# Patient Record
Sex: MALE | Race: OTHER | HISPANIC OR LATINO | Employment: OTHER | URBAN - METROPOLITAN AREA
[De-identification: names, ages, dates, MRNs, and addresses within clinical notes are randomized per-mention and may not be internally consistent; named-entity substitution may affect disease eponyms.]

---

## 2017-11-13 ENCOUNTER — LAB (OUTPATIENT)
Dept: LAB | Facility: HOSPITAL | Age: 67
End: 2017-11-13
Attending: OTOLARYNGOLOGY
Payer: COMMERCIAL

## 2017-11-13 ENCOUNTER — TRANSCRIBE ORDERS (OUTPATIENT)
Dept: LAB | Facility: HOSPITAL | Age: 67
End: 2017-11-13

## 2017-11-13 DIAGNOSIS — J32.9 CHRONIC SINUSITIS, UNSPECIFIED LOCATION: Primary | ICD-10-CM

## 2017-11-13 DIAGNOSIS — J32.9 CHRONIC SINUSITIS, UNSPECIFIED LOCATION: ICD-10-CM

## 2017-11-13 LAB
ANION GAP SERPL CALCULATED.3IONS-SCNC: 8 MMOL/L (ref 4–13)
ATRIAL RATE: 81 BPM
BASOPHILS # BLD AUTO: 0.05 THOUSANDS/ΜL (ref 0–0.1)
BASOPHILS NFR BLD AUTO: 1 % (ref 0–1)
BUN SERPL-MCNC: 14 MG/DL (ref 5–25)
CALCIUM SERPL-MCNC: 8.4 MG/DL (ref 8.3–10.1)
CHLORIDE SERPL-SCNC: 108 MMOL/L (ref 100–108)
CO2 SERPL-SCNC: 28 MMOL/L (ref 21–32)
CREAT SERPL-MCNC: 0.96 MG/DL (ref 0.6–1.3)
EOSINOPHIL # BLD AUTO: 0.29 THOUSAND/ΜL (ref 0–0.61)
EOSINOPHIL NFR BLD AUTO: 4 % (ref 0–6)
ERYTHROCYTE [DISTWIDTH] IN BLOOD BY AUTOMATED COUNT: 15.5 % (ref 11.6–15.1)
GFR SERPL CREATININE-BSD FRML MDRD: 80 ML/MIN/1.73SQ M
GLUCOSE P FAST SERPL-MCNC: 93 MG/DL (ref 65–99)
HCT VFR BLD AUTO: 44 % (ref 36.5–49.3)
HGB BLD-MCNC: 14.4 G/DL (ref 12–17)
LYMPHOCYTES # BLD AUTO: 2.02 THOUSANDS/ΜL (ref 0.6–4.47)
LYMPHOCYTES NFR BLD AUTO: 26 % (ref 14–44)
MCH RBC QN AUTO: 29.2 PG (ref 26.8–34.3)
MCHC RBC AUTO-ENTMCNC: 32.7 G/DL (ref 31.4–37.4)
MCV RBC AUTO: 89 FL (ref 82–98)
MONOCYTES # BLD AUTO: 0.97 THOUSAND/ΜL (ref 0.17–1.22)
MONOCYTES NFR BLD AUTO: 13 % (ref 4–12)
NEUTROPHILS # BLD AUTO: 4.4 THOUSANDS/ΜL (ref 1.85–7.62)
NEUTS SEG NFR BLD AUTO: 56 % (ref 43–75)
NRBC BLD AUTO-RTO: 0 /100 WBCS
P AXIS: 54 DEGREES
PLATELET # BLD AUTO: 181 THOUSANDS/UL (ref 149–390)
PMV BLD AUTO: 12.2 FL (ref 8.9–12.7)
POTASSIUM SERPL-SCNC: 4 MMOL/L (ref 3.5–5.3)
PR INTERVAL: 152 MS
QRS AXIS: -39 DEGREES
QRSD INTERVAL: 132 MS
QT INTERVAL: 440 MS
QTC INTERVAL: 511 MS
RBC # BLD AUTO: 4.93 MILLION/UL (ref 3.88–5.62)
SODIUM SERPL-SCNC: 144 MMOL/L (ref 136–145)
T WAVE AXIS: 26 DEGREES
VENTRICULAR RATE: 81 BPM
WBC # BLD AUTO: 7.78 THOUSAND/UL (ref 4.31–10.16)

## 2017-11-13 PROCEDURE — 36415 COLL VENOUS BLD VENIPUNCTURE: CPT

## 2017-11-13 PROCEDURE — 85025 COMPLETE CBC W/AUTO DIFF WBC: CPT

## 2017-11-13 PROCEDURE — 80048 BASIC METABOLIC PNL TOTAL CA: CPT

## 2017-11-13 PROCEDURE — 93005 ELECTROCARDIOGRAM TRACING: CPT

## 2017-11-28 RX ORDER — AMLODIPINE BESYLATE 5 MG/1
5 TABLET ORAL DAILY
COMMUNITY
End: 2020-09-18 | Stop reason: SDUPTHER

## 2017-11-28 RX ORDER — PRAVASTATIN SODIUM 10 MG
10 TABLET ORAL DAILY
COMMUNITY
End: 2020-09-18 | Stop reason: SDUPTHER

## 2017-11-28 RX ORDER — ALBUTEROL SULFATE 90 UG/1
2 AEROSOL, METERED RESPIRATORY (INHALATION) EVERY 6 HOURS PRN
COMMUNITY
End: 2021-01-06 | Stop reason: SDUPTHER

## 2017-11-28 RX ORDER — METRONIDAZOLE 10 MG/G
GEL TOPICAL DAILY
Status: ON HOLD | COMMUNITY
End: 2017-12-11

## 2017-11-28 RX ORDER — LISINOPRIL 40 MG/1
40 TABLET ORAL DAILY
COMMUNITY
End: 2020-11-18 | Stop reason: SDUPTHER

## 2017-12-06 NOTE — PRE-PROCEDURE INSTRUCTIONS
Pre-Surgery Instructions:   Medication Instructions    albuterol (PROVENTIL HFA,VENTOLIN HFA) 90 mcg/act inhaler Instructed patient per Anesthesia Guidelines   amLODIPine (NORVASC) 5 mg tablet Instructed patient per Anesthesia Guidelines   Cetirizine HCl 10 MG CAPS Instructed patient per Anesthesia Guidelines   fluticasone-salmeterol (ADVAIR) 250-50 mcg/dose inhaler Instructed patient per Anesthesia Guidelines   lisinopril (ZESTRIL) 40 mg tablet Instructed patient per Anesthesia Guidelines   metroNIDAZOLE (METROGEL) 1 % gel Instructed patient per Anesthesia Guidelines   pravastatin (PRAVACHOL) 10 mg tablet Instructed patient per Anesthesia Guidelines        PRE OP INSTRUCTIONS REVIEWED WITH [facility] HOSPITAL INSTRUCTIONS REVIEWED AND SHE VERBALIZES UNDERSTANDING OF INSTRUCTIONS GIVEN

## 2017-12-11 ENCOUNTER — ANESTHESIA (OUTPATIENT)
Dept: PERIOP | Facility: HOSPITAL | Age: 67
End: 2017-12-11
Payer: COMMERCIAL

## 2017-12-11 ENCOUNTER — ANESTHESIA EVENT (OUTPATIENT)
Dept: PERIOP | Facility: HOSPITAL | Age: 67
End: 2017-12-11
Payer: COMMERCIAL

## 2017-12-11 ENCOUNTER — HOSPITAL ENCOUNTER (OUTPATIENT)
Facility: HOSPITAL | Age: 67
Setting detail: OUTPATIENT SURGERY
Discharge: HOME | End: 2017-12-11
Attending: OTOLARYNGOLOGY | Admitting: OTOLARYNGOLOGY
Payer: COMMERCIAL

## 2017-12-11 VITALS
TEMPERATURE: 100.1 F | HEART RATE: 86 BPM | SYSTOLIC BLOOD PRESSURE: 139 MMHG | BODY MASS INDEX: 30.16 KG/M2 | RESPIRATION RATE: 18 BRPM | OXYGEN SATURATION: 95 % | DIASTOLIC BLOOD PRESSURE: 78 MMHG | HEIGHT: 65 IN | WEIGHT: 181 LBS

## 2017-12-11 DIAGNOSIS — L71.1 RHINOPHYMA: ICD-10-CM

## 2017-12-11 DIAGNOSIS — J32.9 CHRONIC SINUSITIS: ICD-10-CM

## 2017-12-11 PROCEDURE — 87205 SMEAR GRAM STAIN: CPT | Performed by: OTOLARYNGOLOGY

## 2017-12-11 PROCEDURE — 88312 SPECIAL STAINS GROUP 1: CPT | Performed by: OTOLARYNGOLOGY

## 2017-12-11 PROCEDURE — 87147 CULTURE TYPE IMMUNOLOGIC: CPT | Performed by: OTOLARYNGOLOGY

## 2017-12-11 PROCEDURE — 88311 DECALCIFY TISSUE: CPT | Performed by: OTOLARYNGOLOGY

## 2017-12-11 PROCEDURE — 88305 TISSUE EXAM BY PATHOLOGIST: CPT | Performed by: OTOLARYNGOLOGY

## 2017-12-11 PROCEDURE — C2625 STENT, NON-COR, TEM W/DEL SY: HCPCS | Performed by: OTOLARYNGOLOGY

## 2017-12-11 PROCEDURE — 87075 CULTR BACTERIA EXCEPT BLOOD: CPT | Performed by: OTOLARYNGOLOGY

## 2017-12-11 PROCEDURE — 87070 CULTURE OTHR SPECIMN AEROBIC: CPT | Performed by: OTOLARYNGOLOGY

## 2017-12-11 PROCEDURE — 87176 TISSUE HOMOGENIZATION CULTR: CPT | Performed by: OTOLARYNGOLOGY

## 2017-12-11 PROCEDURE — 88304 TISSUE EXAM BY PATHOLOGIST: CPT | Performed by: OTOLARYNGOLOGY

## 2017-12-11 DEVICE — PROPEL SINUS IMPLANT
Type: IMPLANTABLE DEVICE | Site: NOSE | Status: FUNCTIONAL
Brand: PROPEL

## 2017-12-11 RX ORDER — ONDANSETRON 2 MG/ML
4 INJECTION INTRAMUSCULAR; INTRAVENOUS ONCE AS NEEDED
Status: DISCONTINUED | OUTPATIENT
Start: 2017-12-11 | End: 2017-12-11 | Stop reason: HOSPADM

## 2017-12-11 RX ORDER — LIDOCAINE HYDROCHLORIDE 10 MG/ML
INJECTION, SOLUTION INFILTRATION; PERINEURAL AS NEEDED
Status: DISCONTINUED | OUTPATIENT
Start: 2017-12-11 | End: 2017-12-11 | Stop reason: SURG

## 2017-12-11 RX ORDER — FENTANYL CITRATE/PF 50 MCG/ML
25 SYRINGE (ML) INJECTION
Status: DISCONTINUED | OUTPATIENT
Start: 2017-12-11 | End: 2017-12-11 | Stop reason: HOSPADM

## 2017-12-11 RX ORDER — GINSENG 100 MG
CAPSULE ORAL AS NEEDED
Status: DISCONTINUED | OUTPATIENT
Start: 2017-12-11 | End: 2017-12-11 | Stop reason: HOSPADM

## 2017-12-11 RX ORDER — SODIUM CHLORIDE/ALOE VERA
GEL (GRAM) NASAL AS NEEDED
Status: DISCONTINUED | OUTPATIENT
Start: 2017-12-11 | End: 2017-12-11 | Stop reason: HOSPADM

## 2017-12-11 RX ORDER — METOPROLOL TARTRATE 5 MG/5ML
INJECTION INTRAVENOUS AS NEEDED
Status: DISCONTINUED | OUTPATIENT
Start: 2017-12-11 | End: 2017-12-11 | Stop reason: SURG

## 2017-12-11 RX ORDER — GLYCOPYRROLATE 0.2 MG/ML
INJECTION INTRAMUSCULAR; INTRAVENOUS AS NEEDED
Status: DISCONTINUED | OUTPATIENT
Start: 2017-12-11 | End: 2017-12-11 | Stop reason: SURG

## 2017-12-11 RX ORDER — SODIUM CHLORIDE, SODIUM LACTATE, POTASSIUM CHLORIDE, CALCIUM CHLORIDE 600; 310; 30; 20 MG/100ML; MG/100ML; MG/100ML; MG/100ML
125 INJECTION, SOLUTION INTRAVENOUS CONTINUOUS
Status: DISCONTINUED | OUTPATIENT
Start: 2017-12-11 | End: 2017-12-11 | Stop reason: HOSPADM

## 2017-12-11 RX ORDER — FENTANYL CITRATE 50 UG/ML
INJECTION, SOLUTION INTRAMUSCULAR; INTRAVENOUS AS NEEDED
Status: DISCONTINUED | OUTPATIENT
Start: 2017-12-11 | End: 2017-12-11 | Stop reason: SURG

## 2017-12-11 RX ORDER — EPHEDRINE SULFATE 50 MG/ML
INJECTION, SOLUTION INTRAVENOUS AS NEEDED
Status: DISCONTINUED | OUTPATIENT
Start: 2017-12-11 | End: 2017-12-11 | Stop reason: SURG

## 2017-12-11 RX ORDER — LIDOCAINE HYDROCHLORIDE AND EPINEPHRINE 10; 10 MG/ML; UG/ML
INJECTION, SOLUTION INFILTRATION; PERINEURAL AS NEEDED
Status: DISCONTINUED | OUTPATIENT
Start: 2017-12-11 | End: 2017-12-11 | Stop reason: HOSPADM

## 2017-12-11 RX ORDER — CEPHALEXIN 500 MG/1
500 CAPSULE ORAL 4 TIMES DAILY
Qty: 40 CAPSULE | Refills: 0 | Status: SHIPPED | OUTPATIENT
Start: 2017-12-11 | End: 2017-12-21

## 2017-12-11 RX ORDER — ONDANSETRON 2 MG/ML
INJECTION INTRAMUSCULAR; INTRAVENOUS AS NEEDED
Status: DISCONTINUED | OUTPATIENT
Start: 2017-12-11 | End: 2017-12-11 | Stop reason: SURG

## 2017-12-11 RX ORDER — SODIUM CHLORIDE 9 MG/ML
75 INJECTION, SOLUTION INTRAVENOUS CONTINUOUS
Status: DISCONTINUED | OUTPATIENT
Start: 2017-12-11 | End: 2017-12-11 | Stop reason: HOSPADM

## 2017-12-11 RX ORDER — OXYCODONE HYDROCHLORIDE AND ACETAMINOPHEN 5; 325 MG/1; MG/1
1 TABLET ORAL EVERY 4 HOURS PRN
Qty: 28 TABLET | Refills: 0 | Status: SHIPPED | OUTPATIENT
Start: 2017-12-11 | End: 2017-12-21

## 2017-12-11 RX ORDER — EPINEPHRINE 1 MG/ML
INJECTION, SOLUTION, CONCENTRATE INTRAVENOUS AS NEEDED
Status: DISCONTINUED | OUTPATIENT
Start: 2017-12-11 | End: 2017-12-11 | Stop reason: HOSPADM

## 2017-12-11 RX ORDER — ONDANSETRON 2 MG/ML
4 INJECTION INTRAMUSCULAR; INTRAVENOUS EVERY 6 HOURS PRN
Status: CANCELLED | OUTPATIENT
Start: 2017-12-11

## 2017-12-11 RX ORDER — SODIUM CHLORIDE 9 MG/ML
INJECTION, SOLUTION INTRAVENOUS CONTINUOUS PRN
Status: DISCONTINUED | OUTPATIENT
Start: 2017-12-11 | End: 2017-12-11 | Stop reason: SURG

## 2017-12-11 RX ORDER — ROCURONIUM BROMIDE 10 MG/ML
INJECTION, SOLUTION INTRAVENOUS AS NEEDED
Status: DISCONTINUED | OUTPATIENT
Start: 2017-12-11 | End: 2017-12-11 | Stop reason: SURG

## 2017-12-11 RX ORDER — HYDRALAZINE HYDROCHLORIDE 20 MG/ML
INJECTION INTRAMUSCULAR; INTRAVENOUS AS NEEDED
Status: DISCONTINUED | OUTPATIENT
Start: 2017-12-11 | End: 2017-12-11 | Stop reason: SURG

## 2017-12-11 RX ORDER — PROPOFOL 10 MG/ML
INJECTION, EMULSION INTRAVENOUS AS NEEDED
Status: DISCONTINUED | OUTPATIENT
Start: 2017-12-11 | End: 2017-12-11 | Stop reason: SURG

## 2017-12-11 RX ORDER — MIDAZOLAM HYDROCHLORIDE 1 MG/ML
INJECTION INTRAMUSCULAR; INTRAVENOUS AS NEEDED
Status: DISCONTINUED | OUTPATIENT
Start: 2017-12-11 | End: 2017-12-11 | Stop reason: SURG

## 2017-12-11 RX ORDER — ESMOLOL HYDROCHLORIDE 10 MG/ML
INJECTION INTRAVENOUS AS NEEDED
Status: DISCONTINUED | OUTPATIENT
Start: 2017-12-11 | End: 2017-12-11 | Stop reason: SURG

## 2017-12-11 RX ORDER — HYDROCODONE BITARTRATE AND ACETAMINOPHEN 5; 325 MG/1; MG/1
2 TABLET ORAL EVERY 6 HOURS PRN
Status: DISCONTINUED | OUTPATIENT
Start: 2017-12-11 | End: 2017-12-11 | Stop reason: HOSPADM

## 2017-12-11 RX ORDER — OXYMETAZOLINE HYDROCHLORIDE 0.05 G/100ML
SPRAY NASAL AS NEEDED
Status: DISCONTINUED | OUTPATIENT
Start: 2017-12-11 | End: 2017-12-11 | Stop reason: HOSPADM

## 2017-12-11 RX ADMIN — FENTANYL CITRATE 50 MCG: 50 INJECTION, SOLUTION INTRAMUSCULAR; INTRAVENOUS at 15:09

## 2017-12-11 RX ADMIN — FENTANYL CITRATE 50 MCG: 50 INJECTION, SOLUTION INTRAMUSCULAR; INTRAVENOUS at 18:00

## 2017-12-11 RX ADMIN — SODIUM CHLORIDE: 0.9 INJECTION, SOLUTION INTRAVENOUS at 16:00

## 2017-12-11 RX ADMIN — CEFAZOLIN SODIUM 2000 MG: 2 SOLUTION INTRAVENOUS at 15:15

## 2017-12-11 RX ADMIN — SODIUM CHLORIDE, SODIUM LACTATE, POTASSIUM CHLORIDE, AND CALCIUM CHLORIDE 125 ML/HR: .6; .31; .03; .02 INJECTION, SOLUTION INTRAVENOUS at 12:12

## 2017-12-11 RX ADMIN — MIDAZOLAM HYDROCHLORIDE 2 MG: 1 INJECTION, SOLUTION INTRAMUSCULAR; INTRAVENOUS at 15:03

## 2017-12-11 RX ADMIN — SODIUM CHLORIDE: 0.9 INJECTION, SOLUTION INTRAVENOUS at 18:13

## 2017-12-11 RX ADMIN — NEOSTIGMINE METHYLSULFATE 2 MG: 1 INJECTION, SOLUTION INTRAMUSCULAR; INTRAVENOUS; SUBCUTANEOUS at 17:36

## 2017-12-11 RX ADMIN — PROPOFOL 150 MG: 10 INJECTION, EMULSION INTRAVENOUS at 15:15

## 2017-12-11 RX ADMIN — EPHEDRINE SULFATE 10 MG: 50 INJECTION, SOLUTION INTRAMUSCULAR; INTRAVENOUS; SUBCUTANEOUS at 17:06

## 2017-12-11 RX ADMIN — FENTANYL CITRATE 50 MCG: 50 INJECTION, SOLUTION INTRAMUSCULAR; INTRAVENOUS at 15:17

## 2017-12-11 RX ADMIN — LIDOCAINE HYDROCHLORIDE 40 MG: 10 INJECTION, SOLUTION INFILTRATION; PERINEURAL at 15:15

## 2017-12-11 RX ADMIN — ROCURONIUM BROMIDE 50 MG: 10 INJECTION INTRAVENOUS at 15:16

## 2017-12-11 RX ADMIN — METOPROLOL TARTRATE 2 MG: 1 INJECTION, SOLUTION INTRAVENOUS at 17:52

## 2017-12-11 RX ADMIN — SODIUM CHLORIDE, SODIUM LACTATE, POTASSIUM CHLORIDE, AND CALCIUM CHLORIDE: .6; .31; .03; .02 INJECTION, SOLUTION INTRAVENOUS at 15:10

## 2017-12-11 RX ADMIN — ESMOLOL HYDROCHLORIDE 40 MG: 100 INJECTION, SOLUTION INTRAVENOUS at 17:42

## 2017-12-11 RX ADMIN — HYDRALAZINE HYDROCHLORIDE 5 MG: 20 INJECTION INTRAMUSCULAR; INTRAVENOUS at 17:57

## 2017-12-11 RX ADMIN — METOPROLOL TARTRATE 2 MG: 1 INJECTION, SOLUTION INTRAVENOUS at 18:10

## 2017-12-11 RX ADMIN — ONDANSETRON 4 MG: 2 INJECTION INTRAMUSCULAR; INTRAVENOUS at 17:26

## 2017-12-11 RX ADMIN — GLYCOPYRROLATE 0.4 MG: 0.2 INJECTION, SOLUTION INTRAMUSCULAR; INTRAVENOUS at 17:36

## 2017-12-11 RX ADMIN — DEXAMETHASONE SODIUM PHOSPHATE 10 MG: 10 INJECTION INTRAMUSCULAR; INTRAVENOUS at 17:26

## 2017-12-11 NOTE — ANESTHESIA PREPROCEDURE EVALUATION
Review of Systems/Medical History  Patient summary reviewed  Chart reviewed      Cardiovascular  Negative cardio ROS Hyperlipidemia, Hypertension controlled,    Pulmonary  Negative pulmonary ROS Asthma: well controlled/ stable , ,        GI/Hepatic  Negative GI/hepatic ROS          Negative  ROS        Endo/Other  Negative endo/other ROS      GYN  Negative gynecology ROS          Hematology  Negative hematology ROS      Musculoskeletal  Negative musculoskeletal ROS        Neurology  Negative neurology ROS      Psychology   Negative psychology ROS            Physical Exam    Airway    Mallampati score: III  TM Distance: >3 FB  Neck ROM: full     Dental   No notable dental hx     Cardiovascular  Comment: Negative ROS, Rhythm: regular, Rate: normal, Cardiovascular exam normal    Pulmonary  Pulmonary exam normal Breath sounds clear to auscultation,     Other Findings        Anesthesia Plan  ASA Score- 2       Anesthesia Type- general with ASA Monitors  Additional Monitors:   Airway Plan: ETT  Induction- intravenous  Informed Consent- Anesthetic plan and risks discussed with patient

## 2017-12-11 NOTE — ANESTHESIA POSTPROCEDURE EVALUATION
Post-Op Assessment Note      CV Status:  Stable    Mental Status:  Alert and awake    Hydration Status:  Euvolemic and stable    PONV Controlled:  None    Airway Patency:  Patent    Post Op Vitals Reviewed: Yes          Staff: CRNA           /97 (12/11/17 1813)    Temp 97 8 °F (36 6 °C) (12/11/17 1813)    Pulse 86 (12/11/17 1813)   Resp 19 (12/11/17 1813)    SpO2 95 % (12/11/17 1813)

## 2017-12-12 NOTE — DISCHARGE INSTRUCTIONS
After Septoplasty   AMBULATORY CARE:   Seek care immediately if:   · You have trouble breathing  · Clear fluid comes out of your nose when you bend your head forward  · Your heart is beating fast or has an irregular rhythm  · Your nose or the roof of your mouth is pale or starting to turn black  · You have severe pain  · You have red streaks on the skin around your nose  Contact your healthcare provider if:   · Your symptoms do not improve within 1 week  · Your nose bleeds more than you were told to expect  · You have a fever  · Your nose is red, swollen, and draining pus  · Your upper teeth, gum, or nose is numb  · Your sense of smell or taste is different than before surgery  · You have a change in your vision  · You have questions or concerns about your condition or care  Medicines:   · Medicines  may be given to help decrease pain and prevent infection  You may also need nose sprays to keep your nose moist and decrease swelling and congestion  · Take your medicine as directed  Contact your healthcare provider if you think your medicine is not helping or if you have side effects  Tell him or her if you are allergic to any medicine  Keep a list of the medicines, vitamins, and herbs you take  Include the amounts, and when and why you take them  Bring the list or the pill bottles to follow-up visits  Carry your medicine list with you in case of an emergency  Do not blow your nose: The increase in pressure can cause bruising, swelling, and bleeding  Try not to sneeze  If you have to sneeze, keep your mouth open to decrease pressure in your nose  Apply ice:  Apply ice on your nose for 15 to 20 minutes every hour for the first day  Use an ice pack, or put crushed ice in a plastic bag  Cover it with a towel  Ice helps prevent tissue damage and decreases swelling and pain     Elevate your head and upper back:  Keep your head and upper back elevated when you rest, such as in a recliner  Place extra pillows under your head and neck when you sleep in bed  Elevation will help decrease swelling  Self-care:   · Use a cool mist humidifier  A cool mist humidifier will increase air moisture in your home  This will help keep your nose and throat moist and prevent irritation  · Limit activity for 3 days or as directed  Do not lift objects over 20 pounds  Ask when you can return to your usual daily activities  · Care for your wound as directed  You may be able to use saltwater or hydrogen peroxide to remove crusts  If you have a splint, do not get it wet or try to remove it  · Do not smoke for at least 2 days after your surgery  Smoke can irritate your nose and delay healing  If you smoke, it is never too late to quit  Ask your healthcare provider for information if you need help quitting  Follow up with your healthcare provider as directed: You may need to return to have your gauze or splint removed  Write down your questions so you remember to ask them during your visits  © 2017 [facility] Information is for End User's use only and may not be sold, redistributed or otherwise used for commercial purposes  All illustrations and images included in CareNotes® are the copyrighted property of A D A M , Inc  or [employer]  The above information is an  only  It is not intended as medical advice for individual conditions or treatments  Talk to your doctor, nurse or pharmacist before following any medical regimen to see if it is safe and effective for you  Functional Endoscopic Sinus Surgery for Rhinosinusitis   WHAT YOU NEED TO KNOW:   Functional endoscopic sinus surgery (FESS) removes tissue that blocks your sinus openings  DISCHARGE INSTRUCTIONS:   Medicines:   · Medicines  can help decrease pain or prevent bacterial infections  Ask your healthcare provider how to take prescription pain medicine safely      · Take your medicine as directed  Contact your healthcare provider if you think your medicine is not helping or if you have side effects  Tell him or her if you are allergic to any medicine  Keep a list of the medicines, vitamins, and herbs you take  Include the amounts, and when and why you take them  Bring the list or the pill bottles to follow-up visits  Carry your medicine list with you in case of an emergency  Follow up with your healthcare provider as directed:  Write down your questions so you remember to ask them during your visits  Home care:   · Avoid blowing your nose too hard  Salazar Barrs your nose gently or use a soft rubber suction bulb  · Drink plenty of liquids  Ask your healthcare provider how much liquid to drink each day and which liquids are best for you  Limit the amount of caffeine you drink  · Rinse your nose with salt water  This may help loosen your thick mucous so that it comes out more easily when you blow your nose  Ask your healthcare provider for more information on how to prepare and do nasal washings  · Use a cool-mist vaporizer or humidifier  to add moisture to the air  This helps thin your nasal discharge and prevent colds  Wash the humidifier each day with soap and warm water to keep it free of germs  · Wash your hands frequently  Wash your hands after you come into contact with a person who has a cold  Germ-killing hand lotion or gel may be used to clean your hands when there is no water available  Nasal packing:  Ask your healthcare provider how long the nasal packing will stay inside your nose  If it needs to be removed and replaced at home, ask your healthcare provider how to properly do this  Contact your healthcare provider if:   · You have a fever  · You have chills, a cough, or feel weak and achy  · You have bruises or swelling around your nose or eyes  · You have nausea or vomiting  · Blood soaks through your nasal packing      · Your skin is itchy, swollen, or has a rash  · You have questions or concerns about your condition or care  Seek care immediately or call 911 if:   · You have a stiff neck or eye pain, especially when you look directly at light  · You have a severe headache that does not go away even after you take pain medicine  · You have clear fluid coming from your nose  · You have pus or a foul-smelling odor coming from your nose  · You have trouble breathing, seeing, talking, or thinking clearly  · Your face is getting numb  · Your symptoms come back or become worse  © 2017 [address] [facility] Information is for End User's use only and may not be sold, redistributed or otherwise used for commercial purposes  All illustrations and images included in CareNotes® are the copyrighted property of A D A M , Inc  or Edwin Armendar[employer]  The above information is an  only  It is not intended as medical advice for individual conditions or treatments  Talk to your doctor, nurse or pharmacist before following any medical regimen to see if it is safe and effective for you

## 2017-12-12 NOTE — PERIOPERATIVE NURSING NOTE
Pt  Has scant amount of drainage to right nares  Dr Jennyfer Sanchez md, at bedside and aware  No new orders  ok to be d/c'd to asc  Will continue to monitor

## 2017-12-14 LAB
BACTERIA SPEC ANAEROBE CULT: NORMAL
BACTERIA TISS AEROBE CULT: NORMAL
GRAM STN SPEC: NORMAL
GRAM STN SPEC: NORMAL

## 2017-12-27 ENCOUNTER — TRANSCRIBE ORDERS (OUTPATIENT)
Dept: LAB | Facility: HOSPITAL | Age: 67
End: 2017-12-27

## 2017-12-27 ENCOUNTER — APPOINTMENT (OUTPATIENT)
Dept: LAB | Facility: HOSPITAL | Age: 67
End: 2017-12-27
Attending: INTERNAL MEDICINE
Payer: COMMERCIAL

## 2017-12-27 DIAGNOSIS — R94.5 NONSPECIFIC ABNORMAL RESULTS OF LIVER FUNCTION STUDY: Primary | ICD-10-CM

## 2017-12-27 DIAGNOSIS — R94.5 NONSPECIFIC ABNORMAL RESULTS OF LIVER FUNCTION STUDY: ICD-10-CM

## 2017-12-27 LAB
ALBUMIN SERPL BCP-MCNC: 3 G/DL (ref 3.5–5)
ALP SERPL-CCNC: 111 U/L (ref 46–116)
ALT SERPL W P-5'-P-CCNC: 29 U/L (ref 12–78)
ANION GAP SERPL CALCULATED.3IONS-SCNC: 7 MMOL/L (ref 4–13)
AST SERPL W P-5'-P-CCNC: 20 U/L (ref 5–45)
BILIRUB SERPL-MCNC: 0.88 MG/DL (ref 0.2–1)
BUN SERPL-MCNC: 20 MG/DL (ref 5–25)
CALCIUM SERPL-MCNC: 8.3 MG/DL (ref 8.3–10.1)
CHLORIDE SERPL-SCNC: 106 MMOL/L (ref 100–108)
CO2 SERPL-SCNC: 26 MMOL/L (ref 21–32)
CREAT SERPL-MCNC: 0.9 MG/DL (ref 0.6–1.3)
GFR SERPL CREATININE-BSD FRML MDRD: 87 ML/MIN/1.73SQ M
GGT SERPL-CCNC: 60 U/L (ref 5–85)
GLUCOSE P FAST SERPL-MCNC: 95 MG/DL (ref 65–99)
HBV CORE AB SER QL: NORMAL
HBV CORE IGM SER QL: NORMAL
HBV SURFACE AG SER QL: NORMAL
HCV AB SER QL: NORMAL
POTASSIUM SERPL-SCNC: 3.9 MMOL/L (ref 3.5–5.3)
PROT SERPL-MCNC: 7.1 G/DL (ref 6.4–8.2)
SODIUM SERPL-SCNC: 139 MMOL/L (ref 136–145)

## 2017-12-27 PROCEDURE — 36415 COLL VENOUS BLD VENIPUNCTURE: CPT

## 2017-12-27 PROCEDURE — 86705 HEP B CORE ANTIBODY IGM: CPT

## 2017-12-27 PROCEDURE — 86704 HEP B CORE ANTIBODY TOTAL: CPT

## 2017-12-27 PROCEDURE — 87340 HEPATITIS B SURFACE AG IA: CPT

## 2017-12-27 PROCEDURE — 86803 HEPATITIS C AB TEST: CPT

## 2017-12-27 PROCEDURE — 80053 COMPREHEN METABOLIC PANEL: CPT

## 2017-12-27 PROCEDURE — 82977 ASSAY OF GGT: CPT

## 2017-12-27 NOTE — OP NOTE
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]    Procedure(s) (LRB):  SEPTOPLASTY; TURBINOPLASTY (N/A)  ENDOSCOPIC SINUS SURGERY; EXCISION RHINOPHYMA (N/A)    Specimen(s):  ID Type Source Tests Collected by Time Destination   1 : Left ethmoid polyp  Tissue Nasal/Sinus Polyps TISSUE Jaleesa Amin MD 2017 1549    2 : Right ethmoid polyp  Tissue Nasal/Sinus Polyps TISSUE EXAM Екатерина Ibarra MD 2017 1623    3 : Right maxillary sinus content  Tissue Maxillary Sinus TISSUE EXAM Екатерина Ibarra MD 2017 1634    4 : Rhinophyma  Tissue Soft Tissue, Other TISSUE EXAM Екатерина Ibarra MD 2017 1713    A : RIGHT ETHMOID  Tissue Nasal/Sinus Polyps ANAEROBIC CULTURE AND GRAM STAIN, CULTURE, TISSUE  [address] Livier Walker MD 2017 1625        Estimated Blood Loss:   300 mL    Drains:       Anesthesia Type:   General    Operative Indications:  Chronic sinusitis [J32 9]  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]   Deviated nasal septum  Hypertrophy of turbinates    Operative Findings:  Massive rhinophyma with obstruction of nasal vestibule, right septal deviation at the chondral ethmoidal junction, nasal polyposis, polypoid tissue on the ethmoid cells bilaterally with severe on the posterior ethmoid on the right side  Polypoid tissue on the left maxillary sinus, polyps and cysts on the right maxillary sinus  Hypertrophy of turbinates  Complications:     Postop complications: None    Procedure and Technique:    Patient was met in holding area, positively identified and risks, benefits and alternatives discussed, Patient confirmed informed consent  Oyxmetazoline nasal spray was applied to bilateral nasal cavities   The patient was transferred onto the operating table in the supine position  Appropriate monitoring devices were put in place, general anesthesia was administered by anesthesiologist and patient intubated without complications, tubetaped in midline  The patient was prepped and draped in the usual clean fashion  Before proceeding further, a time-out was taken during which the patients identification and planned surgical procedure were confirmed  taped in midline  The patient was prepped and draped in the usual clean fashion  Before proceeding further, a time-out was taken during which the patients identification and planned surgical procedure were confirmed  Examination with a speculum confirmed significant obstruction bilaterally, topical oxymetazoline applied with cotton pledgets placed  Once the endoscopic requirement was set up, the pledgets were removed and examination was completed with the 0° endoscope  The right nasal cavity was noticed to be very narrow for endoscopic surgery  Attention was directed to the left nasal cavity  Left middle meatus polyp, middle turbinate and lateral wall were injected with 1% lidocaine with epinephrine 1:100 000 under endoscopic visualization with a 0 degree endoscope  After waiting for vasoconstrictor and anesthesia to take effect, the polyp on the middle meatus was excised with microdebrider, the lateral portion of tosha xavier was also excised and middle turbinate then contoured to a normal size and appearance  The dissection continued then through the ethmoid cells removing bony partitions and polypoid tissue, across the basal lamella to the posterior ethmoid  Once the posterior wall of the ethmoid was reached the roof was identified and the dissection continued from posterior to anterior along the roof  This dissection was extended to the frontal recess, frontal sinus was then identified and the frontal sinus outflow tract  cleared removing polypoid tissue   Attention was brought to the lateral wall, using the 30° endoscope, the ball tip curved seeker was used to locate the ostium that was then enlarged with microdebrider, removing the inferior portion of the uncinate  Examination of the maxillary sinus revealed polypoid mucosa that was then removed with a curved tip microdebrider  Pledgets with Afrin were placed in the middle meatus and maxillary sinus opening  Attention was directed to the septum,  1% lidocaine with 1/100,000 epinephrine was injected to the septum on caudal edge  After allowing adequate time for anesthetic and vasoconstrictive effect, a septoplasty hemitransfixion  incision along the caudal margin of the septum on the left side was performed with the 15 blade  Mucoperichondrial and mucoperiosteal flaps were elevated with the iris scissors and freer elevator bilaterally  The deviated septal cartilage was exposed, basal deviated cartilage was excised and extended to include the chondroethmoidal cartilage inferiorly  The deviated portion of perpendicular plate was then excised with Connie Oar double action rongeour  Posterior angled septum cut with septoplasty scissors and easily removed with Tejas Neth forceps  The deviated nasal crest was medialized with a Jessa forceps, greenstick fracture  Attention placed on the right nasal cavity, middle turbinate and lateral wall were injected with 1% lidocaine with epinephrine 1:100 000 under endoscopic visualization with a 0 degree endoscope  After waiting for vasoconstrictor and anesthesia to take effect, the hypertrophic portion of the turbinate was excised with microdebrider, the lateral portion of the xaiver was also excised and middle turbinate then contoured to a normal size and appearance  The polyps on the middle meatus were excised with microdebrider, the lateral portion of tosha xavier was also excised and middle turbinate then contoured to a normal size and appearance   The dissection continued then through the ethmoid cells removing bony partitions and polypoid tissue, across the basal lamella to the posterior ethmoid  Once the posterior wall of the ethmoid was reached the roof was identified and the dissection continued from posterior to anterior along the roof  This dissection was extended to the frontal recess, frontal sinus was then identified and the frontal sinus outflow tract  cleared removing polypoid tissue  Attention was brought to the lateral wall, using the 30° endoscope, the ball tip curved seeker was used to locate the ostium that was then enlarged with microdebrider, removing the inferior portion of the uncinate  Examination of the maxillary sinus revealed polyps and a large cyst that was removed with a curved tip microdebrider  Pledgets with Afrin were placed in the middle meatus and maxillary sinus opening  Attention was then placed to turbinates, turbinoplasty performed with coblation and outfracturing the inferior turbinates  Bilateral nasal cavities were then re-examined, and the longest nasal speculum could be passed back along the septum on both sides without meeting any resistance  The pledgets were removed, and septoplasty incision was closed using  4-0 chromic stitches  Mucoperichondrial flaps were sutured with 4/0 plain gut with a small Luis needle in a mattress type suture  Septal silicone splints were then placed and sutured with 3/0 Prolene  The nasal dorsum and nasal tip with Rhinophyma were injected with 1% lidocaine with epinephrine 1 100,000   Once proper vasoconstriction was obtained, the Rhino female with excised carving with a 20 blade until a proper contour of the nasal tip and dorsum was obtained  Hemostasis was obtained with topical adrenaline, bipolar cautery to the most significant bleeders and subsequently applying Anayeli powder  The nasopharynx was then suctioned free of blood and secretions    Anesthesia was discontinued, drapes were removed, patient extubated, and taken to the recovery room in stable condition  All counts were correct at the end of the case, and no complications were encountered       I was present for the entire procedure    Patient Disposition:  PACU     SIGNATURE: Duncan Ingram MD  DATE: December 27, 2017  TIME: 4:44 PM

## 2020-07-24 LAB — HBA1C MFR BLD HPLC: 6.6 %

## 2020-09-15 ENCOUNTER — CLINICAL SUPPORT (OUTPATIENT)
Dept: INTERNAL MEDICINE CLINIC | Facility: CLINIC | Age: 70
End: 2020-09-15
Payer: COMMERCIAL

## 2020-09-15 VITALS
BODY MASS INDEX: 30.66 KG/M2 | WEIGHT: 184 LBS | DIASTOLIC BLOOD PRESSURE: 80 MMHG | SYSTOLIC BLOOD PRESSURE: 118 MMHG | TEMPERATURE: 97.3 F | HEIGHT: 65 IN | HEART RATE: 73 BPM

## 2020-09-15 DIAGNOSIS — Z23 NEED FOR INFLUENZA VACCINATION: Primary | ICD-10-CM

## 2020-09-15 PROCEDURE — 90653 IIV ADJUVANT VACCINE IM: CPT

## 2020-09-18 DIAGNOSIS — I10 HYPERTENSION, UNSPECIFIED TYPE: ICD-10-CM

## 2020-09-18 DIAGNOSIS — E78.5 HYPERLIPIDEMIA, UNSPECIFIED HYPERLIPIDEMIA TYPE: Primary | ICD-10-CM

## 2020-09-18 RX ORDER — PRAVASTATIN SODIUM 40 MG
40 TABLET ORAL DAILY
Qty: 90 TABLET | Refills: 0 | Status: SHIPPED | OUTPATIENT
Start: 2020-09-18 | End: 2020-12-30

## 2020-09-18 RX ORDER — AMLODIPINE BESYLATE 5 MG/1
5 TABLET ORAL DAILY
Qty: 90 TABLET | Refills: 0 | Status: SHIPPED | OUTPATIENT
Start: 2020-09-18 | End: 2020-12-30

## 2020-11-18 DIAGNOSIS — E11.9 TYPE 2 DIABETES MELLITUS WITHOUT COMPLICATION, WITH LONG-TERM CURRENT USE OF INSULIN (HCC): Primary | ICD-10-CM

## 2020-11-18 DIAGNOSIS — Z79.4 TYPE 2 DIABETES MELLITUS WITHOUT COMPLICATION, WITH LONG-TERM CURRENT USE OF INSULIN (HCC): Primary | ICD-10-CM

## 2020-11-18 DIAGNOSIS — I10 HYPERTENSION, UNSPECIFIED TYPE: ICD-10-CM

## 2020-11-18 DIAGNOSIS — E78.5 HYPERLIPIDEMIA, UNSPECIFIED HYPERLIPIDEMIA TYPE: ICD-10-CM

## 2020-11-18 PROCEDURE — 4010F ACE/ARB THERAPY RXD/TAKEN: CPT | Performed by: INTERNAL MEDICINE

## 2020-11-18 RX ORDER — LISINOPRIL 40 MG/1
40 TABLET ORAL DAILY
Qty: 90 TABLET | Refills: 0 | Status: SHIPPED | OUTPATIENT
Start: 2020-11-18 | End: 2021-02-19 | Stop reason: SDUPTHER

## 2020-11-24 ENCOUNTER — OFFICE VISIT (OUTPATIENT)
Dept: INTERNAL MEDICINE CLINIC | Facility: CLINIC | Age: 70
End: 2020-11-24
Payer: COMMERCIAL

## 2020-11-24 VITALS
OXYGEN SATURATION: 96 % | HEIGHT: 65 IN | SYSTOLIC BLOOD PRESSURE: 135 MMHG | TEMPERATURE: 97.2 F | HEART RATE: 79 BPM | WEIGHT: 187 LBS | BODY MASS INDEX: 31.16 KG/M2 | DIASTOLIC BLOOD PRESSURE: 80 MMHG

## 2020-11-24 DIAGNOSIS — J45.40 MODERATE PERSISTENT ASTHMA WITHOUT COMPLICATION: ICD-10-CM

## 2020-11-24 DIAGNOSIS — L30.9 DERMATITIS: ICD-10-CM

## 2020-11-24 DIAGNOSIS — E08.9 DIABETES MELLITUS DUE TO UNDERLYING CONDITION, CONTROLLED, WITHOUT COMPLICATION, WITHOUT LONG-TERM CURRENT USE OF INSULIN (HCC): ICD-10-CM

## 2020-11-24 DIAGNOSIS — I10 HYPERTENSION, UNSPECIFIED TYPE: Primary | ICD-10-CM

## 2020-11-24 DIAGNOSIS — E78.5 HYPERLIPIDEMIA, UNSPECIFIED HYPERLIPIDEMIA TYPE: ICD-10-CM

## 2020-11-24 DIAGNOSIS — K63.9 CECAL LESION: ICD-10-CM

## 2020-11-24 DIAGNOSIS — C67.9 MALIGNANT NEOPLASM OF URINARY BLADDER, UNSPECIFIED SITE (HCC): ICD-10-CM

## 2020-11-24 PROBLEM — D86.9 SARCOID: Status: ACTIVE | Noted: 2017-10-10

## 2020-11-24 PROCEDURE — 3008F BODY MASS INDEX DOCD: CPT | Performed by: INTERNAL MEDICINE

## 2020-11-24 PROCEDURE — 1160F RVW MEDS BY RX/DR IN RCRD: CPT | Performed by: INTERNAL MEDICINE

## 2020-11-24 PROCEDURE — 1101F PT FALLS ASSESS-DOCD LE1/YR: CPT | Performed by: INTERNAL MEDICINE

## 2020-11-24 PROCEDURE — 3288F FALL RISK ASSESSMENT DOCD: CPT | Performed by: INTERNAL MEDICINE

## 2020-11-24 PROCEDURE — 3079F DIAST BP 80-89 MM HG: CPT | Performed by: INTERNAL MEDICINE

## 2020-11-24 PROCEDURE — 99214 OFFICE O/P EST MOD 30 MIN: CPT | Performed by: INTERNAL MEDICINE

## 2020-11-24 PROCEDURE — 3075F SYST BP GE 130 - 139MM HG: CPT | Performed by: INTERNAL MEDICINE

## 2020-11-24 RX ORDER — PREDNISOLONE ACETATE 10 MG/ML
SUSPENSION/ DROPS OPHTHALMIC
COMMUNITY
Start: 2020-10-05 | End: 2021-05-07

## 2020-11-24 RX ORDER — METRONIDAZOLE 500 MG/1
TABLET ORAL
COMMUNITY
Start: 2020-11-24 | End: 2021-05-07

## 2020-11-24 RX ORDER — HYDROCORTISONE 25 MG/ML
LOTION TOPICAL DAILY
Qty: 200 ML | Refills: 2 | Status: SHIPPED | OUTPATIENT
Start: 2020-11-24 | End: 2021-05-07

## 2020-11-24 RX ORDER — NEOMYCIN SULFATE 500 MG/1
TABLET ORAL
COMMUNITY
Start: 2020-11-24 | End: 2021-05-07

## 2020-12-08 DIAGNOSIS — K02.9 DENTAL CARIES: ICD-10-CM

## 2020-12-30 DIAGNOSIS — E78.5 HYPERLIPIDEMIA, UNSPECIFIED HYPERLIPIDEMIA TYPE: ICD-10-CM

## 2020-12-30 DIAGNOSIS — J45.40 MODERATE PERSISTENT ASTHMA WITHOUT COMPLICATION: Primary | ICD-10-CM

## 2020-12-30 DIAGNOSIS — I10 HYPERTENSION, UNSPECIFIED TYPE: ICD-10-CM

## 2020-12-30 RX ORDER — PRAVASTATIN SODIUM 40 MG
TABLET ORAL
Qty: 90 TABLET | Refills: 0 | Status: SHIPPED | OUTPATIENT
Start: 2020-12-30 | End: 2021-01-04 | Stop reason: SDUPTHER

## 2020-12-30 RX ORDER — AMLODIPINE BESYLATE 5 MG/1
TABLET ORAL
Qty: 90 TABLET | Refills: 0 | Status: SHIPPED | OUTPATIENT
Start: 2020-12-30 | End: 2021-01-04 | Stop reason: SDUPTHER

## 2021-01-04 ENCOUNTER — CONSULT (OUTPATIENT)
Dept: INTERNAL MEDICINE CLINIC | Facility: CLINIC | Age: 71
End: 2021-01-04
Payer: COMMERCIAL

## 2021-01-04 VITALS
DIASTOLIC BLOOD PRESSURE: 85 MMHG | WEIGHT: 184 LBS | SYSTOLIC BLOOD PRESSURE: 140 MMHG | BODY MASS INDEX: 30.66 KG/M2 | OXYGEN SATURATION: 97 % | HEART RATE: 65 BPM | HEIGHT: 65 IN | TEMPERATURE: 98 F

## 2021-01-04 DIAGNOSIS — C67.9 MALIGNANT NEOPLASM OF URINARY BLADDER, UNSPECIFIED SITE (HCC): ICD-10-CM

## 2021-01-04 DIAGNOSIS — I10 HYPERTENSION, UNSPECIFIED TYPE: ICD-10-CM

## 2021-01-04 DIAGNOSIS — E08.9 DIABETES MELLITUS DUE TO UNDERLYING CONDITION, CONTROLLED, WITHOUT COMPLICATION, WITHOUT LONG-TERM CURRENT USE OF INSULIN (HCC): ICD-10-CM

## 2021-01-04 DIAGNOSIS — E78.5 HYPERLIPIDEMIA, UNSPECIFIED HYPERLIPIDEMIA TYPE: ICD-10-CM

## 2021-01-04 DIAGNOSIS — K63.9 CECAL LESION: ICD-10-CM

## 2021-01-04 DIAGNOSIS — Z01.818 PREOP EXAM FOR INTERNAL MEDICINE: Primary | ICD-10-CM

## 2021-01-04 DIAGNOSIS — J45.40 MODERATE PERSISTENT ASTHMA WITHOUT COMPLICATION: ICD-10-CM

## 2021-01-04 PROCEDURE — 93000 ELECTROCARDIOGRAM COMPLETE: CPT | Performed by: INTERNAL MEDICINE

## 2021-01-04 PROCEDURE — 99214 OFFICE O/P EST MOD 30 MIN: CPT | Performed by: INTERNAL MEDICINE

## 2021-01-04 PROCEDURE — 3077F SYST BP >= 140 MM HG: CPT | Performed by: INTERNAL MEDICINE

## 2021-01-04 PROCEDURE — 3008F BODY MASS INDEX DOCD: CPT | Performed by: INTERNAL MEDICINE

## 2021-01-04 PROCEDURE — 3079F DIAST BP 80-89 MM HG: CPT | Performed by: INTERNAL MEDICINE

## 2021-01-04 RX ORDER — AMLODIPINE BESYLATE 5 MG/1
5 TABLET ORAL DAILY
Qty: 90 TABLET | Refills: 1 | Status: SHIPPED | OUTPATIENT
Start: 2021-01-04 | End: 2021-04-15 | Stop reason: SDUPTHER

## 2021-01-04 RX ORDER — PRAVASTATIN SODIUM 40 MG
40 TABLET ORAL DAILY
Qty: 90 TABLET | Refills: 1 | Status: SHIPPED | OUTPATIENT
Start: 2021-01-04 | End: 2021-04-15 | Stop reason: SDUPTHER

## 2021-01-04 NOTE — PATIENT INSTRUCTIONS
Diabetes and Exercise   WHAT YOU NEED TO KNOW:   How will exercise help me manage diabetes? Physical activity, such as exercise, can help keep your blood sugar level steady or improve insulin resistance  Activity can help decrease your risk for heart disease, and help you lose weight  Exercise can also help lower your A1c  Your diabetes care team will help you create an exercise plan  The plan will be based on the type of diabetes you have and your starting fitness level  What are some tips to help me create and meet my exercise goals? · Set a goal for 150 minutes (2 5 hours) of moderate to vigorous aerobic activity each week  Aerobic activity helps your heart stay strong  Aerobic activity includes walking, bicycling, dancing, swimming, and raking leaves  Spread aerobic activity over 3 to 5 days  Do not take more than 2 days off in a row  It is best to do at least 10 minutes at a time and 30 minutes each day  You can work up to these goals  Remember that any activity is better than no activity  Over time, you can make exercise more intense or last longer  You can also add more days of exercise as your fitness level improves  Your diabetes care team can help you make a step-by-step plan to achieve your goals  · Set a strength training goal of 2 to 3 times a week  Take at least 1 day off in between strength training sessions  Strength training helps you keep the muscles you have and build new muscles  Strength training includes lifting weights, climbing stairs, yoga, and patric chi     · Older adults should include balance training 2 to 3 times each week  These include walking backwards, standing on one foot, and walking heel to toe in a straight line  What are some other healthy exercise tips? · Stretch before and after you exercise to prevent injury  · Drink water or liquids that do not contain sugar before, during, and after exercise   Ask your dietitian or healthcare provider which liquids you should drink when you exercise  · Do not sit for longer than 30 minutes at a time during your day  If you cannot walk around, at least stand up  This will help you stay active and keep your blood circulating  What do I need to know about exercise and my blood sugar levels? Check your blood sugar level before and after exercise, if you use insulin  Healthcare providers may tell you to change the amount of insulin you take or food you eat  · If your blood sugar level is high, check your blood or urine for ketones before you exercise  Do not exercise if your blood sugar level is high and you have ketones  · If your blood sugar level is less than 100 mg/dL, have a carbohydrate snack before you exercise  Examples are 4 to 6 crackers, ½ banana, 8 ounces (1 cup) of milk, or 4 ounces (½ cup) of juice  Call your local emergency number (911 in the [address] [facility]) if:   · You have chest pain or shortness of breath  When should I seek immediate care? · You have a low blood sugar level and it does not improve with treatment  Symptoms are trouble thinking, a pounding heartbeat, and sweating  · Your blood sugar level is above 240 mg/dL and does not come down within 15 minutes of treatment  · You have blurred or double vision  · Your breath has a fruity, sweet smell, or your breathing is shallow  When should I call my doctor or diabetes care team?   · You have ketones in your blood or urine  · You have a fever  · Your blood sugar levels are higher than your target goals  · You often have low blood sugar levels  · Your skin is red, dry, warm, or swollen  · You have a wound that does not heal     · You have trouble coping with diabetes, or you feel anxious or depressed  · You have questions or concerns about your condition or care  CARE AGREEMENT:   You have the right to help plan your care  Learn about your health condition and how it may be treated   Discuss treatment options with your healthcare providers to decide what care you want to receive  You always have the right to refuse treatment  The above information is an  only  It is not intended as medical advice for individual conditions or treatments  Talk to your doctor, nurse or pharmacist before following any medical regimen to see if it is safe and effective for you  © Copyright [address] Information is for End User's use only and may not be sold, redistributed or otherwise used for commercial purposes   All illustrations and images included in CareNotes® are the copyrighted property of [employer]  or [employer]

## 2021-01-04 NOTE — PROGRESS NOTES
Assessment/Plan: This is a 68-year-old gentleman who is scheduled for elective robotic cecectomy  According to the revised cardiac risk index his cardiac risk was at 0 4%  He is okay to proceed with surgery  1  Preop exam for internal medicine  -     POCT ECG    2  Cecal lesion    3  Hypertension, unspecified type  -     amLODIPine (NORVASC) 5 mg tablet; Take 1 tablet (5 mg total) by mouth daily    4  Hyperlipidemia, unspecified hyperlipidemia type  -     pravastatin (PRAVACHOL) 40 mg tablet; Take 1 tablet (40 mg total) by mouth daily    5  Moderate persistent asthma without complication  -     fluticasone-salmeterol (Wixela Inhub) 250-50 mcg/dose inhaler; Inhale 1 puff 2 (two) times a day Rinse mouth after use  6  Diabetes mellitus due to underlying condition, controlled, without complication, without long-term current use of insulin ([address] )    7  Malignant neoplasm of urinary bladder, unspecified site ([facility] )           There are no diagnoses linked to this encounter  Subjective:      Patient ID: Lauren Beal is a 65 y o  male  This is a 68-year-old gentleman scheduled for an elective robotic cecectomy  He denies any chest pain or shortness of breath and is independent in all his activities  He has a history of diabetes controlled with oral medications hypertension asthma and bladder cancer  Currently he is stable with all his medical conditions        The following portions of the patient's history were reviewed and updated as appropriate: He  has a past medical history of Asthma, Bladder cancer ([address] ), Cancer ([address] ), Diabetes mellitus due to underlying condition, controlled, without complication, without long-term current use of insulin ([address] ), HTN (hypertension), Hyperlipidemia, Hyperlipidemia, Hypertension, Lung disease, Non-seasonal allergic rhinitis, Obstructive sleep apnea (adult) (pediatric), Other asthma, Rhinophyma, and Sleep apnea with use of continuous positive airway pressure (CPAP)  He   Patient Active Problem List    Diagnosis Date Noted    Diabetes mellitus due to underlying condition, controlled, without complication, without long-term current use of insulin ([facility] )     Asthma     Bladder cancer ([facility] )     Sarcoid 10/10/2017     He  has a past surgical history that includes Appendectomy; Cystoscopy; pr repair of nasal septum (N/A, 12/11/2017); pr nasal/sinus ndsc w/total ethoidectomy (N/A, 12/11/2017); Bladder surgery; Prostate surgery; and Colonoscopy (02/10/2016)  His family history includes Coronary artery disease in his brother; Dementia in his sister; Diabetes in his father and mother; Heart disease in his father and mother; Hypertension in his family; Lung cancer in his brother  He  reports that he has never smoked  He has never used smokeless tobacco  He reports that he does not drink alcohol or use drugs  Current Outpatient Medications   Medication Sig Dispense Refill    albuterol (PROVENTIL HFA,VENTOLIN HFA) 90 mcg/act inhaler Inhale 2 puffs every 6 (six) hours as needed for wheezing      amLODIPine (NORVASC) 5 mg tablet Take 1 tablet (5 mg total) by mouth daily 90 tablet 1    Cetirizine HCl 10 MG CAPS Take by mouth      doxycycline (ADOXA) 100 MG tablet Take 1 tablet (100 mg total) by mouth daily 90 tablet 0    fluticasone (FLONASE) 50 mcg/act nasal spray 2 sprays into each nostril daily 3 Bottle 1    fluticasone-salmeterol (Wixela Inhub) 250-50 mcg/dose inhaler Inhale 1 puff 2 (two) times a day Rinse mouth after use   180 each 1    lisinopril (ZESTRIL) 40 mg tablet Take 1 tablet (40 mg total) by mouth daily 90 tablet 0    metFORMIN (GLUCOPHAGE) 500 mg tablet Take 1 tablet (500 mg total) by mouth daily 90 tablet 0    metroNIDAZOLE (FLAGYL) 500 mg tablet       metroNIDAZOLE (Flagyl) 500 mg tablet Take by mouth      neomycin (MYCIFRADIN) 500 mg tablet Take by mouth      pravastatin (PRAVACHOL) 40 mg tablet Take 1 tablet (40 mg total) by mouth daily 90 tablet 1  prednisoLONE acetate (PRED FORTE) 1 % ophthalmic suspension 1 DROP IN SURGICAL EYE FOUR TIMES A DAY FOR FOUR DAYS      terbinafine (LamISIL) 250 mg tablet       hydrocortisone 2 5 % lotion Apply topically daily (Patient not taking: Reported on 1/4/2021) 200 mL 2    sodium chloride (OCEAN) 0 65 % nasal spray 1 spray into each nostril every 2 (two) hours as needed for congestion for up to 30 days (Patient not taking: Reported on 1/4/2021) 15 mL 0     No current facility-administered medications for this visit        Current Outpatient Medications on File Prior to Visit   Medication Sig    albuterol (PROVENTIL HFA,VENTOLIN HFA) 90 mcg/act inhaler Inhale 2 puffs every 6 (six) hours as needed for wheezing    Cetirizine HCl 10 MG CAPS Take by mouth    doxycycline (ADOXA) 100 MG tablet Take 1 tablet (100 mg total) by mouth daily    fluticasone (FLONASE) 50 mcg/act nasal spray 2 sprays into each nostril daily    lisinopril (ZESTRIL) 40 mg tablet Take 1 tablet (40 mg total) by mouth daily    metFORMIN (GLUCOPHAGE) 500 mg tablet Take 1 tablet (500 mg total) by mouth daily    metroNIDAZOLE (FLAGYL) 500 mg tablet     metroNIDAZOLE (Flagyl) 500 mg tablet Take by mouth    neomycin (MYCIFRADIN) 500 mg tablet Take by mouth    prednisoLONE acetate (PRED FORTE) 1 % ophthalmic suspension 1 DROP IN SURGICAL EYE FOUR TIMES A DAY FOR FOUR DAYS    terbinafine (LamISIL) 250 mg tablet     [DISCONTINUED] amLODIPine (NORVASC) 5 mg tablet TAKE ONE TABLET BY MOUTH EVERY DAY    [DISCONTINUED] pravastatin (PRAVACHOL) 40 mg tablet TAKE ONE TABLET BY MOUTH EVERY DAY    [DISCONTINUED] Wixela Inhub 250-50 MCG/DOSE inhaler INHALE 1 PUFF 2 TIMES DAILY BY MOUTH    hydrocortisone 2 5 % lotion Apply topically daily (Patient not taking: Reported on 1/4/2021)    sodium chloride (OCEAN) 0 65 % nasal spray 1 spray into each nostril every 2 (two) hours as needed for congestion for up to 30 days (Patient not taking: Reported on 1/4/2021) No current facility-administered medications on file prior to visit  He is allergic to nyquil multi-symptom [pseudoeph-doxylamine-dm-apap] and penicillins       Review of Systems   Constitutional: Negative for appetite change, chills, fatigue and fever  HENT: Negative for sore throat and trouble swallowing  Eyes: Negative for redness  Respiratory: Negative for shortness of breath  Cardiovascular: Negative for chest pain and palpitations  Gastrointestinal: Negative for abdominal pain, constipation and diarrhea  Genitourinary: Negative for dysuria and hematuria  Musculoskeletal: Negative for back pain and neck pain  Skin: Negative for rash  Neurological: Negative for seizures, weakness and headaches  Hematological: Negative for adenopathy  Psychiatric/Behavioral: Negative for confusion  The patient is not nervous/anxious  Objective:      /85 (BP Location: Left arm, Patient Position: Sitting, Cuff Size: Standard)   Pulse 65   Temp 98 °F (36 7 °C) (Temporal)   Ht 5' 5" (1 651 m)   Wt 83 5 kg (184 lb)   SpO2 97%   BMI 30 62 kg/m²     Recent Results (from the past 1344 hour(s))   NOVEL CORONAVIRUS (COVID-19), PCR [facility]    Collection Time: 01/02/21  9:20 AM    Specimen type and source: Specimen (LAB)   Result Value Ref Range    Expected Procedure Date 20,210,107     SARS Coronavirus 2 PCR Not Detected Not Detected    Specimen Description Nasopharyngeal swab     First test? Unknown     Prior test type? Not applicable     Prior test result? Not applicable     Prior test date?       ^^^UNKNA^Unknown or not applicable^L^^^Unknown or not applicable    Employed in healthcare setting? Unknown     Symptomatic as defined by CDC? Unknown     Date of symptom onset?       ^^^UNKNA^Unknown or not applicable^L^^^Unknown or not applicable    Currently hospitalized? Unknown     In ICU? Unknown     Resident in congregate care setting? Unknown     Pregnant?  Not applicable         Physical Exam  Constitutional:       General: He is not in acute distress  Appearance: Normal appearance  HENT:      Head: Normocephalic and atraumatic  Nose: Nose normal       Mouth/Throat:      Mouth: Mucous membranes are moist    Eyes:      Extraocular Movements: Extraocular movements intact  Pupils: Pupils are equal, round, and reactive to light  Cardiovascular:      Rate and Rhythm: Normal rate and regular rhythm  Pulses: Normal pulses  Heart sounds: Normal heart sounds  No murmur  No friction rub  Pulmonary:      Effort: Pulmonary effort is normal  No respiratory distress  Breath sounds: Normal breath sounds  No wheezing  Abdominal:      General: Abdomen is flat  Bowel sounds are normal  There is no distension  Palpations: Abdomen is soft  There is no mass  Tenderness: There is no abdominal tenderness  There is no guarding  Musculoskeletal: Normal range of motion  Neurological:      General: No focal deficit present  Mental Status: He is alert and oriented to person, place, and time  Mental status is at baseline  Cranial Nerves: No cranial nerve deficit  Psychiatric:         Mood and Affect: Mood normal          Behavior: Behavior normal        BMI Counseling: Body mass index is 30 62 kg/m²  The BMI is above normal  Nutrition recommendations include reducing portion sizes  POCT ECG     Date/Time 1/4/2021 12:51 PM     Performed by  Ana Aaron MD     Authorized by Aan Aaron MD      [facility] Protocol   Patient understanding: patient states understanding of the procedure being performed  Patient consent: the patient's understanding of the procedure matches consent given       Procedure Details   Procedure Notes: NSR with RBBB no new changes    Patient Transportation: confirmed  Patient tolerance: patient tolerated the procedure well with no immediate complications

## 2021-01-06 DIAGNOSIS — J45.40 MODERATE PERSISTENT ASTHMA WITHOUT COMPLICATION: Primary | ICD-10-CM

## 2021-01-06 RX ORDER — ALBUTEROL SULFATE 90 UG/1
2 AEROSOL, METERED RESPIRATORY (INHALATION) EVERY 6 HOURS PRN
Qty: 3 INHALER | Refills: 0 | Status: SHIPPED | OUTPATIENT
Start: 2021-01-06 | End: 2021-09-20 | Stop reason: SDUPTHER

## 2021-01-08 DIAGNOSIS — J45.40 MODERATE PERSISTENT ASTHMA WITHOUT COMPLICATION: Primary | ICD-10-CM

## 2021-01-08 RX ORDER — ALBUTEROL SULFATE 2.5 MG/3ML
2.5 SOLUTION RESPIRATORY (INHALATION) EVERY 6 HOURS PRN
Qty: 120 VIAL | Refills: 3 | Status: SHIPPED | OUTPATIENT
Start: 2021-01-08

## 2021-02-11 ENCOUNTER — TELEPHONE (OUTPATIENT)
Dept: INTERNAL MEDICINE CLINIC | Facility: CLINIC | Age: 71
End: 2021-02-11

## 2021-02-11 NOTE — TELEPHONE ENCOUNTER
The women called back and said she gave the wrong number for the surgeon  If he is not cleared you need to call [phone]

## 2021-02-11 NOTE — TELEPHONE ENCOUNTER
Patient came in on Jan 4th for a surgical clearance for his colon  You had cleared him for his surgery but he came down with covid on Jan 17th  [facility] pre- admission screening called and asked if he is still cleared to get surgery tomorrow  If he is still cleared, we need to get a note that he can still proceed with surgery tomorrow  [phone] is the number for the woman who called from pre admission with [facility]  If he is not cleared for surgery, you need to reach out to the surgeon and let him know  The surgeon's number is - [phone]  The surgeon is Boni

## 2021-02-19 DIAGNOSIS — I10 HYPERTENSION, UNSPECIFIED TYPE: ICD-10-CM

## 2021-02-19 DIAGNOSIS — E11.9 TYPE 2 DIABETES MELLITUS WITHOUT COMPLICATION, WITH LONG-TERM CURRENT USE OF INSULIN (HCC): ICD-10-CM

## 2021-02-19 DIAGNOSIS — Z79.4 TYPE 2 DIABETES MELLITUS WITHOUT COMPLICATION, WITH LONG-TERM CURRENT USE OF INSULIN (HCC): ICD-10-CM

## 2021-02-19 PROCEDURE — 4010F ACE/ARB THERAPY RXD/TAKEN: CPT | Performed by: INTERNAL MEDICINE

## 2021-02-19 RX ORDER — LISINOPRIL 40 MG/1
40 TABLET ORAL DAILY
Qty: 90 TABLET | Refills: 0 | Status: SHIPPED | OUTPATIENT
Start: 2021-02-19 | End: 2021-06-02 | Stop reason: SDUPTHER

## 2021-03-01 ENCOUNTER — OFFICE VISIT (OUTPATIENT)
Dept: INTERNAL MEDICINE CLINIC | Facility: CLINIC | Age: 71
End: 2021-03-01
Payer: COMMERCIAL

## 2021-03-01 VITALS
BODY MASS INDEX: 30.32 KG/M2 | HEART RATE: 88 BPM | DIASTOLIC BLOOD PRESSURE: 85 MMHG | OXYGEN SATURATION: 97 % | SYSTOLIC BLOOD PRESSURE: 140 MMHG | TEMPERATURE: 97.2 F | WEIGHT: 182 LBS | HEIGHT: 65 IN

## 2021-03-01 DIAGNOSIS — E11.9 TYPE 2 DIABETES MELLITUS WITHOUT COMPLICATION, WITH LONG-TERM CURRENT USE OF INSULIN (HCC): Primary | ICD-10-CM

## 2021-03-01 DIAGNOSIS — K59.09 OTHER CONSTIPATION: ICD-10-CM

## 2021-03-01 DIAGNOSIS — J45.40 MODERATE PERSISTENT ASTHMA WITHOUT COMPLICATION: ICD-10-CM

## 2021-03-01 DIAGNOSIS — E78.5 HYPERLIPIDEMIA, UNSPECIFIED HYPERLIPIDEMIA TYPE: ICD-10-CM

## 2021-03-01 DIAGNOSIS — Z79.4 TYPE 2 DIABETES MELLITUS WITHOUT COMPLICATION, WITH LONG-TERM CURRENT USE OF INSULIN (HCC): Primary | ICD-10-CM

## 2021-03-01 DIAGNOSIS — I10 HYPERTENSION, UNSPECIFIED TYPE: ICD-10-CM

## 2021-03-01 DIAGNOSIS — C67.9 MALIGNANT NEOPLASM OF URINARY BLADDER, UNSPECIFIED SITE (HCC): ICD-10-CM

## 2021-03-01 PROCEDURE — 99214 OFFICE O/P EST MOD 30 MIN: CPT | Performed by: INTERNAL MEDICINE

## 2021-03-01 PROCEDURE — 3077F SYST BP >= 140 MM HG: CPT | Performed by: INTERNAL MEDICINE

## 2021-03-01 PROCEDURE — 3079F DIAST BP 80-89 MM HG: CPT | Performed by: INTERNAL MEDICINE

## 2021-03-01 PROCEDURE — 1160F RVW MEDS BY RX/DR IN RCRD: CPT | Performed by: INTERNAL MEDICINE

## 2021-03-01 PROCEDURE — 3008F BODY MASS INDEX DOCD: CPT | Performed by: INTERNAL MEDICINE

## 2021-03-01 RX ORDER — TRAMADOL HYDROCHLORIDE 50 MG/1
TABLET ORAL
COMMUNITY
Start: 2021-02-15 | End: 2021-05-07

## 2021-03-01 NOTE — PROGRESS NOTES
Assessment/Plan: This is a 64-year-old gentleman who recently had abdominal surgery for cecal mass and adhesions  Doing well after the surgery  1  Type 2 diabetes mellitus without complication, with long-term current use of insulin ([facility])  -     CBC and differential; Future  -     Comprehensive metabolic panel; Future  -     Hemoglobin A1C; Future  -     IRIS Diabetic eye exam    2  Moderate persistent asthma without complication    3  Hyperlipidemia, unspecified hyperlipidemia type    4  Malignant neoplasm of urinary bladder, unspecified site ([facility] )    5  Hypertension, unspecified type    6  Other constipation  -     magnesium hydroxide (MILK OF MAGNESIA) 400 mg/5 mL oral suspension; Take 15 mL by mouth daily at bedtime  -     psyllium (METAMUCIL,KONSYL) 30 9 %; Take 7 5 g by mouth daily at bedtime           1  Type 2 diabetes mellitus without complication, with long-term current use of insulin ([facility])    - CBC and differential; Future  - Comprehensive metabolic panel; Future  - Hemoglobin A1C; Future    2  Moderate persistent asthma without complication      3  Hyperlipidemia, unspecified hyperlipidemia type      4  Malignant neoplasm of urinary bladder, unspecified site ([facility] )      5  Hypertension, unspecified type      6  Other constipation    - magnesium hydroxide (MILK OF MAGNESIA) 400 mg/5 mL oral suspension; Take 15 mL by mouth daily at bedtime  Dispense: 500 mL; Refill: 1  - psyllium (METAMUCIL,KONSYL) 30 9 %; Take 7 5 g by mouth daily at bedtime  Dispense: 500 g; Refill: 2           Subjective:      Patient ID: Dann Primrose is a 65 y o  male      HPI    The following portions of the patient's history were reviewed and updated as appropriate: He  has a past medical history of Asthma, Bladder cancer ([facility] ), Cancer ([facility] [address] ), Diabetes mellitus due to underlying condition, controlled, without complication, without long-term current use of insulin ([facility] [facility] ), HTN (hypertension), Hyperlipidemia, Hyperlipidemia, Hypertension, Lung disease, Non-seasonal allergic rhinitis, Obstructive sleep apnea (adult) (pediatric), Other asthma, Rhinophyma, and Sleep apnea with use of continuous positive airway pressure (CPAP)  He   Patient Active Problem List    Diagnosis Date Noted    Diabetes mellitus due to underlying condition, controlled, without complication, without long-term current use of insulin ([facility] )     Asthma     Bladder cancer ([facility] )     Sarcoid 10/10/2017     He  has a past surgical history that includes Appendectomy; Cystoscopy; pr repair of nasal septum (N/A, 12/11/2017); pr nasal/sinus ndsc w/total ethoidectomy (N/A, 12/11/2017); Bladder surgery; Prostate surgery; and Colonoscopy (02/10/2016)  His family history includes Coronary artery disease in his brother; Dementia in his sister; Diabetes in his father and mother; Heart disease in his father and mother; Hypertension in his family; Lung cancer in his brother  He  reports that he has never smoked  He has never used smokeless tobacco  He reports that he does not drink alcohol or use drugs  Current Outpatient Medications   Medication Sig Dispense Refill    albuterol (2 5 mg/3 mL) 0 083 % nebulizer solution Take 1 vial (2 5 mg total) by nebulization every 6 (six) hours as needed for wheezing or shortness of breath 120 vial 3    albuterol (PROVENTIL HFA,VENTOLIN HFA) 90 mcg/act inhaler Inhale 2 puffs every 6 (six) hours as needed for wheezing 3 Inhaler 0    amLODIPine (NORVASC) 5 mg tablet Take 1 tablet (5 mg total) by mouth daily 90 tablet 1    Cetirizine HCl 10 MG CAPS Take by mouth      doxycycline (ADOXA) 100 MG tablet Take 1 tablet (100 mg total) by mouth daily 90 tablet 0    fluticasone (FLONASE) 50 mcg/act nasal spray 2 sprays into each nostril daily 3 Bottle 1    fluticasone-salmeterol (Wixela Inhub) 250-50 mcg/dose inhaler Inhale 1 puff 2 (two) times a day Rinse mouth after use   180 each 1    lisinopril (ZESTRIL) 40 mg tablet Take 1 tablet (40 mg total) by mouth daily 90 tablet 0    metFORMIN (GLUCOPHAGE) 500 mg tablet Take 1 tablet (500 mg total) by mouth daily 90 tablet 0    metroNIDAZOLE (FLAGYL) 500 mg tablet       metroNIDAZOLE (Flagyl) 500 mg tablet Take by mouth      neomycin (MYCIFRADIN) 500 mg tablet Take by mouth      pravastatin (PRAVACHOL) 40 mg tablet Take 1 tablet (40 mg total) by mouth daily 90 tablet 1    prednisoLONE acetate (PRED FORTE) 1 % ophthalmic suspension 1 DROP IN SURGICAL EYE FOUR TIMES A DAY FOR FOUR DAYS      terbinafine (LamISIL) 250 mg tablet       traMADol (ULTRAM) 50 mg tablet TAKE 1 TABLET (50 MG TOTAL) BY MOUTH EVERY 6 HOURS AS NEEDED FOR MODERATE PAIN (PAIN SCORE 4 6)      hydrocortisone 2 5 % lotion Apply topically daily (Patient not taking: Reported on 1/4/2021) 200 mL 2    magnesium hydroxide (MILK OF MAGNESIA) 400 mg/5 mL oral suspension Take 15 mL by mouth daily at bedtime 500 mL 1    psyllium (METAMUCIL,KONSYL) 30 9 % Take 7 5 g by mouth daily at bedtime 500 g 2    sodium chloride (OCEAN) 0 65 % nasal spray 1 spray into each nostril every 2 (two) hours as needed for congestion for up to 30 days (Patient not taking: Reported on 1/4/2021) 15 mL 0     No current facility-administered medications for this visit        Current Outpatient Medications on File Prior to Visit   Medication Sig    albuterol (2 5 mg/3 mL) 0 083 % nebulizer solution Take 1 vial (2 5 mg total) by nebulization every 6 (six) hours as needed for wheezing or shortness of breath    albuterol (PROVENTIL HFA,VENTOLIN HFA) 90 mcg/act inhaler Inhale 2 puffs every 6 (six) hours as needed for wheezing    amLODIPine (NORVASC) 5 mg tablet Take 1 tablet (5 mg total) by mouth daily    Cetirizine HCl 10 MG CAPS Take by mouth    doxycycline (ADOXA) 100 MG tablet Take 1 tablet (100 mg total) by mouth daily    fluticasone (FLONASE) 50 mcg/act nasal spray 2 sprays into each nostril daily    fluticasone-salmeterol (Illene Gunderson) 250-50 mcg/dose inhaler Inhale 1 puff 2 (two) times a day Rinse mouth after use   lisinopril (ZESTRIL) 40 mg tablet Take 1 tablet (40 mg total) by mouth daily    metFORMIN (GLUCOPHAGE) 500 mg tablet Take 1 tablet (500 mg total) by mouth daily    metroNIDAZOLE (FLAGYL) 500 mg tablet     metroNIDAZOLE (Flagyl) 500 mg tablet Take by mouth    neomycin (MYCIFRADIN) 500 mg tablet Take by mouth    pravastatin (PRAVACHOL) 40 mg tablet Take 1 tablet (40 mg total) by mouth daily    prednisoLONE acetate (PRED FORTE) 1 % ophthalmic suspension 1 DROP IN SURGICAL EYE FOUR TIMES A DAY FOR FOUR DAYS    terbinafine (LamISIL) 250 mg tablet     traMADol (ULTRAM) 50 mg tablet TAKE 1 TABLET (50 MG TOTAL) BY MOUTH EVERY 6 HOURS AS NEEDED FOR MODERATE PAIN (PAIN SCORE 4 6)    hydrocortisone 2 5 % lotion Apply topically daily (Patient not taking: Reported on 1/4/2021)    sodium chloride (OCEAN) 0 65 % nasal spray 1 spray into each nostril every 2 (two) hours as needed for congestion for up to 30 days (Patient not taking: Reported on 1/4/2021)     No current facility-administered medications on file prior to visit  He is allergic to nyquil multi-symptom [pseudoeph-doxylamine-dm-apap] and penicillins       Review of Systems   Constitutional: Negative for appetite change, chills, fatigue and fever  HENT: Negative for sore throat and trouble swallowing  Eyes: Negative for redness  Respiratory: Negative for shortness of breath  Cardiovascular: Negative for chest pain and palpitations  Gastrointestinal: Negative for abdominal pain, constipation and diarrhea  Genitourinary: Negative for dysuria and hematuria  Musculoskeletal: Negative for back pain and neck pain  Skin: Negative for rash  Neurological: Negative for seizures, weakness and headaches  Hematological: Negative for adenopathy  Psychiatric/Behavioral: Negative for confusion  The patient is not nervous/anxious            Objective:      /85 (BP Location: Left arm, Patient Position: Sitting, Cuff Size: Standard)   Pulse 88   Temp (!) 97 2 °F (36 2 °C) (Temporal)   Ht 5' 5" (1 651 m)   Wt 82 6 kg (182 lb)   SpO2 97%   BMI 30 29 kg/m²     Recent Results (from the past 1344 hour(s))   NOVEL CORONAVIRUS (COVID-19), PCR [facility]    Collection Time: 01/16/21  7:32 PM    Specimen type and source: Nasopharynx (LAB)   Result Value Ref Range    SARS Coronavirus 2 PCR Detected (A) Not Detected    Specimen Description NASOPHARYNGEAL SWAB     First test? YES     Prior test type? Not applicable     Prior test result? Not applicable     Prior test date?       ^^^UNKNA^Unknown or not applicable^L^^^Unknown or not applicable    Employed in healthcare setting? No     Symptomatic as defined by CDC? YES     Date of symptom onset?       ^^^UNKNA^Unknown or not applicable^L^^^Unknown or not applicable    Currently hospitalized? No     In ICU? Unknown     [facility]? No     Pregnant? Not applicable    NOVEL CORONAVIRUS (COVID-19), PCR [facility]    Collection Time: 02/08/21  8:39 AM    Specimen type and source: Specimen (LAB)   Result Value Ref Range    Expected Procedure Date 20,210,121     SARS Coronavirus 2 PCR Not Detected Not Detected    Specimen Description Nasopharyngeal swab     First test? Unknown     Prior test type? Not applicable     Prior test result? Not applicable     Prior test date?       ^^^UNKNA^Unknown or not applicable^L^^^Unknown or not applicable    Employed in healthcare setting? No     Symptomatic as defined by CDC? No     Date of symptom onset?       ^^^UNKNA^Unknown or not applicable^L^^^Unknown or not applicable    Currently hospitalized? Unknown     In ICU? Unknown     Resident in congregate care setting? Unknown     Pregnant?  Not applicable    HEMOGLOBIN A1C    Collection Time: 02/08/21  8:39 AM   Result Value Ref Range    Hemoglobin A1C 6 6 (H) <5 7 %    eAG, EST AVG Glucose 143 <154 mg/dL        Physical Exam  Constitutional: General: He is not in acute distress  Appearance: Normal appearance  HENT:      Head: Normocephalic and atraumatic  Nose: Nose normal       Mouth/Throat:      Mouth: Mucous membranes are moist    Eyes:      Extraocular Movements: Extraocular movements intact  Pupils: Pupils are equal, round, and reactive to light  Cardiovascular:      Rate and Rhythm: Normal rate and regular rhythm  Pulses: Normal pulses  Heart sounds: Normal heart sounds  No murmur  No friction rub  Pulmonary:      Effort: Pulmonary effort is normal  No respiratory distress  Breath sounds: Normal breath sounds  No wheezing  Abdominal:      General: Abdomen is flat  Bowel sounds are normal  There is no distension  Palpations: Abdomen is soft  There is no mass  Tenderness: There is no abdominal tenderness  There is no guarding  Musculoskeletal: Normal range of motion  Neurological:      General: No focal deficit present  Mental Status: He is alert and oriented to person, place, and time  Mental status is at baseline  Cranial Nerves: No cranial nerve deficit     Psychiatric:         Mood and Affect: Mood normal          Behavior: Behavior normal

## 2021-03-01 NOTE — PATIENT INSTRUCTIONS

## 2021-04-15 DIAGNOSIS — I10 HYPERTENSION, UNSPECIFIED TYPE: ICD-10-CM

## 2021-04-15 DIAGNOSIS — E78.5 HYPERLIPIDEMIA, UNSPECIFIED HYPERLIPIDEMIA TYPE: ICD-10-CM

## 2021-04-15 RX ORDER — PRAVASTATIN SODIUM 40 MG
40 TABLET ORAL DAILY
Qty: 90 TABLET | Refills: 0 | Status: SHIPPED | OUTPATIENT
Start: 2021-04-15 | End: 2021-07-07 | Stop reason: SDUPTHER

## 2021-04-15 RX ORDER — AMLODIPINE BESYLATE 5 MG/1
5 TABLET ORAL DAILY
Qty: 90 TABLET | Refills: 0 | Status: SHIPPED | OUTPATIENT
Start: 2021-04-15 | End: 2021-07-07 | Stop reason: SDUPTHER

## 2021-05-01 ENCOUNTER — APPOINTMENT (OUTPATIENT)
Dept: LAB | Facility: HOSPITAL | Age: 71
End: 2021-05-01
Attending: INTERNAL MEDICINE
Payer: COMMERCIAL

## 2021-05-01 DIAGNOSIS — E11.9 TYPE 2 DIABETES MELLITUS WITHOUT COMPLICATION, WITH LONG-TERM CURRENT USE OF INSULIN (HCC): ICD-10-CM

## 2021-05-01 DIAGNOSIS — L71.1 RHINOPHYMA: ICD-10-CM

## 2021-05-01 DIAGNOSIS — Z79.4 TYPE 2 DIABETES MELLITUS WITHOUT COMPLICATION, WITH LONG-TERM CURRENT USE OF INSULIN (HCC): ICD-10-CM

## 2021-05-01 DIAGNOSIS — E08.9 DIABETES MELLITUS DUE TO UNDERLYING CONDITION, CONTROLLED, WITHOUT COMPLICATION, WITHOUT LONG-TERM CURRENT USE OF INSULIN (HCC): ICD-10-CM

## 2021-05-01 DIAGNOSIS — J32.1 CHRONIC FRONTAL SINUSITIS: ICD-10-CM

## 2021-05-01 LAB
ALBUMIN SERPL BCP-MCNC: 3.5 G/DL (ref 3.5–5)
ALP SERPL-CCNC: 96 U/L (ref 46–116)
ALT SERPL W P-5'-P-CCNC: 26 U/L (ref 12–78)
ANION GAP SERPL CALCULATED.3IONS-SCNC: 5 MMOL/L (ref 4–13)
AST SERPL W P-5'-P-CCNC: 20 U/L (ref 5–45)
BASOPHILS # BLD AUTO: 0.07 THOUSANDS/ΜL (ref 0–0.1)
BASOPHILS NFR BLD AUTO: 1 % (ref 0–1)
BILIRUB SERPL-MCNC: 0.84 MG/DL (ref 0.2–1)
BUN SERPL-MCNC: 14 MG/DL (ref 5–25)
CALCIUM SERPL-MCNC: 8.7 MG/DL (ref 8.3–10.1)
CHLORIDE SERPL-SCNC: 106 MMOL/L (ref 100–108)
CO2 SERPL-SCNC: 28 MMOL/L (ref 21–32)
CREAT SERPL-MCNC: 0.92 MG/DL (ref 0.6–1.3)
EOSINOPHIL # BLD AUTO: 0.35 THOUSAND/ΜL (ref 0–0.61)
EOSINOPHIL NFR BLD AUTO: 5 % (ref 0–6)
ERYTHROCYTE [DISTWIDTH] IN BLOOD BY AUTOMATED COUNT: 15.3 % (ref 11.6–15.1)
EST. AVERAGE GLUCOSE BLD GHB EST-MCNC: 131 MG/DL
GFR SERPL CREATININE-BSD FRML MDRD: 83 ML/MIN/1.73SQ M
GLUCOSE P FAST SERPL-MCNC: 112 MG/DL (ref 65–99)
HBA1C MFR BLD: 6.2 %
HCT VFR BLD AUTO: 44.1 % (ref 36.5–49.3)
HGB BLD-MCNC: 14.1 G/DL (ref 12–17)
IMM GRANULOCYTES # BLD AUTO: 0.04 THOUSAND/UL (ref 0–0.2)
IMM GRANULOCYTES NFR BLD AUTO: 1 % (ref 0–2)
LYMPHOCYTES # BLD AUTO: 1.31 THOUSANDS/ΜL (ref 0.6–4.47)
LYMPHOCYTES NFR BLD AUTO: 19 % (ref 14–44)
MCH RBC QN AUTO: 29.5 PG (ref 26.8–34.3)
MCHC RBC AUTO-ENTMCNC: 32 G/DL (ref 31.4–37.4)
MCV RBC AUTO: 92 FL (ref 82–98)
MONOCYTES # BLD AUTO: 0.94 THOUSAND/ΜL (ref 0.17–1.22)
MONOCYTES NFR BLD AUTO: 14 % (ref 4–12)
NEUTROPHILS # BLD AUTO: 4.26 THOUSANDS/ΜL (ref 1.85–7.62)
NEUTS SEG NFR BLD AUTO: 60 % (ref 43–75)
NRBC BLD AUTO-RTO: 0 /100 WBCS
PLATELET # BLD AUTO: 170 THOUSANDS/UL (ref 149–390)
PMV BLD AUTO: 12.6 FL (ref 8.9–12.7)
POTASSIUM SERPL-SCNC: 3.7 MMOL/L (ref 3.5–5.3)
PROT SERPL-MCNC: 7.9 G/DL (ref 6.4–8.2)
RBC # BLD AUTO: 4.78 MILLION/UL (ref 3.88–5.62)
SODIUM SERPL-SCNC: 139 MMOL/L (ref 136–145)
WBC # BLD AUTO: 6.97 THOUSAND/UL (ref 4.31–10.16)

## 2021-05-01 PROCEDURE — 3044F HG A1C LEVEL LT 7.0%: CPT | Performed by: INTERNAL MEDICINE

## 2021-05-01 PROCEDURE — 36415 COLL VENOUS BLD VENIPUNCTURE: CPT

## 2021-05-01 PROCEDURE — 85025 COMPLETE CBC W/AUTO DIFF WBC: CPT

## 2021-05-01 PROCEDURE — 80053 COMPREHEN METABOLIC PANEL: CPT

## 2021-05-01 PROCEDURE — 83036 HEMOGLOBIN GLYCOSYLATED A1C: CPT

## 2021-05-07 ENCOUNTER — OFFICE VISIT (OUTPATIENT)
Dept: INTERNAL MEDICINE CLINIC | Facility: CLINIC | Age: 71
End: 2021-05-07
Payer: COMMERCIAL

## 2021-05-07 VITALS
DIASTOLIC BLOOD PRESSURE: 78 MMHG | OXYGEN SATURATION: 97 % | HEART RATE: 70 BPM | TEMPERATURE: 97.5 F | WEIGHT: 181 LBS | BODY MASS INDEX: 30.16 KG/M2 | SYSTOLIC BLOOD PRESSURE: 135 MMHG | HEIGHT: 65 IN

## 2021-05-07 DIAGNOSIS — J45.40 MODERATE PERSISTENT ASTHMA WITHOUT COMPLICATION: ICD-10-CM

## 2021-05-07 DIAGNOSIS — L21.0 DANDRUFF IN ADULT: ICD-10-CM

## 2021-05-07 DIAGNOSIS — L71.1 RHINOPHYMA: ICD-10-CM

## 2021-05-07 DIAGNOSIS — I10 HYPERTENSION, UNSPECIFIED TYPE: ICD-10-CM

## 2021-05-07 DIAGNOSIS — C67.9 MALIGNANT NEOPLASM OF URINARY BLADDER, UNSPECIFIED SITE (HCC): ICD-10-CM

## 2021-05-07 DIAGNOSIS — E11.9 TYPE 2 DIABETES MELLITUS WITHOUT COMPLICATION, WITH LONG-TERM CURRENT USE OF INSULIN (HCC): ICD-10-CM

## 2021-05-07 DIAGNOSIS — Z01.818 PREOP EXAM FOR INTERNAL MEDICINE: Primary | ICD-10-CM

## 2021-05-07 DIAGNOSIS — Z79.4 TYPE 2 DIABETES MELLITUS WITHOUT COMPLICATION, WITH LONG-TERM CURRENT USE OF INSULIN (HCC): ICD-10-CM

## 2021-05-07 DIAGNOSIS — E78.5 HYPERLIPIDEMIA, UNSPECIFIED HYPERLIPIDEMIA TYPE: ICD-10-CM

## 2021-05-07 PROCEDURE — 3008F BODY MASS INDEX DOCD: CPT | Performed by: INTERNAL MEDICINE

## 2021-05-07 PROCEDURE — 3725F SCREEN DEPRESSION PERFORMED: CPT | Performed by: INTERNAL MEDICINE

## 2021-05-07 PROCEDURE — 1160F RVW MEDS BY RX/DR IN RCRD: CPT | Performed by: INTERNAL MEDICINE

## 2021-05-07 PROCEDURE — 99243 OFF/OP CNSLTJ NEW/EST LOW 30: CPT | Performed by: INTERNAL MEDICINE

## 2021-05-07 RX ORDER — KETOCONAZOLE 20 MG/ML
1 SHAMPOO TOPICAL 2 TIMES WEEKLY
Qty: 120 ML | Refills: 1 | Status: SHIPPED | OUTPATIENT
Start: 2021-05-10 | End: 2021-05-14 | Stop reason: SDUPTHER

## 2021-05-07 NOTE — PROGRESS NOTES
Diabetic Foot Exam    Patient's shoes and socks removed  Right Foot/Ankle   Right Foot Inspection  Skin Exam: skin normal and skin intact no dry skin, no warmth, no callus, no erythema, no maceration, no abnormal color, no pre-ulcer, no ulcer and no callus                          Toe Exam: ROM and strength within normal limits no right toe deformity  Sensory       Monofilament testing: intact  Vascular  Capillary refills: elevated  The right DP pulse is 1+  The right PT pulse is 1+  Left Foot/Ankle  Left Foot Inspection  Skin Exam: skin normal and skin intactno dry skin, no warmth, no erythema, no maceration, normal color, no pre-ulcer, no ulcer and no callus                         Toe Exam: ROM and strength within normal limitsno left toe deformity                   Sensory       Monofilament: intact  Vascular  Capillary refills: < 3 seconds  The left DP pulse is 1+  The left PT pulse is 1+  Assign Risk Category:  No deformity present; No loss of protective sensation;  No weak pulses       Risk: 0

## 2021-05-07 NOTE — PROGRESS NOTES
Assessment/Plan: This is a 64-year-old gentleman with a history of hypertension diabetes allergies rhinophyma hyperlipidemia asthma colon mass and sarcoidosis  He is scheduled for surgery for his rhinophyma  He is at low risk of cardiac complications for the proposed surgery  He was advised to proceed with surgery  1  Preop exam for internal medicine  Comments:  Patient advised to proceed with surgery  2  Rhinophyma  Comments:  Proceed with surgery as planned by otolaryngology  3  Hypertension, unspecified type  Comments:  Controlled on current regimen continue amlodipine and lisinopril  4  Hyperlipidemia, unspecified hyperlipidemia type  Comments:  Continue pravastatin 40 mg daily  5  Type 2 diabetes mellitus without complication, with long-term current use of insulin ([facility])  Comments:  Controlled on daily metformin  Labs were reviewed with patient    6  Moderate persistent asthma without complication  Comments:  Stable on inhalers and meds  7  Malignant neoplasm of urinary bladder, unspecified site ([facility] [address] )    8  Dandruff in adult  Comments:  Ketoconazole shampoo was prescribed  Orders:  -     ketoconazole (NIZORAL) 2 % shampoo; Apply 1 application topically 2 (two) times a week           1  Preop exam for internal medicine      2  Hypertension, unspecified type      3  Hyperlipidemia, unspecified hyperlipidemia type      4  Type 2 diabetes mellitus without complication, with long-term current use of insulin ([facility] [address] )      5  Moderate persistent asthma without complication             Subjective:      Patient ID: Lang Edgar is a 65 y o  male  This is a 64-year-old gentleman with a history of hypertension diabetes allergies rhinophyma hyperlipidemia asthma colon mass and sarcoidosis  He is scheduled for surgery for his rhinophyma  He denies any chest pain or shortness of breath        The following portions of the patient's history were reviewed and updated as appropriate: He  has a past medical history of Asthma, Bladder cancer ([facility] ), Cancer ([place] [address] ), Diabetes mellitus due to underlying condition, controlled, without complication, without long-term current use of insulin ([place] [address] ), HTN (hypertension), Hyperlipidemia, Hyperlipidemia, Hypertension, Lung disease, Non-seasonal allergic rhinitis, Obstructive sleep apnea (adult) (pediatric), Other asthma, Rhinophyma, and Sleep apnea with use of continuous positive airway pressure (CPAP)  He   Patient Active Problem List    Diagnosis Date Noted    Diabetes mellitus due to underlying condition, controlled, without complication, without long-term current use of insulin ([place] [address] )     Asthma     Bladder cancer ([place] [address] )     Sarcoid 10/10/2017     He  has a past surgical history that includes Appendectomy; Cystoscopy; pr repair of nasal septum (N/A, 12/11/2017); pr nasal/sinus ndsc w/total ethoidectomy (N/A, 12/11/2017); Bladder surgery; Prostate surgery; Colonoscopy (02/10/2016); and CECECTOMY LAPAROSCOPIC  His family history includes Coronary artery disease in his brother; Dementia in his sister; Diabetes in his father and mother; Heart disease in his father and mother; Hypertension in his family; Lung cancer in his brother  He  reports that he has never smoked  He has never used smokeless tobacco  He reports that he does not drink alcohol or use drugs    Current Outpatient Medications   Medication Sig Dispense Refill    albuterol (2 5 mg/3 mL) 0 083 % nebulizer solution Take 1 vial (2 5 mg total) by nebulization every 6 (six) hours as needed for wheezing or shortness of breath 120 vial 3    albuterol (PROVENTIL HFA,VENTOLIN HFA) 90 mcg/act inhaler Inhale 2 puffs every 6 (six) hours as needed for wheezing 3 Inhaler 0    amLODIPine (NORVASC) 5 mg tablet Take 1 tablet (5 mg total) by mouth daily 90 tablet 0    Cetirizine HCl 10 MG CAPS Take by mouth      fluticasone (FLONASE) 50 mcg/act nasal spray 2 sprays into each nostril daily 3 Bottle 1    fluticasone-salmeterol (Wixela Inhub) 250-50 mcg/dose inhaler Inhale 1 puff 2 (two) times a day Rinse mouth after use  180 each 1    lisinopril (ZESTRIL) 40 mg tablet Take 1 tablet (40 mg total) by mouth daily 90 tablet 0    magnesium hydroxide (MILK OF MAGNESIA) 400 mg/5 mL oral suspension Take 15 mL by mouth daily at bedtime 500 mL 1    metFORMIN (GLUCOPHAGE) 500 mg tablet Take 1 tablet (500 mg total) by mouth daily 90 tablet 0    pravastatin (PRAVACHOL) 40 mg tablet Take 1 tablet (40 mg total) by mouth daily 90 tablet 0    psyllium (METAMUCIL,KONSYL) 30 9 % Take 7 5 g by mouth daily at bedtime 500 g 2    [START ON 5/10/2021] ketoconazole (NIZORAL) 2 % shampoo Apply 1 application topically 2 (two) times a week 120 mL 1     No current facility-administered medications for this visit  Current Outpatient Medications on File Prior to Visit   Medication Sig    albuterol (2 5 mg/3 mL) 0 083 % nebulizer solution Take 1 vial (2 5 mg total) by nebulization every 6 (six) hours as needed for wheezing or shortness of breath    albuterol (PROVENTIL HFA,VENTOLIN HFA) 90 mcg/act inhaler Inhale 2 puffs every 6 (six) hours as needed for wheezing    amLODIPine (NORVASC) 5 mg tablet Take 1 tablet (5 mg total) by mouth daily    Cetirizine HCl 10 MG CAPS Take by mouth    fluticasone (FLONASE) 50 mcg/act nasal spray 2 sprays into each nostril daily    fluticasone-salmeterol (Wixela Inhub) 250-50 mcg/dose inhaler Inhale 1 puff 2 (two) times a day Rinse mouth after use      lisinopril (ZESTRIL) 40 mg tablet Take 1 tablet (40 mg total) by mouth daily    magnesium hydroxide (MILK OF MAGNESIA) 400 mg/5 mL oral suspension Take 15 mL by mouth daily at bedtime    metFORMIN (GLUCOPHAGE) 500 mg tablet Take 1 tablet (500 mg total) by mouth daily    pravastatin (PRAVACHOL) 40 mg tablet Take 1 tablet (40 mg total) by mouth daily    psyllium (METAMUCIL,KONSYL) 30 9 % Take 7 5 g by mouth daily at bedtime     No current facility-administered medications on file prior to visit  He is allergic to nyquil multi-symptom [pseudoeph-doxylamine-dm-apap] and penicillins       Review of Systems   Constitutional: Negative for appetite change, chills, fatigue and fever  HENT: Positive for congestion  Negative for sore throat and trouble swallowing  Nasal blockage   Eyes: Negative for redness  Respiratory: Negative for shortness of breath  Cardiovascular: Negative for chest pain and palpitations  Gastrointestinal: Negative for abdominal pain, constipation and diarrhea  Genitourinary: Negative for dysuria and hematuria  Musculoskeletal: Negative for back pain and neck pain  Skin: Negative for rash  Neurological: Negative for seizures, weakness and headaches  Hematological: Negative for adenopathy  Psychiatric/Behavioral: Negative for confusion  The patient is not nervous/anxious  Objective:      /78 (BP Location: Right arm, Patient Position: Sitting, Cuff Size: Standard)   Pulse 70   Temp 97 5 °F (36 4 °C) (Temporal)   Ht 5' 5" (1 651 m)   Wt 82 1 kg (181 lb)   SpO2 97%   BMI 30 12 kg/m²     Recent Results (from the past 1344 hour(s))   Comprehensive metabolic panel    Collection Time: 05/01/21  8:29 AM   Result Value Ref Range    Sodium 139 136 - 145 mmol/L    Potassium 3 7 3 5 - 5 3 mmol/L    Chloride 106 100 - 108 mmol/L    CO2 28 21 - 32 mmol/L    ANION GAP 5 4 - 13 mmol/L    BUN 14 5 - 25 mg/dL    Creatinine 0 92 0 60 - 1 30 mg/dL    Glucose, Fasting 112 (H) 65 - 99 mg/dL    Calcium 8 7 8 3 - 10 1 mg/dL    AST 20 5 - 45 U/L    ALT 26 12 - 78 U/L    Alkaline Phosphatase 96 46 - 116 U/L    Total Protein 7 9 6 4 - 8 2 g/dL    Albumin 3 5 3 5 - 5 0 g/dL    Total Bilirubin 0 84 0 20 - 1 00 mg/dL    eGFR 83 ml/min/1 73sq m   Hemoglobin A1C    Collection Time: 05/01/21  8:29 AM   Result Value Ref Range    Hemoglobin A1C 6 2 (H) Normal 3 8-5 6%; PreDiabetic 5 7-6 4%;  Diabetic >=6 5%; Glycemic control for adults with diabetes <7 0% %     mg/dl   CBC and differential    Collection Time: 05/01/21  8:29 AM   Result Value Ref Range    WBC 6 97 4 31 - 10 16 Thousand/uL    RBC 4 78 3 88 - 5 62 Million/uL    Hemoglobin 14 1 12 0 - 17 0 g/dL    Hematocrit 44 1 36 5 - 49 3 %    MCV 92 82 - 98 fL    MCH 29 5 26 8 - 34 3 pg    MCHC 32 0 31 4 - 37 4 g/dL    RDW 15 3 (H) 11 6 - 15 1 %    MPV 12 6 8 9 - 12 7 fL    Platelets 495 761 - 730 Thousands/uL    nRBC 0 /100 WBCs    Neutrophils Relative 60 43 - 75 %    Immat GRANS % 1 0 - 2 %    Lymphocytes Relative 19 14 - 44 %    Monocytes Relative 14 (H) 4 - 12 %    Eosinophils Relative 5 0 - 6 %    Basophils Relative 1 0 - 1 %    Neutrophils Absolute 4 26 1 85 - 7 62 Thousands/µL    Immature Grans Absolute 0 04 0 00 - 0 20 Thousand/uL    Lymphocytes Absolute 1 31 0 60 - 4 47 Thousands/µL    Monocytes Absolute 0 94 0 17 - 1 22 Thousand/µL    Eosinophils Absolute 0 35 0 00 - 0 61 Thousand/µL    Basophils Absolute 0 07 0 00 - 0 10 Thousands/µL        Physical Exam  Constitutional:       General: He is not in acute distress  Appearance: Normal appearance  HENT:      Head: Normocephalic and atraumatic  Nose: Nose normal       Comments: Rhinophyma present     Mouth/Throat:      Mouth: Mucous membranes are moist    Eyes:      Extraocular Movements: Extraocular movements intact  Pupils: Pupils are equal, round, and reactive to light  Neck:      Musculoskeletal: Normal range of motion and neck supple  Cardiovascular:      Rate and Rhythm: Normal rate and regular rhythm  Pulses: Normal pulses  Heart sounds: Normal heart sounds  No murmur  No friction rub  Pulmonary:      Effort: Pulmonary effort is normal  No respiratory distress  Breath sounds: Normal breath sounds  No wheezing  Abdominal:      General: Abdomen is flat  Bowel sounds are normal  There is no distension  Palpations: Abdomen is soft  There is no mass        Tenderness: There is no abdominal tenderness  There is no guarding  Musculoskeletal: Normal range of motion  Neurological:      General: No focal deficit present  Mental Status: He is alert and oriented to person, place, and time  Mental status is at baseline  Cranial Nerves: No cranial nerve deficit     Psychiatric:         Mood and Affect: Mood normal          Behavior: Behavior normal

## 2021-05-07 NOTE — LETTER
May 9, 2021     Girish Hurtado, [address] [address]    Patient: Suhas Cervantes   YOB: 1950   Date of Visit: 5/7/2021       Dear Dr Duran La: Thank you for referring Suhas Cervantes to me for evaluation  Below are my notes for this consultation  If you have questions, please do not hesitate to call me  I look forward to following your patient along with you  Sincerely,        Merline Roller, MD        CC: No Recipients  Merline Roller, MD  5/9/2021  6:57 PM  Signed  Assessment/Plan: This is a 27-year-old gentleman with a history of hypertension diabetes allergies rhinophyma hyperlipidemia asthma colon mass and sarcoidosis  He is scheduled for surgery for his rhinophyma  He is at low risk of cardiac complications for the proposed surgery  He was advised to proceed with surgery  1  Preop exam for internal medicine  Comments:  Patient advised to proceed with surgery  2  Rhinophyma  Comments:  Proceed with surgery as planned by otolaryngology  3  Hypertension, unspecified type  Comments:  Controlled on current regimen continue amlodipine and lisinopril  4  Hyperlipidemia, unspecified hyperlipidemia type  Comments:  Continue pravastatin 40 mg daily  5  Type 2 diabetes mellitus without complication, with long-term current use of insulin (HCC)  Comments:  Controlled on daily metformin  Labs were reviewed with patient    6  Moderate persistent asthma without complication  Comments:  Stable on inhalers and meds  7  Malignant neoplasm of urinary bladder, unspecified site ([facility] [address] )    8  Dandruff in adult  Comments:  Ketoconazole shampoo was prescribed  Orders:  -     ketoconazole (NIZORAL) 2 % shampoo; Apply 1 application topically 2 (two) times a week           1  Preop exam for internal medicine      2  Hypertension, unspecified type      3  Hyperlipidemia, unspecified hyperlipidemia type      4   Type 2 diabetes mellitus without complication, with long-term current use of insulin ([facility] [address] )      5  Moderate persistent asthma without complication             Subjective:      Patient ID: Holden Castellon is a 65 y o  male  This is a 68-year-old gentleman with a history of hypertension diabetes allergies rhinophyma hyperlipidemia asthma colon mass and sarcoidosis  He is scheduled for surgery for his rhinophyma  He denies any chest pain or shortness of breath  The following portions of the patient's history were reviewed and updated as appropriate: He  has a past medical history of Asthma, Bladder cancer ([facility] [address] ), Cancer ([facility] [address] ), Diabetes mellitus due to underlying condition, controlled, without complication, without long-term current use of insulin ([facility] [address] ), HTN (hypertension), Hyperlipidemia, Hyperlipidemia, Hypertension, Lung disease, Non-seasonal allergic rhinitis, Obstructive sleep apnea (adult) (pediatric), Other asthma, Rhinophyma, and Sleep apnea with use of continuous positive airway pressure (CPAP)  He   Patient Active Problem List    Diagnosis Date Noted    Diabetes mellitus due to underlying condition, controlled, without complication, without long-term current use of insulin ([facility] [address] )     Asthma     Bladder cancer ([facility] [address] )     Sarcoid 10/10/2017     He  has a past surgical history that includes Appendectomy; Cystoscopy; pr repair of nasal septum (N/A, 12/11/2017); pr nasal/sinus ndsc w/total ethoidectomy (N/A, 12/11/2017); Bladder surgery; Prostate surgery; Colonoscopy (02/10/2016); and CECECTOMY LAPAROSCOPIC  His family history includes Coronary artery disease in his brother; Dementia in his sister; Diabetes in his father and mother; Heart disease in his father and mother; Hypertension in his family; Lung cancer in his brother  He  reports that he has never smoked  He has never used smokeless tobacco  He reports that he does not drink alcohol or use drugs    Current Outpatient Medications   Medication Sig Dispense Refill    albuterol (2 5 mg/3 mL) 0 083 % nebulizer solution Take 1 vial (2 5 mg total) by nebulization every 6 (six) hours as needed for wheezing or shortness of breath 120 vial 3    albuterol (PROVENTIL HFA,VENTOLIN HFA) 90 mcg/act inhaler Inhale 2 puffs every 6 (six) hours as needed for wheezing 3 Inhaler 0    amLODIPine (NORVASC) 5 mg tablet Take 1 tablet (5 mg total) by mouth daily 90 tablet 0    Cetirizine HCl 10 MG CAPS Take by mouth      fluticasone (FLONASE) 50 mcg/act nasal spray 2 sprays into each nostril daily 3 Bottle 1    fluticasone-salmeterol (Wixela Inhub) 250-50 mcg/dose inhaler Inhale 1 puff 2 (two) times a day Rinse mouth after use  180 each 1    lisinopril (ZESTRIL) 40 mg tablet Take 1 tablet (40 mg total) by mouth daily 90 tablet 0    magnesium hydroxide (MILK OF MAGNESIA) 400 mg/5 mL oral suspension Take 15 mL by mouth daily at bedtime 500 mL 1    metFORMIN (GLUCOPHAGE) 500 mg tablet Take 1 tablet (500 mg total) by mouth daily 90 tablet 0    pravastatin (PRAVACHOL) 40 mg tablet Take 1 tablet (40 mg total) by mouth daily 90 tablet 0    psyllium (METAMUCIL,KONSYL) 30 9 % Take 7 5 g by mouth daily at bedtime 500 g 2    [START ON 5/10/2021] ketoconazole (NIZORAL) 2 % shampoo Apply 1 application topically 2 (two) times a week 120 mL 1     No current facility-administered medications for this visit        Current Outpatient Medications on File Prior to Visit   Medication Sig    albuterol (2 5 mg/3 mL) 0 083 % nebulizer solution Take 1 vial (2 5 mg total) by nebulization every 6 (six) hours as needed for wheezing or shortness of breath    albuterol (PROVENTIL HFA,VENTOLIN HFA) 90 mcg/act inhaler Inhale 2 puffs every 6 (six) hours as needed for wheezing    amLODIPine (NORVASC) 5 mg tablet Take 1 tablet (5 mg total) by mouth daily    Cetirizine HCl 10 MG CAPS Take by mouth    fluticasone (FLONASE) 50 mcg/act nasal spray 2 sprays into each nostril daily    fluticasone-salmeterol (Jaime Mae) 250-50 mcg/dose inhaler Inhale 1 puff 2 (two) times a day Rinse mouth after use   lisinopril (ZESTRIL) 40 mg tablet Take 1 tablet (40 mg total) by mouth daily    magnesium hydroxide (MILK OF MAGNESIA) 400 mg/5 mL oral suspension Take 15 mL by mouth daily at bedtime    metFORMIN (GLUCOPHAGE) 500 mg tablet Take 1 tablet (500 mg total) by mouth daily    pravastatin (PRAVACHOL) 40 mg tablet Take 1 tablet (40 mg total) by mouth daily    psyllium (METAMUCIL,KONSYL) 30 9 % Take 7 5 g by mouth daily at bedtime     No current facility-administered medications on file prior to visit  He is allergic to nyquil multi-symptom [pseudoeph-doxylamine-dm-apap] and penicillins       Review of Systems   Constitutional: Negative for appetite change, chills, fatigue and fever  HENT: Positive for congestion  Negative for sore throat and trouble swallowing  Nasal blockage   Eyes: Negative for redness  Respiratory: Negative for shortness of breath  Cardiovascular: Negative for chest pain and palpitations  Gastrointestinal: Negative for abdominal pain, constipation and diarrhea  Genitourinary: Negative for dysuria and hematuria  Musculoskeletal: Negative for back pain and neck pain  Skin: Negative for rash  Neurological: Negative for seizures, weakness and headaches  Hematological: Negative for adenopathy  Psychiatric/Behavioral: Negative for confusion  The patient is not nervous/anxious            Objective:      /78 (BP Location: Right arm, Patient Position: Sitting, Cuff Size: Standard)   Pulse 70   Temp 97 5 °F (36 4 °C) (Temporal)   Ht 5' 5" (1 651 m)   Wt 82 1 kg (181 lb)   SpO2 97%   BMI 30 12 kg/m²     Recent Results (from the past 1344 hour(s))   Comprehensive metabolic panel    Collection Time: 05/01/21  8:29 AM   Result Value Ref Range    Sodium 139 136 - 145 mmol/L    Potassium 3 7 3 5 - 5 3 mmol/L    Chloride 106 100 - 108 mmol/L    CO2 28 21 - 32 mmol/L    ANION GAP 5 4 - 13 mmol/L    BUN 14 5 - 25 mg/dL    Creatinine 0 92 0 60 - 1 30 mg/dL    Glucose, Fasting 112 (H) 65 - 99 mg/dL    Calcium 8 7 8 3 - 10 1 mg/dL    AST 20 5 - 45 U/L    ALT 26 12 - 78 U/L    Alkaline Phosphatase 96 46 - 116 U/L    Total Protein 7 9 6 4 - 8 2 g/dL    Albumin 3 5 3 5 - 5 0 g/dL    Total Bilirubin 0 84 0 20 - 1 00 mg/dL    eGFR 83 ml/min/1 73sq m   Hemoglobin A1C    Collection Time: 05/01/21  8:29 AM   Result Value Ref Range    Hemoglobin A1C 6 2 (H) Normal 3 8-5 6%; PreDiabetic 5 7-6 4%; Diabetic >=6 5%; Glycemic control for adults with diabetes <7 0% %     mg/dl   CBC and differential    Collection Time: 05/01/21  8:29 AM   Result Value Ref Range    WBC 6 97 4 31 - 10 16 Thousand/uL    RBC 4 78 3 88 - 5 62 Million/uL    Hemoglobin 14 1 12 0 - 17 0 g/dL    Hematocrit 44 1 36 5 - 49 3 %    MCV 92 82 - 98 fL    MCH 29 5 26 8 - 34 3 pg    MCHC 32 0 31 4 - 37 4 g/dL    RDW 15 3 (H) 11 6 - 15 1 %    MPV 12 6 8 9 - 12 7 fL    Platelets 882 970 - 023 Thousands/uL    nRBC 0 /100 WBCs    Neutrophils Relative 60 43 - 75 %    Immat GRANS % 1 0 - 2 %    Lymphocytes Relative 19 14 - 44 %    Monocytes Relative 14 (H) 4 - 12 %    Eosinophils Relative 5 0 - 6 %    Basophils Relative 1 0 - 1 %    Neutrophils Absolute 4 26 1 85 - 7 62 Thousands/µL    Immature Grans Absolute 0 04 0 00 - 0 20 Thousand/uL    Lymphocytes Absolute 1 31 0 60 - 4 47 Thousands/µL    Monocytes Absolute 0 94 0 17 - 1 22 Thousand/µL    Eosinophils Absolute 0 35 0 00 - 0 61 Thousand/µL    Basophils Absolute 0 07 0 00 - 0 10 Thousands/µL        Physical Exam  Constitutional:       General: He is not in acute distress  Appearance: Normal appearance  HENT:      Head: Normocephalic and atraumatic  Nose: Nose normal       Comments: Rhinophyma present     Mouth/Throat:      Mouth: Mucous membranes are moist    Eyes:      Extraocular Movements: Extraocular movements intact  Pupils: Pupils are equal, round, and reactive to light  Neck:      Musculoskeletal: Normal range of motion and neck supple  Cardiovascular:      Rate and Rhythm: Normal rate and regular rhythm  Pulses: Normal pulses  Heart sounds: Normal heart sounds  No murmur  No friction rub  Pulmonary:      Effort: Pulmonary effort is normal  No respiratory distress  Breath sounds: Normal breath sounds  No wheezing  Abdominal:      General: Abdomen is flat  Bowel sounds are normal  There is no distension  Palpations: Abdomen is soft  There is no mass  Tenderness: There is no abdominal tenderness  There is no guarding  Musculoskeletal: Normal range of motion  Neurological:      General: No focal deficit present  Mental Status: He is alert and oriented to person, place, and time  Mental status is at baseline  Cranial Nerves: No cranial nerve deficit     Psychiatric:         Mood and Affect: Mood normal          Behavior: Behavior normal

## 2021-05-14 DIAGNOSIS — L21.0 DANDRUFF IN ADULT: ICD-10-CM

## 2021-05-14 RX ORDER — KETOCONAZOLE 20 MG/ML
1 SHAMPOO TOPICAL 2 TIMES WEEKLY
Qty: 360 ML | Refills: 1 | Status: SHIPPED | OUTPATIENT
Start: 2021-05-17 | End: 2022-07-26 | Stop reason: SDUPTHER

## 2021-05-21 ENCOUNTER — OFFICE VISIT (OUTPATIENT)
Dept: DENTISTRY | Facility: CLINIC | Age: 71
End: 2021-05-21

## 2021-05-21 VITALS — TEMPERATURE: 98 F | DIASTOLIC BLOOD PRESSURE: 72 MMHG | HEART RATE: 73 BPM | SYSTOLIC BLOOD PRESSURE: 129 MMHG

## 2021-05-21 DIAGNOSIS — K02.9 DENTAL CARIES: Primary | ICD-10-CM

## 2021-05-21 PROCEDURE — D2330 RESIN-BASED COMPOSITE - 1 SURFACE, ANTERIOR: HCPCS | Performed by: DENTIST

## 2021-05-21 PROCEDURE — D2391 RESIN-BASED COMPOSITE - 1 SURFACE, POSTERIOR: HCPCS | Performed by: DENTIST

## 2021-05-21 NOTE — PROGRESS NOTES
Composite Filling    Brandon Gambino presents for #22 F and #29 B  PMH reviewed, no changes  Applied topical benzocaine, administered  1 carps 4% articaine 1:100k epi via buccal infiltration  Prepped tooth #22,29 with 245 carbide on high speed  Caries removed with round carbide on slow speed  Placed tofflemire/palodent matrix  Isolation with cotton rolls and dri-angles    Etch with 37% H2PO4, rinse, dry  Applied Adhese with 20 second scrub once, gentle air dry and light cured for 10s  Restored with Tetric bulk dion shade A2 and light cured  Refined with finishing burs, polished with enhance point  Verified occlusion and contacts  Pt left satisfied and good health       NV: periodic exam in 6 months    Dr Bertha Neves November

## 2021-05-24 NOTE — PRE-PROCEDURE INSTRUCTIONS
Pre-Surgery Instructions:   Medication Instructions    albuterol (2 5 mg/3 mL) 0 083 % nebulizer solution Instructed patient per Anesthesia Guidelines   albuterol (PROVENTIL HFA,VENTOLIN HFA) 90 mcg/act inhaler Instructed patient per Anesthesia Guidelines   amLODIPine (NORVASC) 5 mg tablet Instructed patient per Anesthesia Guidelines   Cetirizine HCl 10 MG CAPS Instructed patient per Anesthesia Guidelines   fluticasone (FLONASE) 50 mcg/act nasal spray Instructed patient per Anesthesia Guidelines   fluticasone-salmeterol (Wixela Inhub) 250-50 mcg/dose inhaler Instructed patient per Anesthesia Guidelines   ketoconazole (NIZORAL) 2 % shampoo Instructed patient per Anesthesia Guidelines   lisinopril (ZESTRIL) 40 mg tablet Instructed patient per Anesthesia Guidelines   magnesium hydroxide (MILK OF MAGNESIA) 400 mg/5 mL oral suspension Instructed patient per Anesthesia Guidelines   metFORMIN (GLUCOPHAGE) 500 mg tablet Instructed patient per Anesthesia Guidelines   pravastatin (PRAVACHOL) 40 mg tablet Instructed patient per Anesthesia Guidelines   psyllium (METAMUCIL,KONSYL) 30 9 % Instructed patient per Anesthesia Guidelines  Instructed to take Amlodipine with sip of water and to use Albuterol inhaler, Wixela inhaler, and Flonase nasal spray the morning of surgery  No aspirin, NSAIDs, vitamins, or supplements before surgery starting today

## 2021-05-27 ENCOUNTER — ANESTHESIA EVENT (OUTPATIENT)
Dept: PERIOP | Facility: HOSPITAL | Age: 71
End: 2021-05-27
Payer: COMMERCIAL

## 2021-05-27 NOTE — ANESTHESIA PREPROCEDURE EVALUATION
Procedure:  RESECTION OF RHINOPHYMA RECURRENT EXCISION OF RHINOPHYMA (N/A Nose)    Relevant Problems   ANESTHESIA (within normal limits)      CARDIO   (+) HTN (hypertension)   (+) Hyperlipidemia      ENDO (within normal limits)      /RENAL (within normal limits)      HEMATOLOGY (within normal limits)      NEURO/PSYCH (within normal limits)      PULMONARY   (+) Asthma   (+) Sleep apnea with use of continuous positive airway pressure (CPAP)      Other   (+) Bladder cancer (HCC)   (+) Diabetes mellitus due to underlying condition, controlled, without complication, without long-term current use of insulin (HCC)   (+) Rhinophyma   (+) Sarcoid      EKG 1/4/2021:  NSR with RBBB no new changes  Lab Results   Component Value Date    WBC 6 97 05/01/2021    HGB 14 1 05/01/2021    HCT 44 1 05/01/2021    MCV 92 05/01/2021     05/01/2021     Lab Results   Component Value Date    SODIUM 139 05/01/2021    K 3 7 05/01/2021     05/01/2021    CO2 28 05/01/2021    BUN 14 05/01/2021    CREATININE 0 92 05/01/2021    CALCIUM 8 7 05/01/2021     No results found for: INR, PROTIME  Lab Results   Component Value Date    HGBA1C 6 2 (H) 05/01/2021          Physical Exam    Airway    Mallampati score: III  TM Distance: >3 FB  Neck ROM: full     Dental   No notable dental hx     Cardiovascular  Cardiovascular exam normal    Pulmonary  Pulmonary exam normal     Other Findings        Anesthesia Plan  ASA Score- 3     Anesthesia Type- general with ASA Monitors  Additional Monitors:   Airway Plan: ETT  Plan Factors-Exercise tolerance (METS): >4 METS  Chart reviewed  EKG reviewed  Existing labs reviewed  Patient summary reviewed  Induction- intravenous  Postoperative Plan-     Informed Consent- Anesthetic plan and risks discussed with patient  I personally reviewed this patient with the CRNA  Discussed and agreed on the Anesthesia Plan with the CRNA  Jessenia Shi

## 2021-05-28 ENCOUNTER — HOSPITAL ENCOUNTER (OUTPATIENT)
Facility: HOSPITAL | Age: 71
Setting detail: OUTPATIENT SURGERY
Discharge: HOME | End: 2021-05-28
Attending: OTOLARYNGOLOGY | Admitting: OTOLARYNGOLOGY
Payer: COMMERCIAL

## 2021-05-28 ENCOUNTER — ANESTHESIA (OUTPATIENT)
Dept: PERIOP | Facility: HOSPITAL | Age: 71
End: 2021-05-28
Payer: COMMERCIAL

## 2021-05-28 VITALS
TEMPERATURE: 96.6 F | BODY MASS INDEX: 29.99 KG/M2 | RESPIRATION RATE: 16 BRPM | SYSTOLIC BLOOD PRESSURE: 139 MMHG | HEART RATE: 86 BPM | HEIGHT: 65 IN | WEIGHT: 180 LBS | OXYGEN SATURATION: 94 % | DIASTOLIC BLOOD PRESSURE: 75 MMHG

## 2021-05-28 DIAGNOSIS — L71.1 RHINOPHYMA: ICD-10-CM

## 2021-05-28 LAB
GLUCOSE SERPL-MCNC: 129 MG/DL (ref 65–140)
GLUCOSE SERPL-MCNC: 132 MG/DL (ref 65–140)

## 2021-05-28 PROCEDURE — 88312 SPECIAL STAINS GROUP 1: CPT | Performed by: PATHOLOGY

## 2021-05-28 PROCEDURE — 88305 TISSUE EXAM BY PATHOLOGIST: CPT | Performed by: PATHOLOGY

## 2021-05-28 PROCEDURE — 30120 REVISION OF NOSE: CPT | Performed by: OTOLARYNGOLOGY

## 2021-05-28 PROCEDURE — 82948 REAGENT STRIP/BLOOD GLUCOSE: CPT

## 2021-05-28 RX ORDER — CEFAZOLIN SODIUM 2 G/50ML
2000 SOLUTION INTRAVENOUS ONCE
Status: COMPLETED | OUTPATIENT
Start: 2021-05-28 | End: 2021-05-28

## 2021-05-28 RX ORDER — CLINDAMYCIN HYDROCHLORIDE 150 MG/1
150 CAPSULE ORAL EVERY 8 HOURS SCHEDULED
Qty: 30 CAPSULE | Refills: 0 | Status: SHIPPED | OUTPATIENT
Start: 2021-05-28 | End: 2021-06-07

## 2021-05-28 RX ORDER — ONDANSETRON 2 MG/ML
INJECTION INTRAMUSCULAR; INTRAVENOUS AS NEEDED
Status: DISCONTINUED | OUTPATIENT
Start: 2021-05-28 | End: 2021-05-28

## 2021-05-28 RX ORDER — LIDOCAINE HYDROCHLORIDE AND EPINEPHRINE 10; 10 MG/ML; UG/ML
INJECTION, SOLUTION INFILTRATION; PERINEURAL AS NEEDED
Status: DISCONTINUED | OUTPATIENT
Start: 2021-05-28 | End: 2021-05-28 | Stop reason: HOSPADM

## 2021-05-28 RX ORDER — ONDANSETRON 2 MG/ML
4 INJECTION INTRAMUSCULAR; INTRAVENOUS ONCE AS NEEDED
Status: DISCONTINUED | OUTPATIENT
Start: 2021-05-28 | End: 2021-05-28 | Stop reason: HOSPADM

## 2021-05-28 RX ORDER — ACETAMINOPHEN 500 MG
500 TABLET ORAL EVERY 6 HOURS PRN
Qty: 40 TABLET | Refills: 0 | Status: SHIPPED | OUTPATIENT
Start: 2021-05-28

## 2021-05-28 RX ORDER — MIDAZOLAM HYDROCHLORIDE 2 MG/2ML
INJECTION, SOLUTION INTRAMUSCULAR; INTRAVENOUS AS NEEDED
Status: DISCONTINUED | OUTPATIENT
Start: 2021-05-28 | End: 2021-05-28

## 2021-05-28 RX ORDER — FENTANYL CITRATE/PF 50 MCG/ML
25 SYRINGE (ML) INJECTION
Status: DISCONTINUED | OUTPATIENT
Start: 2021-05-28 | End: 2021-05-28 | Stop reason: HOSPADM

## 2021-05-28 RX ORDER — ONDANSETRON 2 MG/ML
4 INJECTION INTRAMUSCULAR; INTRAVENOUS EVERY 6 HOURS PRN
Status: DISCONTINUED | OUTPATIENT
Start: 2021-05-28 | End: 2021-05-28 | Stop reason: HOSPADM

## 2021-05-28 RX ORDER — SODIUM CHLORIDE, SODIUM LACTATE, POTASSIUM CHLORIDE, CALCIUM CHLORIDE 600; 310; 30; 20 MG/100ML; MG/100ML; MG/100ML; MG/100ML
125 INJECTION, SOLUTION INTRAVENOUS CONTINUOUS
Status: DISCONTINUED | OUTPATIENT
Start: 2021-05-28 | End: 2021-05-28 | Stop reason: HOSPADM

## 2021-05-28 RX ORDER — EPHEDRINE SULFATE 50 MG/ML
INJECTION INTRAVENOUS AS NEEDED
Status: DISCONTINUED | OUTPATIENT
Start: 2021-05-28 | End: 2021-05-28

## 2021-05-28 RX ORDER — SODIUM CHLORIDE, SODIUM LACTATE, POTASSIUM CHLORIDE, CALCIUM CHLORIDE 600; 310; 30; 20 MG/100ML; MG/100ML; MG/100ML; MG/100ML
50 INJECTION, SOLUTION INTRAVENOUS CONTINUOUS
Status: DISCONTINUED | OUTPATIENT
Start: 2021-05-28 | End: 2021-05-28 | Stop reason: HOSPADM

## 2021-05-28 RX ORDER — SODIUM CHLORIDE, SODIUM LACTATE, POTASSIUM CHLORIDE, CALCIUM CHLORIDE 600; 310; 30; 20 MG/100ML; MG/100ML; MG/100ML; MG/100ML
INJECTION, SOLUTION INTRAVENOUS CONTINUOUS PRN
Status: DISCONTINUED | OUTPATIENT
Start: 2021-05-28 | End: 2021-05-28

## 2021-05-28 RX ORDER — SUCCINYLCHOLINE/SOD CL,ISO/PF 100 MG/5ML
SYRINGE (ML) INTRAVENOUS AS NEEDED
Status: DISCONTINUED | OUTPATIENT
Start: 2021-05-28 | End: 2021-05-28

## 2021-05-28 RX ORDER — ACETAMINOPHEN 325 MG/1
650 TABLET ORAL EVERY 6 HOURS PRN
Status: DISCONTINUED | OUTPATIENT
Start: 2021-05-28 | End: 2021-05-28 | Stop reason: HOSPADM

## 2021-05-28 RX ORDER — LIDOCAINE HYDROCHLORIDE 10 MG/ML
0.5 INJECTION, SOLUTION EPIDURAL; INFILTRATION; INTRACAUDAL; PERINEURAL ONCE AS NEEDED
Status: COMPLETED | OUTPATIENT
Start: 2021-05-28 | End: 2021-05-28

## 2021-05-28 RX ORDER — PROPOFOL 10 MG/ML
INJECTION, EMULSION INTRAVENOUS AS NEEDED
Status: DISCONTINUED | OUTPATIENT
Start: 2021-05-28 | End: 2021-05-28

## 2021-05-28 RX ORDER — FENTANYL CITRATE 50 UG/ML
INJECTION, SOLUTION INTRAMUSCULAR; INTRAVENOUS AS NEEDED
Status: DISCONTINUED | OUTPATIENT
Start: 2021-05-28 | End: 2021-05-28

## 2021-05-28 RX ADMIN — MIDAZOLAM 1 MG: 1 INJECTION INTRAMUSCULAR; INTRAVENOUS at 10:37

## 2021-05-28 RX ADMIN — FENTANYL CITRATE 50 MCG: 50 INJECTION INTRAMUSCULAR; INTRAVENOUS at 10:40

## 2021-05-28 RX ADMIN — EPHEDRINE SULFATE 10 MG: 50 INJECTION, SOLUTION INTRAVENOUS at 10:49

## 2021-05-28 RX ADMIN — PHENYLEPHRINE HYDROCHLORIDE 100 MCG/MIN: 10 INJECTION INTRAVENOUS at 10:54

## 2021-05-28 RX ADMIN — CEFAZOLIN SODIUM 2000 MG: 2 SOLUTION INTRAVENOUS at 10:37

## 2021-05-28 RX ADMIN — SODIUM CHLORIDE, SODIUM LACTATE, POTASSIUM CHLORIDE, AND CALCIUM CHLORIDE 50 ML/HR: .6; .31; .03; .02 INJECTION, SOLUTION INTRAVENOUS at 08:36

## 2021-05-28 RX ADMIN — PROPOFOL 150 MG: 10 INJECTION, EMULSION INTRAVENOUS at 10:40

## 2021-05-28 RX ADMIN — Medication 25 MCG: at 12:37

## 2021-05-28 RX ADMIN — Medication 100 MG: at 10:40

## 2021-05-28 RX ADMIN — Medication 25 MCG: at 12:24

## 2021-05-28 RX ADMIN — ONDANSETRON 4 MG: 2 INJECTION INTRAMUSCULAR; INTRAVENOUS at 11:40

## 2021-05-28 RX ADMIN — SODIUM CHLORIDE, SODIUM LACTATE, POTASSIUM CHLORIDE, AND CALCIUM CHLORIDE: .6; .31; .03; .02 INJECTION, SOLUTION INTRAVENOUS at 10:37

## 2021-05-28 RX ADMIN — LIDOCAINE HYDROCHLORIDE 0.5 ML: 10 INJECTION, SOLUTION EPIDURAL; INFILTRATION; INTRACAUDAL; PERINEURAL at 08:29

## 2021-05-28 NOTE — OP NOTE
OPERATIVE REPORT  PATIENT NAME: Louisa Ganser    :  1950  MRN: [id number]  Pt Location: [facility] OR ROOM 05    SURGERY DATE: 2021    Surgeon(s) and Role:     * Tomy Guo MD - Primary    Preop Diagnosis:  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]    Post-Op Diagnosis Codes:     * 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]    Procedure(s) (LRB):  RESECTION OF RHINOPHYMA RECURRENT EXCISION OF RHINOPHYMA (N/A)    Specimen(s):  ID Type Source Tests Collected by Time Destination   1 : rhinophyma of the nose Tissue Soft Tissue, Other TISSUE EXAM Tomy Guo MD 2021 [id number]        Estimated Blood Loss:   Minimal    Drains:  * No LDAs found *    Anesthesia Type:   General    Operative Indications:  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]      Operative Findings:  Areas of rhinophima, some foci recurrent on prior excisin site  Most of the lesiosn on the border of prior resection  Complications:   None    Procedure and Technique:  Patient was met in holding area, positively identified R/B/A reviewed, patient confirms consent  Patient was then transferred onto the operating table in the supine position  Appropriate monitoring devices were put in place, general anesthesia was administered by anesthesiologist and patient intubated without complications, tube taped in midline  The patient was prepped and draped in the usual clean fashion  Before proceeding further, a time-out was taken during which the patients identification and planned surgical procedure were confirmed  1% lidocaine with 1/100,000 epinephrine was injected subcutaneously along the base of the nasal pyramid bilaterally as well as on the dorsum to obtain nerve block  After allowing adequate time for anesthetic and vasoconstrictive effect, 1% lidocaine with 1/100,000 epinephrine was injected subcutaneously on the area of the rhinophyma along the right side of the nose  Surgery was begun by excising the rhinophyma on thin layers until proper contour of the nasal right lateral wall and ala was obtained  Pledgets with 1% lidocaine with 1/100,000 epinephrine were placed over the wound, compression was also done to reduce the bleeding  This reduced the overall bleeding  Attention was then directed to the nasal tip area, then dorsum where a similar maneuver was done until obtaining a straight dorsal profile  Subsequently attention was directed to the left nasal skin and in a similar fashion 1% lidocaine with 1/100,000 epinephrine was injected subcutaneously on the area of the rhinophyma along the left side of the nose and excised until planned contour obtained  Examination was done with digital palpation to assess the thickness of the skin and in areas were excess thickness was noticed additional layers were excised  In a similar fashion hemostasis was obtained with pledgets with 1% lidocaine with 1/100,000 epinephrine  followed by surgicel powder  All counts were correct at the end of the case  Patient was handed back to the anesthesiologist   Anesthesia  was discontinued, drapes were removed, patient awakened, extubated and taken to the recovery room in stable condition  and no complications were encountered      I was present for the entire procedure    Patient Disposition: PACU     SIGNATURE: Effie Mclean MD  DATE: May 28, 2021  TIME: 12:07 PM

## 2021-05-28 NOTE — ANESTHESIA POSTPROCEDURE EVALUATION
Post-Op Assessment Note    CV Status:  Stable  Pain Score: 0    Pain management: adequate     Mental Status:  Awake and sleepy   Hydration Status:  Euvolemic   PONV Controlled:  Controlled   Airway Patency:  Patent      Post Op Vitals Reviewed: Yes      Staff: CRNA         No complications documented      BP   119/73   Temp  97 2   Pulse 83   Resp 16   SpO2 98

## 2021-05-28 NOTE — DISCHARGE INSTRUCTIONS
May shower tomorrow  Call dr Nayely Anderson with sign if infection, fever  Call with uncontrolled pain, nausea and vomitting or bleeding

## 2021-05-28 NOTE — H&P
[facility]  Nigel Ribeiro 65 y o  male MRN: [id number]  Encounter: [id number]  Christine Damon MD  Office : [phone]  Also available on [employer]    Thank you for referring Nigel Ribeiro for an evaluation  My recommendations are included  Please do not hesitate to contact me with any questions you may have  ASSESSMENT AND PLAN:      1  Rhinophyma  doxycycline (ADOXA) 100 MG tablet   2  Chronic frontal sinusitis         Endoscopy reveals left nasal cavity wide open ethmoid, frontal sinuses  No polyps or discharge  Right nasal cavity with similar findings  Nasopharynx appears clear  There is no mucosal lesion or masses on the nasopharynx  Sphenoidal sinuses are clear  Has been treated long term with tetracycline, but some of the rhinophyma has relapsed  Patient developing inflammation, pain and local infection  Recommend resection of rhinophyma  Dicussed RBA's of procedure and postoperative expectations  After discussion, patient agrees to proceed with surgical intervention  He will follow up with our surgical scheduler  Continue doxycycline and Flonase  Follow up in 6 months   ______________________________________________________    Reason for consultation :Rhinophyma    HPI: Nigel Ribeiro is a 65 y o  male status post resection of rhinophyma, sp FESS  December 2017  Overall excellent appearance  Had nasal polyps, developed recurrence  Currently on Flonase and Doxycycline  No change in smell  Feels left-sided nasal obstruction  No significant rhinorrhea  Does have inflammation and pain on the outer skin of the nose  PRIOR VISIT, ENDOSCOPIC EVALUATION :   06/16/20 Left nasal cavity with no masses or polyps,left ethmoid cavity open, with no polyps,left maxillary sinus open, no lesions,normal appearing mucosa   Endoscopy done in a similar fashion on the right side,healthy mucosa on maxillary sinus, a cluster of 4-5 polyps 1 mm diameter is noticed on the right frontal recess  No discharge in any of the sinuses  Dilated septal vessel R located behind nasal valve, cauterized  Nasopharynx appears clear  There is no mucosal lesion or masses on the nasopharynx  The endoscope was then removed without complication patient tolerates procedure  REVIEW OF SYSTEMS:    Review of systems:  10 Point ROS was performed and negative except as above or otherwise noted in the medical record      Historical Information   Past Medical History:   Diagnosis Date    Asthma     Bladder cancer ([place] [address] )     Cancer ([place] [address] )     Bladder Cancer Hx    Constipation     CPAP (continuous positive airway pressure) dependence     stopped using    Diabetes mellitus ([place] [address] )     Diabetes mellitus due to underlying condition, controlled, without complication, without long-term current use of insulin ([place] [address] )     HTN (hypertension)     Hyperlipidemia     Hyperlipidemia     Hypertension     Lung disease     Non-seasonal allergic rhinitis     Obstructive sleep apnea (adult) (pediatric)     Other asthma     Rhinophyma     Sleep apnea with use of continuous positive airway pressure (CPAP)      Past Surgical History:   Procedure Laterality Date    APPENDECTOMY      BLADDER SURGERY      bladder cancer    CECECTOMY LAPAROSCOPIC      COLONOSCOPY  02/10/2016    Abnormal     CYSTOSCOPY      IL NASAL/SINUS NDSC W/TOTAL ETHOIDECTOMY N/A 12/11/2017    Procedure: ENDOSCOPIC SINUS SURGERY; EXCISION RHINOPHYMA;  Surgeon: Bryanna Turner MD;  [facility];  Service: ENT    IL REPAIR OF NASAL SEPTUM N/A 12/11/2017    Procedure: SEPTOPLASTY; TURBINOPLASTY;  Surgeon: Bryanna Turner MD;  Location[facility];  Service: ENT    PROSTATE SURGERY      prostate shaved      Social History   Social History     Substance and Sexual Activity   Alcohol Use No     Social History     Substance and Sexual Activity   Drug Use No     Social History     Tobacco Use   Smoking Status Never Smoker Smokeless Tobacco Never Used     Family History   Problem Relation Age of Onset    Diabetes Mother     Heart disease Mother     Diabetes Father     Heart disease Father     Dementia Sister         in [facility]    Coronary artery disease Brother     Hypertension Family     Lung cancer Brother        Meds/Allergies       Current Facility-Administered Medications:     ceFAZolin (ANCEF) IVPB (premix in dextrose) 2,000 mg 50 mL, 2,000 mg, Intravenous, Once    lactated ringers infusion, 50 mL/hr, Intravenous, Continuous, 50 mL/hr at 05/28/21 7518    Allergies   Allergen Reactions    Nyquil Multi-Symptom [Pseudoeph-Doxylamine-Dm-Apap] Shortness Of Breath     Caused an asthma attack    Penicillins      Patch test positive in childhood  Rash         PHYSICAL EXAM:    Blood pressure 121/69, pulse 74, temperature (!) 97 3 °F (36 3 °C), temperature source Tympanic, resp  rate 16, height 5' 5" (1 651 m), weight 81 6 kg (180 lb), SpO2 94 %  Body mass index is 29 95 kg/m²  Constitutional: general appearance, well groomed, well nourished, No acute distress and well developed  Eyes: ocular motility: normal gaze alignment and extraocular movement intact  otoscopic exam: external auditory canals patent normal wax  tympanic membranes intact, mobile  no effusion; External nose: Excellent contour, skin is inflamed and erythematous  Consistent with relapsing areas of rhinophyma and infected cysts despite antibiotic    nasal exam: septum midline , excellent patency, turbinates well healed no discharge  Oral cavity: lips without mucosal lesions or masses,no erythema, edema or vescicles in area of hypoesthesia   Benign gingiva and normal dentition, floor of mouth mobile tongue benign  Oropharynx: soft palate normal, symetric contraction, gag reflex present bilaterally, posterior pharynx clear; atrophic tonsils  Neurologic exam: No gross motor deficiency, CN II- XII grossly intact, no spontaneous nystagmus, possible hypoesthesis in affected lip otherwise no deficit  Neck: no cervical lymphadenopathy, no masses, normal size and consistency of major salivary glands and normal tracheal position; thyroid, normal size, normal consistency, no mass lesions  Pulmonary/Chest: lungs CTAB , Heart S1S RRR   Abdomen soft, non tneder  Musculoskeletal: Normal range of motion  Neurological: Cranial nerves 2-12 intact  Skin: Skin is warm and dry     Psychiatric: Normal mood and affect

## 2021-06-02 DIAGNOSIS — E11.9 TYPE 2 DIABETES MELLITUS WITHOUT COMPLICATION, WITH LONG-TERM CURRENT USE OF INSULIN (HCC): ICD-10-CM

## 2021-06-02 DIAGNOSIS — I10 HYPERTENSION, UNSPECIFIED TYPE: ICD-10-CM

## 2021-06-02 DIAGNOSIS — Z79.4 TYPE 2 DIABETES MELLITUS WITHOUT COMPLICATION, WITH LONG-TERM CURRENT USE OF INSULIN (HCC): ICD-10-CM

## 2021-06-02 PROCEDURE — 4010F ACE/ARB THERAPY RXD/TAKEN: CPT | Performed by: INTERNAL MEDICINE

## 2021-06-02 RX ORDER — LISINOPRIL 40 MG/1
40 TABLET ORAL DAILY
Qty: 90 TABLET | Refills: 0 | Status: SHIPPED | OUTPATIENT
Start: 2021-06-02 | End: 2021-09-02

## 2021-07-07 ENCOUNTER — OFFICE VISIT (OUTPATIENT)
Dept: INTERNAL MEDICINE CLINIC | Facility: CLINIC | Age: 71
End: 2021-07-07
Payer: COMMERCIAL

## 2021-07-07 ENCOUNTER — TELEPHONE (OUTPATIENT)
Dept: ADMINISTRATIVE | Facility: OTHER | Age: 71
End: 2021-07-07

## 2021-07-07 VITALS
DIASTOLIC BLOOD PRESSURE: 86 MMHG | BODY MASS INDEX: 30.49 KG/M2 | HEART RATE: 77 BPM | HEIGHT: 65 IN | TEMPERATURE: 98.5 F | WEIGHT: 183 LBS | SYSTOLIC BLOOD PRESSURE: 130 MMHG

## 2021-07-07 DIAGNOSIS — J45.40 MODERATE PERSISTENT ASTHMA WITHOUT COMPLICATION: ICD-10-CM

## 2021-07-07 DIAGNOSIS — E78.5 HYPERLIPIDEMIA, UNSPECIFIED HYPERLIPIDEMIA TYPE: ICD-10-CM

## 2021-07-07 DIAGNOSIS — E11.9 TYPE 2 DIABETES MELLITUS WITHOUT COMPLICATION, WITH LONG-TERM CURRENT USE OF INSULIN (HCC): ICD-10-CM

## 2021-07-07 DIAGNOSIS — I10 HYPERTENSION, UNSPECIFIED TYPE: Primary | ICD-10-CM

## 2021-07-07 DIAGNOSIS — L71.1 RHINOPHYMA: ICD-10-CM

## 2021-07-07 DIAGNOSIS — Z79.4 TYPE 2 DIABETES MELLITUS WITHOUT COMPLICATION, WITH LONG-TERM CURRENT USE OF INSULIN (HCC): ICD-10-CM

## 2021-07-07 PROCEDURE — 3008F BODY MASS INDEX DOCD: CPT | Performed by: INTERNAL MEDICINE

## 2021-07-07 PROCEDURE — 99214 OFFICE O/P EST MOD 30 MIN: CPT | Performed by: INTERNAL MEDICINE

## 2021-07-07 RX ORDER — AMLODIPINE BESYLATE 5 MG/1
5 TABLET ORAL DAILY
Qty: 90 TABLET | Refills: 1 | Status: SHIPPED | OUTPATIENT
Start: 2021-07-07 | End: 2021-10-12 | Stop reason: SDUPTHER

## 2021-07-07 RX ORDER — PRAVASTATIN SODIUM 40 MG
40 TABLET ORAL DAILY
Qty: 90 TABLET | Refills: 1 | Status: SHIPPED | OUTPATIENT
Start: 2021-07-07 | End: 2021-10-12 | Stop reason: SDUPTHER

## 2021-07-07 RX ORDER — LATANOPROST 50 UG/ML
SOLUTION/ DROPS OPHTHALMIC
COMMUNITY
Start: 2021-06-10

## 2021-07-07 NOTE — LETTER
Procedure Request Form: Colonoscopy   2nd Request      Date Requested: 21  Patient: Julia Dukes  Patient : 1950   Referring Provider: No primary care provider on file  Date of Procedure ______________________________       The above patient has informed us that they have completed their   most recent Colonoscopy at your facility  Please complete   this form and attach all corresponding procedure reports/results  Comments __________________________________________________________  ____________________________________________________________________  ____________________________________________________________________  ____________________________________________________________________    Facility Completing Procedure _________________________________________    Form Completed By (print name) _______________________________________      Signature __________________________________________________________      These reports are needed for  compliance    Please fax this completed form and a copy of the procedure report to our office located at [place] [address] as soon as possible to [phone] alexandria Johnsons: Phone [phone]    We thank you for your assistance in treating our mutual patient

## 2021-07-07 NOTE — TELEPHONE ENCOUNTER
----- Message from Kandi Broussard sent at 7/7/2021  9:19 AM EDT -----  Regarding: CRC  07/07/21 9:19 AM    Hello, our patient Joseph Felix has had CRC: Colonoscopy completed/performed  Please assist in updating the patient chart by making an External outreach to [facility] facility located in [place] [address]  The date of service is 2/10/16      Thank you,  Vanessa Stein  [facility] [address]

## 2021-07-07 NOTE — TELEPHONE ENCOUNTER
----- Message from Marie Churchill sent at 7/7/2021 11:10 AM EDT -----  Regarding: crc  07/07/21 11:10 AM    Hello, our patient Rachel Kelly has had CRC: Colonoscopy completed/performed  Please assist in updating the patient chart by pulling the Care Everywhere (CE) document  The date of service is 6/6/21       Thank you,  Dell Stein  [facility] [address]

## 2021-07-07 NOTE — LETTER
Procedure Request Form: Colonoscopy      Date Requested: 21  Patient: Glenora [place]  Patient : 1950   Referring Provider: No primary care provider on file  Date of Procedure ______________________________       The above patient has informed us that they have completed their   most recent Colonoscopy at your facility  Please complete   this form and attach all corresponding procedure reports/results  Comments __________________________________________________________  ____________________________________________________________________  ____________________________________________________________________  ____________________________________________________________________    Facility Completing Procedure _________________________________________    Form Completed By (print name) _______________________________________      Signature __________________________________________________________      These reports are needed for  compliance    Please fax this completed form and a copy of the procedure report to our office located at [place] [address] as soon as possible to [phone] alexandria Harrington: Phone [phone]    We thank you for your assistance in treating our mutual patient

## 2021-07-07 NOTE — LETTER
Procedure Request Form: Colonoscopy      Date Requested: 21  Patient: Marda Promise  Patient : 1950   Referring Provider: No primary care provider on file  Date of Procedure ______________________________       The above patient has informed us that they have completed their   most recent Colonoscopy at your facility  Please complete   this form and attach all corresponding procedure reports/results  Comments __________________________________________________________  ____________________________________________________________________  ____________________________________________________________________  ____________________________________________________________________    Facility Completing Procedure _________________________________________    Form Completed By (print name) _______________________________________      Signature __________________________________________________________      These reports are needed for  compliance    Please fax this completed form and a copy of the procedure report to our office located at [place] [address] as soon as possible to [phone] [facility]: Phone [phone]    We thank you for your assistance in treating our mutual patient

## 2021-07-07 NOTE — PROGRESS NOTES
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 and is stable postop  1  Hypertension, unspecified type  Comments:  Blood pressure stable continue lisinopril and amlodipine  Orders:  -     CBC and differential; Future  -     Comprehensive metabolic panel; Future  -     Lipid panel; Future  -     UA (URINE) with reflex to Scope  -     Hemoglobin A1C; Future  -     amLODIPine (NORVASC) 5 mg tablet; Take 1 tablet (5 mg total) by mouth daily    2  Type 2 diabetes mellitus without complication, with long-term current use of insulin ([facility] [address] )  Comments:  Blood work and medications reviewed his A1c stable at 6 2  Orders:  -     Hemoglobin A1C; Future  -     Microalbumin / creatinine urine ratio    3  Rhinophyma    4  Hyperlipidemia, unspecified hyperlipidemia type  Comments:  Continue pravastatin 40 mg daily  Orders:  -     pravastatin (PRAVACHOL) 40 mg tablet; Take 1 tablet (40 mg total) by mouth daily    5  Moderate persistent asthma without complication           There are no diagnoses linked to this encounter  Subjective:      Patient ID: Nicolette Parkinson is a 66 y o  male  This is a 75-year-old gentleman with a history of hypertension diabetes mellitus hyperlipidemia and asthma  He denies any chest pain or shortness of breath  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 and is stable postop        The following portions of the patient's history were reviewed and updated as appropriate: He  has a past medical history of Asthma, Bladder cancer ([address] ), Cancer ([address] ), Constipation, CPAP (continuous positive airway pressure) dependence, Diabetes mellitus ([address] ), Diabetes mellitus due to underlying condition, controlled, without complication, without long-term current use of insulin ([address] ), HTN (hypertension), Hyperlipidemia, Hyperlipidemia, Hypertension, Lung disease, Non-seasonal allergic rhinitis, Obstructive sleep apnea (adult) (pediatric), Other asthma, Rhinophyma, and Sleep apnea with use of continuous positive airway pressure (CPAP)  He   Patient Active Problem List    Diagnosis Date Noted    Sleep apnea with use of continuous positive airway pressure (CPAP)     HTN (hypertension)     Hyperlipidemia     Rhinophyma     Diabetes mellitus due to underlying condition, controlled, without complication, without long-term [facility] use of insulin [facility]     Asthma     Bladder cancer ([facility] )     Sarcoid 10/10/2017     He  has a past surgical history that includes Appendectomy; Cystoscopy; pr repair of nasal septum (N/A, 12/11/2017); pr nasal/sinus ndsc w/total ethoidectomy (N/A, 12/11/2017); Bladder surgery; Prostate surgery; Colonoscopy (02/10/2016); CECECTOMY LAPAROSCOPIC; and pr repair of nasal septum (N/A, 5/28/2021)  His family history includes Coronary artery disease in his brother; Dementia in his sister; Diabetes in his father and mother; Heart disease in his father and mother; Hypertension in his family; Lung cancer in his brother  He  reports that he has never smoked  He has never used smokeless tobacco  He reports that he does not drink alcohol and does not use drugs  Current Outpatient Medications   Medication Sig Dispense Refill    amLODIPine (NORVASC) 5 mg tablet Take 1 tablet (5 mg total) by mouth daily 90 tablet 1    Cetirizine HCl 10 MG CAPS Take by mouth daily       fluticasone (FLONASE) 50 mcg/act nasal spray 2 sprays into each nostril daily 3 Bottle 1    fluticasone-salmeterol (Wixela Inhub) 250-50 mcg/dose inhaler Inhale 1 puff 2 (two) times a day Rinse mouth after use   180 each 1    ketoconazole (NIZORAL) 2 % shampoo Apply 1 application topically 2 (two) times a week 360 mL 1    latanoprost (XALATAN) 0 005 % ophthalmic solution       lisinopril (ZESTRIL) 40 mg tablet Take 1 tablet (40 mg total) by mouth daily 90 tablet 0    metFORMIN (GLUCOPHAGE) 500 mg tablet Take 1 tablet (500 mg total) by mouth daily 90 tablet 0    pravastatin (PRAVACHOL) 40 mg tablet Take 1 tablet (40 mg total) by mouth daily 90 tablet 1    acetaminophen (TYLENOL) 500 mg tablet Take 1 tablet (500 mg total) by mouth every 6 (six) hours as needed for mild pain or moderate pain (Patient not taking: Reported on 7/7/2021) 40 tablet 0    albuterol (2 5 mg/3 mL) 0 083 % nebulizer solution Take 1 vial (2 5 mg total) by nebulization every 6 (six) hours as needed for wheezing or shortness of breath (Patient not taking: Reported on 7/7/2021) 120 vial 3    albuterol (PROVENTIL HFA,VENTOLIN HFA) 90 mcg/act inhaler Inhale 2 puffs every 6 (six) hours as needed for wheezing (Patient not taking: Reported on 7/7/2021) 3 Inhaler 0    magnesium hydroxide (MILK OF MAGNESIA) 400 mg/5 mL oral suspension Take 15 mL by mouth daily at bedtime (Patient not taking: Reported on 7/7/2021) 500 mL 1    mupirocin (BACTROBAN) 2 % ointment Apply topically 3 (three) times a day (Patient not taking: Reported on 7/7/2021) 22 g 0    psyllium (METAMUCIL,KONSYL) 30 9 % Take 7 5 g by mouth daily at bedtime (Patient not taking: Reported on 7/7/2021) 500 g 2     No current facility-administered medications for this visit  Current Outpatient Medications on File Prior to Visit   Medication Sig    Cetirizine HCl 10 MG CAPS Take by mouth daily     fluticasone (FLONASE) 50 mcg/act nasal spray 2 sprays into each nostril daily    fluticasone-salmeterol (Wixela Inhub) 250-50 mcg/dose inhaler Inhale 1 puff 2 (two) times a day Rinse mouth after use      ketoconazole (NIZORAL) 2 % shampoo Apply 1 application topically 2 (two) times a week    latanoprost (XALATAN) 0 005 % ophthalmic solution     lisinopril (ZESTRIL) 40 mg tablet Take 1 tablet (40 mg total) by mouth daily    metFORMIN (GLUCOPHAGE) 500 mg tablet Take 1 tablet (500 mg total) by mouth daily    acetaminophen (TYLENOL) 500 mg tablet Take 1 tablet (500 mg total) by mouth every 6 (six) hours as needed for mild pain or moderate pain (Patient not taking: Reported on 7/7/2021)    albuterol (2 5 mg/3 mL) 0 083 % nebulizer solution Take 1 vial (2 5 mg total) by nebulization every 6 (six) hours as needed for wheezing or shortness of breath (Patient not taking: Reported on 7/7/2021)    albuterol (PROVENTIL HFA,VENTOLIN HFA) 90 mcg/act inhaler Inhale 2 puffs every 6 (six) hours as needed for wheezing (Patient not taking: Reported on 7/7/2021)    magnesium hydroxide (MILK OF MAGNESIA) 400 mg/5 mL oral suspension Take 15 mL by mouth daily at bedtime (Patient not taking: Reported on 7/7/2021)    mupirocin (BACTROBAN) 2 % ointment Apply topically 3 (three) times a day (Patient not taking: Reported on 7/7/2021)    psyllium (METAMUCIL,KONSYL) 30 9 % Take 7 5 g by mouth daily at bedtime (Patient not taking: Reported on 7/7/2021)     No current facility-administered medications on file prior to visit  He is allergic to nyquil multi-symptom [pseudoeph-doxylamine-dm-apap] and penicillins       Review of Systems   Constitutional: Negative for appetite change, chills, fatigue and fever  HENT: Negative for sore throat and trouble swallowing  Eyes: Negative for redness  Respiratory: Negative for shortness of breath  Cardiovascular: Negative for chest pain and palpitations  Gastrointestinal: Negative for abdominal pain, constipation and diarrhea  Genitourinary: Negative for dysuria and hematuria  Musculoskeletal: Negative for back pain and neck pain  Skin: Negative for rash  Neurological: Negative for seizures, weakness and headaches  Hematological: Negative for adenopathy  Psychiatric/Behavioral: Negative for confusion  The patient is not nervous/anxious            Objective:      /86   Pulse 77   Temp 98 5 °F (36 9 °C)   Ht 5' 5" (1 651 m)   Wt 83 kg (183 lb)   BMI 30 45 kg/m²     Recent Results (from the past 1344 hour(s)) Fingerstick Glucose (POCT)    Collection Time: 05/28/21  8:35 AM   Result Value Ref Range    POC Glucose 129 65 - 140 mg/dl   Tissue Exam    Collection Time: 05/28/21 10:56 AM   Result Value Ref Range    Case Report       Surgical Pathology Report                         Case: [id number]                                   Authorizing Provider:  Joaquín Dawkins MD             Collected:           05/28/2021 1056              Ordering Location:     [address] [place] [address]      Received:            05/28/2021 [facility] Operating Room                                                      Pathologist:           Terry Mckeon MD                                                             Specimen:    Soft Tissue, Other, rhinophyma of the nose                                                 Final Diagnosis       A  Skin/soft Tissue, rhinophyma of the nose, resection:  - Consistent with rhinophyma  -- Sebaceous hyperplasia, dermal fibrosis, solar elastosis and dilated keratin filled follicles, many         with Demodex folliculorum and focal suppurative folliculitis, associated with mixed perifollicular acute         and chronic granulomatous inflammation including few eosinophils and plasma cells  - Special stains for fungus (PAS), acid fast bacilli (Kinyoun) and bacteria (B&H) negative except for     follicular bacterial and fungal colonization by gram positive cocci/coccobacilli and small budding yeast     consistent with Pityrosporum spp  Additional Information       All reported additional testing was performed with appropriately reactive controls    These tests were developed and their performance characteristics determined by [facility] or appropriate performing facility, though some tests may be performed on tissues which have not been validated for performance characteristics (such as staining performed on alcohol exposed cell blocks and decalcified tissues)  Results should be interpreted with caution and in the context of the patients clinical condition  These tests may not be cleared or approved by the [place]  Food and Drug Administration, though the FDA has determined that such clearance or approval is not necessary  These tests are used for clinical purposes and they should not be regarded as investigational or for research  This laboratory has been approved by [place] [id number], designated as a high-complexity laboratory and is qualified to perform these tests  Interpretation performed at [facility], [address] [place] [address]  Gross Description          A  The specimen is received in formalin, labeled with the patient's name and hospital number, and is designated "rhinophyma of nose  The specimen consists of multiple tan keratotic appearing fragments of skin measuring in aggregate 2 x 2 x 0 2 cm  Entirely submitted  Two cassettes  3 pieces in each cassette  Note: The estimated total formalin fixation time based upon information provided by the submitting clinician and the standard processing schedule is under 72 hours  MCrites          Clinical Information Resection of recurrent rhinophyma    Fingerstick Glucose (POCT)    Collection Time: 05/28/21 12:14 PM   Result Value Ref Range    POC Glucose 132 65 - 140 mg/dl        Physical Exam  Constitutional:       General: He is not in acute distress  Appearance: Normal appearance  HENT:      Head: Normocephalic and atraumatic  Nose: Nose normal       Mouth/Throat:      Mouth: Mucous membranes are moist    Eyes:      Extraocular Movements: Extraocular movements intact  Pupils: Pupils are equal, round, and reactive to light  Cardiovascular:      Rate and Rhythm: Normal rate and regular rhythm  Pulses: Normal pulses  Heart sounds: Normal heart sounds  No murmur heard  No friction rub  Pulmonary:      Effort: Pulmonary effort is normal  No respiratory distress  Breath sounds: Normal breath sounds  No wheezing  Abdominal:      General: Abdomen is flat  Bowel sounds are normal  There is no distension  Palpations: Abdomen is soft  There is no mass  Tenderness: There is no abdominal tenderness  There is no guarding  Musculoskeletal:         General: Normal range of motion  Cervical back: Normal range of motion  Skin:     General: Skin is warm  Neurological:      General: No focal deficit present  Mental Status: He is alert and oriented to person, place, and time  Mental status is at baseline  Cranial Nerves: No cranial nerve deficit     Psychiatric:         Mood and Affect: Mood normal          Behavior: Behavior normal

## 2021-07-14 NOTE — TELEPHONE ENCOUNTER
Upon review of the In Basket request and the patient's chart, initial outreach has been made via fax, please see Contacts section for details       Thank you  Kenia Tyson

## 2021-07-16 NOTE — TELEPHONE ENCOUNTER
As a follow-up, a second attempt has been made for outreach via fax, please see Contacts section for details      Thank you  Laura Torres General Adult HPI





- General


Chief complaint: Altered Mental Status


Stated complaint: Unconcious


Time Seen by Provider: 05/07/21 10:35


Source: patient, EMS, RN notes reviewed, old records reviewed


Mode of arrival: EMS


Limitations: no limitations





- History of Present Illness


Initial comments: 





This is a 76-year-old male who presents emergency Department because at the 

nursing home he was unresponsive and according to the report from EMS he was 

unresponsive since last evening.  Patient has had no respiratory distress 

patient was given Narcan on the way and it did not respond.  Patient's sugar was

101.  Patient had no fever according his patient has had no other problems no 

vomiting or diarrhea per the report there is no further history at this time 

secondary to the packet there is no family member caregiver with the patient.





- Related Data


                                Home Medications











 Medication  Instructions  Recorded  Confirmed


 


Famotidine [Pepcid] 20 mg PO DAILY@0800 04/09/18 05/07/21


 


Finasteride [Proscar] 5 mg PO HS 04/09/18 05/07/21


 


Furosemide [Lasix] 40 mg PO DAILY@0800 04/09/18 05/07/21


 


Warfarin [Coumadin] 2.5 mg PO DAILY@1600 11/15/18 05/07/21


 


QUEtiapine [SEROquel] 50 mg PO HS 03/31/21 05/07/21


 


rOPINIRole HCL [Requip] 0.25 mg PO HS 04/01/21 05/07/21


 


Levothyroxine Sodium 25 mcg PO HS 04/12/21 05/07/21


 


bisacodyL [Dulcolax] 5 mg PO DAILY PRN 04/12/21 05/07/21


 


Isosorbide Mononitrate ER [Imdur] 30 mg PO DAILY@0600 04/22/21 05/07/21


 


Multivitamins, Thera [Multivitamin 1 tab PO DAILY@0800 04/22/21 05/07/21





(formulary)]   


 


Spironolactone [Aldactone] 25 mg PO DAILY@0600 04/22/21 05/07/21


 


Healthshake 1 cap PO BID@0700,1730 05/07/21 05/07/21


 


Metoprolol Succinate (ER) [Toprol 50 mg PO BID@0800,2000 05/07/21 05/07/21





Xl]   


 


glipiZIDE [Glucotrol] 2.5 mg PO BID@0800,1600 05/07/21 05/07/21


 


traMADol HCl [Ultram] 50 mg PO Q6H PRN 05/07/21 05/07/21








                                  Previous Rx's











 Medication  Instructions  Recorded


 


lisinopriL [Zestril] 5 mg PO HS@2000 #30 tab 04/26/21











                                    Allergies











Allergy/AdvReac Type Severity Reaction Status Date / Time


 


No Known Allergies Allergy   Verified 05/07/21 10:54














Review of Systems


ROS Statement: 


Those systems with pertinent positive or pertinent negative responses have been 

documented in the HPI.





ROS Other: All systems not noted in ROS Statement are negative.





Past Medical History


Past Medical History: Diabetes Mellitus, Eye Disorder, GERD/Reflux, 

Hyperlipidemia, Hypertension, Memory Impairment, Myocardial Infarction (MI), 

Prostate Disorder, Thyroid Disorder


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 

WITH HALLUCINATIONS.  large BULLOUS DIABETICORUM  fluid sac rt leg 07/2014-burst

 and now resolved, SEE DR CHINCHILLA'S HISTORY AND PHYSICAL FOR CARDIAC HISTORY, 

CATARACT RIGHT EYE.  lewy body dementia with parkinsons recently increase upper 

body movement especially at night


Last Myocardial Infarction Date:: 1993


History of Any Multi-Drug Resistant Organisms: None Reported


Past Surgical History: AICD, Heart Catheterization With Stent, Pacemaker


Additional Past Surgical History / Comment(s): SEVERAL CARDIOVERSIONS.  LT 

CATARACT REMOVED


Past Anesthesia/Blood Transfusion Reactions: No Reported Reaction


Date of Last Stent Placement:: 1993


Type of Cardiac Device: Permanent Pacemaker, AICD


Device Placement Date:: 1994, 11/2010


Past Psychological History: Anxiety


Smoking Status: Former smoker


Past Alcohol Use History: Rare


Past Drug Use History: None Reported





- Past Family History


  ** Mother


Additional Family Medical History / Comment(s): SEVERE LEG EDEMA





General Exam





- General Exam Comments


Initial Comments: 





GENERAL:


Patient is well-developed and well-nourished.  Patient is nontoxic and well-

hydrated and is in mild distress.  Patient was unresponsive to verbal or painful

 stimuli





ENT:


Neck is soft and supple.  No significant lymphadenopathy is noted.  Oropharynx 

is clear.  Moist mucous membranes.  Neck has full range of motion without 

eliciting any pain.





EYES:


The sclera were anicteric and conjunctiva were pink and moist.  Extraocular 

movements were intact and pupils were equal round and reactive to light.  Eyeli

ds were unremarkable.





PULMONARY:


Unlabored respirations.  Good breath sounds bilaterally.  No audible rales 

rhonchi or wheezing was noted.





CARDIOVASCULAR:


There is a regular rate and rhythm without any murmurs gallops or rubs. 





ABDOMEN:


Soft and nontender with normal bowel sounds. 





SKIN:


Skin is clear with no lesions or rashes and otherwise unremarkable.





NEUROLOGIC:


Patient is alert and oriented 0.  





MUSCULOSKELETAL:


Normal extremities with adequate strength and full range of motion. 





LYMPHATICS:


No significant lymphadenopathy is noted





PSYCHIATRIC:


Unable to assess


Limitations: no limitations





Course


                                   Vital Signs











  05/07/21





  10:33


 


Temperature 98.8 F


 


Pulse Rate 82


 


Respiratory 18





Rate 


 


Blood Pressure 116/66


 


O2 Sat by Pulse 97





Oximetry 














Medical Decision Making





- Medical Decision Making





EKG shows a ventricular paced rhythm at 82 bpm GA interval 254 QRS is 436 QTC is

 509.  Patient's EKG shows no ST segment elevation 





CT of the brain shows no acute abnormality.





X-ray of the chest shows no acute abnormality.





I spoke with Dr. Borrego he agreed to admit the patient admitted the patient 

wrote admitting orders.





Shortly after the patient arrived patient woke up and was back to his baseline 

according to his wife.  Wife indicated patient may be faking some of his 

symptoms.





- Lab Data


Result diagrams: 


                                 05/07/21 10:45





                                 05/07/21 10:45


                                   Lab Results











  05/07/21 05/07/21 05/07/21 Range/Units





  10:45 10:45 10:45 


 


WBC  9.0    (3.8-10.6)  k/uL


 


RBC  3.60 L    (4.30-5.90)  m/uL


 


Hgb  11.8 L    (13.0-17.5)  gm/dL


 


Hct  38.6 L    (39.0-53.0)  %


 


MCV  107.2 H    (80.0-100.0)  fL


 


MCH  32.8    (25.0-35.0)  pg


 


MCHC  30.6 L    (31.0-37.0)  g/dL


 


RDW  16.2 H    (11.5-15.5)  %


 


Plt Count  240    (150-450)  k/uL


 


MPV  7.2    


 


Neutrophils %  75    %


 


Lymphocytes %  17    %


 


Monocytes %  5    %


 


Eosinophils %  1    %


 


Basophils %  0    %


 


Neutrophils #  6.7    (1.3-7.7)  k/uL


 


Lymphocytes #  1.5    (1.0-4.8)  k/uL


 


Monocytes #  0.4    (0-1.0)  k/uL


 


Eosinophils #  0.1    (0-0.7)  k/uL


 


Basophils #  0.0    (0-0.2)  k/uL


 


Manual Slide Review  Performed    


 


Hypochromasia  Slight    


 


Anisocytosis  Slight    


 


Macrocytosis  Marked A    


 


Ovalocytes  Present    


 


PT   17.2 H   (9.0-12.0)  sec


 


INR   1.7 H   (<1.2)  


 


APTT   26.2   (22.0-30.0)  sec


 


Sodium     (137-145)  mmol/L


 


Potassium     (3.5-5.1)  mmol/L


 


Chloride     ()  mmol/L


 


Carbon Dioxide     (22-30)  mmol/L


 


Anion Gap     mmol/L


 


BUN     (9-20)  mg/dL


 


Creatinine     (0.66-1.25)  mg/dL


 


Est GFR (CKD-EPI)AfAm     (>60 ml/min/1.73 sqM)  


 


Est GFR (CKD-EPI)NonAf     (>60 ml/min/1.73 sqM)  


 


Glucose     (74-99)  mg/dL


 


POC Glucose (mg/dL)     (75-99)  mg/dL


 


POC Glu Operater ID     


 


Calcium     (8.4-10.2)  mg/dL


 


Total Bilirubin     (0.2-1.3)  mg/dL


 


AST     (17-59)  U/L


 


ALT     (4-49)  U/L


 


Alkaline Phosphatase     ()  U/L


 


Ammonia     (<30)  umol/L


 


Troponin I     (0.000-0.034)  ng/mL


 


Total Protein     (6.3-8.2)  g/dL


 


Albumin     (3.5-5.0)  g/dL


 


Urine Color    Yellow  


 


Urine Appearance    Clear  (Clear)  


 


Urine pH    5.5  (5.0-8.0)  


 


Ur Specific Gravity    1.017  (1.001-1.035)  


 


Urine Protein    Trace H  (Negative)  


 


Urine Glucose (UA)    Negative  (Negative)  


 


Urine Ketones    Negative  (Negative)  


 


Urine Blood    Trace H  (Negative)  


 


Urine Nitrite    Negative  (Negative)  


 


Urine Bilirubin    Negative  (Negative)  


 


Urine Urobilinogen    4.0  (<2.0)  mg/dL


 


Ur Leukocyte Esterase    Negative  (Negative)  


 


Urine RBC    1  (0-5)  /hpf


 


Urine WBC    1  (0-5)  /hpf


 


Urine Bacteria    Rare H  (None)  /hpf


 


Hyaline Casts    17 H  (0-2)  /lpf


 


Urine Mucus    Rare H  (None)  /hpf


 


Urine Opiates Screen    Not Detected  (NotDetected)  


 


Ur Oxycodone Screen    Not Detected  (NotDetected)  


 


Urine Methadone Screen    Not Detected  (NotDetected)  


 


Ur Propoxyphene Screen    Not Detected  (NotDetected)  


 


Ur Barbiturates Screen    Not Detected  (NotDetected)  


 


U Tricyclic Antidepress    Detected H  (NotDetected)  


 


Ur Phencyclidine Scrn    Not Detected  (NotDetected)  


 


Ur Amphetamines Screen    Not Detected  (NotDetected)  


 


U Methamphetamines Scrn    Not Detected  (NotDetected)  


 


U Benzodiazepines Scrn    Not Detected  (NotDetected)  


 


Urine Cocaine Screen    Not Detected  (NotDetected)  


 


U Marijuana (THC) Screen    Not Detected  (NotDetected)  














  05/07/21 05/07/21 05/07/21 Range/Units





  10:45 10:45 10:45 


 


WBC     (3.8-10.6)  k/uL


 


RBC     (4.30-5.90)  m/uL


 


Hgb     (13.0-17.5)  gm/dL


 


Hct     (39.0-53.0)  %


 


MCV     (80.0-100.0)  fL


 


MCH     (25.0-35.0)  pg


 


MCHC     (31.0-37.0)  g/dL


 


RDW     (11.5-15.5)  %


 


Plt Count     (150-450)  k/uL


 


MPV     


 


Neutrophils %     %


 


Lymphocytes %     %


 


Monocytes %     %


 


Eosinophils %     %


 


Basophils %     %


 


Neutrophils #     (1.3-7.7)  k/uL


 


Lymphocytes #     (1.0-4.8)  k/uL


 


Monocytes #     (0-1.0)  k/uL


 


Eosinophils #     (0-0.7)  k/uL


 


Basophils #     (0-0.2)  k/uL


 


Manual Slide Review     


 


Hypochromasia     


 


Anisocytosis     


 


Macrocytosis     


 


Ovalocytes     


 


PT     (9.0-12.0)  sec


 


INR     (<1.2)  


 


APTT     (22.0-30.0)  sec


 


Sodium  148 H    (137-145)  mmol/L


 


Potassium  3.3 L    (3.5-5.1)  mmol/L


 


Chloride  105    ()  mmol/L


 


Carbon Dioxide  39 H    (22-30)  mmol/L


 


Anion Gap  4    mmol/L


 


BUN  34 H    (9-20)  mg/dL


 


Creatinine  1.25    (0.66-1.25)  mg/dL


 


Est GFR (CKD-EPI)AfAm  64    (>60 ml/min/1.73 sqM)  


 


Est GFR (CKD-EPI)NonAf  55    (>60 ml/min/1.73 sqM)  


 


Glucose  79    (74-99)  mg/dL


 


POC Glucose (mg/dL)     (75-99)  mg/dL


 


POC Glu Operater ID     


 


Calcium  8.2 L    (8.4-10.2)  mg/dL


 


Total Bilirubin  0.9    (0.2-1.3)  mg/dL


 


AST  21    (17-59)  U/L


 


ALT  9    (4-49)  U/L


 


Alkaline Phosphatase  90    ()  U/L


 


Ammonia   <9   (<30)  umol/L


 


Troponin I    0.031  (0.000-0.034)  ng/mL


 


Total Protein  5.5 L    (6.3-8.2)  g/dL


 


Albumin  2.8 L    (3.5-5.0)  g/dL


 


Urine Color     


 


Urine Appearance     (Clear)  


 


Urine pH     (5.0-8.0)  


 


Ur Specific Gravity     (1.001-1.035)  


 


Urine Protein     (Negative)  


 


Urine Glucose (UA)     (Negative)  


 


Urine Ketones     (Negative)  


 


Urine Blood     (Negative)  


 


Urine Nitrite     (Negative)  


 


Urine Bilirubin     (Negative)  


 


Urine Urobilinogen     (<2.0)  mg/dL


 


Ur Leukocyte Esterase     (Negative)  


 


Urine RBC     (0-5)  /hpf


 


Urine WBC     (0-5)  /hpf


 


Urine Bacteria     (None)  /hpf


 


Hyaline Casts     (0-2)  /lpf


 


Urine Mucus     (None)  /hpf


 


Urine Opiates Screen     (NotDetected)  


 


Ur Oxycodone Screen     (NotDetected)  


 


Urine Methadone Screen     (NotDetected)  


 


Ur Propoxyphene Screen     (NotDetected)  


 


Ur Barbiturates Screen     (NotDetected)  


 


U Tricyclic Antidepress     (NotDetected)  


 


Ur Phencyclidine Scrn     (NotDetected)  


 


Ur Amphetamines Screen     (NotDetected)  


 


U Methamphetamines Scrn     (NotDetected)  


 


U Benzodiazepines Scrn     (NotDetected)  


 


Urine Cocaine Screen     (NotDetected)  


 


U Marijuana (THC) Screen     (NotDetected)  














  05/07/21 Range/Units





  11:08 


 


WBC   (3.8-10.6)  k/uL


 


RBC   (4.30-5.90)  m/uL


 


Hgb   (13.0-17.5)  gm/dL


 


Hct   (39.0-53.0)  %


 


MCV   (80.0-100.0)  fL


 


MCH   (25.0-35.0)  pg


 


MCHC   (31.0-37.0)  g/dL


 


RDW   (11.5-15.5)  %


 


Plt Count   (150-450)  k/uL


 


MPV   


 


Neutrophils %   %


 


Lymphocytes %   %


 


Monocytes %   %


 


Eosinophils %   %


 


Basophils %   %


 


Neutrophils #   (1.3-7.7)  k/uL


 


Lymphocytes #   (1.0-4.8)  k/uL


 


Monocytes #   (0-1.0)  k/uL


 


Eosinophils #   (0-0.7)  k/uL


 


Basophils #   (0-0.2)  k/uL


 


Manual Slide Review   


 


Hypochromasia   


 


Anisocytosis   


 


Macrocytosis   


 


Ovalocytes   


 


PT   (9.0-12.0)  sec


 


INR   (<1.2)  


 


APTT   (22.0-30.0)  sec


 


Sodium   (137-145)  mmol/L


 


Potassium   (3.5-5.1)  mmol/L


 


Chloride   ()  mmol/L


 


Carbon Dioxide   (22-30)  mmol/L


 


Anion Gap   mmol/L


 


BUN   (9-20)  mg/dL


 


Creatinine   (0.66-1.25)  mg/dL


 


Est GFR (CKD-EPI)AfAm   (>60 ml/min/1.73 sqM)  


 


Est GFR (CKD-EPI)NonAf   (>60 ml/min/1.73 sqM)  


 


Glucose   (74-99)  mg/dL


 


POC Glucose (mg/dL)  75  (75-99)  mg/dL


 


POC Glu Operater ID  Jason Mackey  


 


Calcium   (8.4-10.2)  mg/dL


 


Total Bilirubin   (0.2-1.3)  mg/dL


 


AST   (17-59)  U/L


 


ALT   (4-49)  U/L


 


Alkaline Phosphatase   ()  U/L


 


Ammonia   (<30)  umol/L


 


Troponin I   (0.000-0.034)  ng/mL


 


Total Protein   (6.3-8.2)  g/dL


 


Albumin   (3.5-5.0)  g/dL


 


Urine Color   


 


Urine Appearance   (Clear)  


 


Urine pH   (5.0-8.0)  


 


Ur Specific Gravity   (1.001-1.035)  


 


Urine Protein   (Negative)  


 


Urine Glucose (UA)   (Negative)  


 


Urine Ketones   (Negative)  


 


Urine Blood   (Negative)  


 


Urine Nitrite   (Negative)  


 


Urine Bilirubin   (Negative)  


 


Urine Urobilinogen   (<2.0)  mg/dL


 


Ur Leukocyte Esterase   (Negative)  


 


Urine RBC   (0-5)  /hpf


 


Urine WBC   (0-5)  /hpf


 


Urine Bacteria   (None)  /hpf


 


Hyaline Casts   (0-2)  /lpf


 


Urine Mucus   (None)  /hpf


 


Urine Opiates Screen   (NotDetected)  


 


Ur Oxycodone Screen   (NotDetected)  


 


Urine Methadone Screen   (NotDetected)  


 


Ur Propoxyphene Screen   (NotDetected)  


 


Ur Barbiturates Screen   (NotDetected)  


 


U Tricyclic Antidepress   (NotDetected)  


 


Ur Phencyclidine Scrn   (NotDetected)  


 


Ur Amphetamines Screen   (NotDetected)  


 


U Methamphetamines Scrn   (NotDetected)  


 


U Benzodiazepines Scrn   (NotDetected)  


 


Urine Cocaine Screen   (NotDetected)  


 


U Marijuana (THC) Screen   (NotDetected)  














Disposition


Clinical Impression: 


 Altered mental status





Disposition: ADMITTED AS IP TO THIS [facility]


Referrals: 


Chuy Ramires DO [Primary Care Provider] - 1-2 days


Time of Disposition: 13:49

## 2021-07-21 NOTE — TELEPHONE ENCOUNTER
Upon review of the In Basket request we were able to locate, review, and update the patient chart as requested for CRC: Colonoscopy  Any additional questions or concerns should be emailed to the Practice Liaisons via [email or web address] email, please do not reply via In Basket      Thank you  Bryanna Ramirez

## 2021-09-02 ENCOUNTER — TELEPHONE (OUTPATIENT)
Dept: INTERNAL MEDICINE CLINIC | Facility: CLINIC | Age: 71
End: 2021-09-02

## 2021-09-02 PROCEDURE — 4010F ACE/ARB THERAPY RXD/TAKEN: CPT | Performed by: INTERNAL MEDICINE

## 2021-09-17 DIAGNOSIS — J45.40 MODERATE PERSISTENT ASTHMA WITHOUT COMPLICATION: ICD-10-CM

## 2021-09-17 NOTE — TELEPHONE ENCOUNTER
Refill Request       Wixela 250 mcg  Albuterol Sulfate 90 mg    Pharmacy: [id number]     LA: 7/7/21  NA: 10/12/21

## 2021-09-20 RX ORDER — ALBUTEROL SULFATE 90 UG/1
2 AEROSOL, METERED RESPIRATORY (INHALATION) EVERY 6 HOURS PRN
Qty: 54 G | Refills: 2 | Status: SHIPPED | OUTPATIENT
Start: 2021-09-20 | End: 2021-12-19

## 2021-10-12 ENCOUNTER — OFFICE VISIT (OUTPATIENT)
Dept: INTERNAL MEDICINE CLINIC | Facility: CLINIC | Age: 71
End: 2021-10-12
Payer: COMMERCIAL

## 2021-10-12 VITALS
BODY MASS INDEX: 31.25 KG/M2 | HEART RATE: 81 BPM | WEIGHT: 187.6 LBS | TEMPERATURE: 98.1 F | SYSTOLIC BLOOD PRESSURE: 120 MMHG | HEIGHT: 65 IN | OXYGEN SATURATION: 97 % | DIASTOLIC BLOOD PRESSURE: 80 MMHG

## 2021-10-12 DIAGNOSIS — Z00.00 MEDICARE ANNUAL WELLNESS VISIT, SUBSEQUENT: ICD-10-CM

## 2021-10-12 DIAGNOSIS — C67.9 MALIGNANT NEOPLASM OF URINARY BLADDER, UNSPECIFIED SITE (HCC): ICD-10-CM

## 2021-10-12 DIAGNOSIS — E55.9 VITAMIN D DEFICIENCY, UNSPECIFIED: ICD-10-CM

## 2021-10-12 DIAGNOSIS — Z79.4 TYPE 2 DIABETES MELLITUS WITHOUT COMPLICATION, WITH LONG-TERM CURRENT USE OF INSULIN (HCC): Primary | ICD-10-CM

## 2021-10-12 DIAGNOSIS — Z23 NEEDS FLU SHOT: ICD-10-CM

## 2021-10-12 DIAGNOSIS — E78.5 HYPERLIPIDEMIA, UNSPECIFIED HYPERLIPIDEMIA TYPE: ICD-10-CM

## 2021-10-12 DIAGNOSIS — L71.1 RHINOPHYMA: ICD-10-CM

## 2021-10-12 DIAGNOSIS — I10 HYPERTENSION, UNSPECIFIED TYPE: ICD-10-CM

## 2021-10-12 DIAGNOSIS — E11.9 TYPE 2 DIABETES MELLITUS WITHOUT COMPLICATION, WITH LONG-TERM CURRENT USE OF INSULIN (HCC): Primary | ICD-10-CM

## 2021-10-12 DIAGNOSIS — J45.40 MODERATE PERSISTENT ASTHMA WITHOUT COMPLICATION: ICD-10-CM

## 2021-10-12 PROCEDURE — G0439 PPPS, SUBSEQ VISIT: HCPCS | Performed by: INTERNAL MEDICINE

## 2021-10-12 PROCEDURE — 3008F BODY MASS INDEX DOCD: CPT | Performed by: INTERNAL MEDICINE

## 2021-10-12 PROCEDURE — 1170F FXNL STATUS ASSESSED: CPT | Performed by: INTERNAL MEDICINE

## 2021-10-12 PROCEDURE — G0008 ADMIN INFLUENZA VIRUS VAC: HCPCS

## 2021-10-12 PROCEDURE — 3074F SYST BP LT 130 MM HG: CPT | Performed by: INTERNAL MEDICINE

## 2021-10-12 PROCEDURE — 3288F FALL RISK ASSESSMENT DOCD: CPT | Performed by: INTERNAL MEDICINE

## 2021-10-12 PROCEDURE — 1160F RVW MEDS BY RX/DR IN RCRD: CPT | Performed by: INTERNAL MEDICINE

## 2021-10-12 PROCEDURE — 90662 IIV NO PRSV INCREASED AG IM: CPT

## 2021-10-12 PROCEDURE — 99214 OFFICE O/P EST MOD 30 MIN: CPT | Performed by: INTERNAL MEDICINE

## 2021-10-12 PROCEDURE — 1125F AMNT PAIN NOTED PAIN PRSNT: CPT | Performed by: INTERNAL MEDICINE

## 2021-10-12 PROCEDURE — 3725F SCREEN DEPRESSION PERFORMED: CPT | Performed by: INTERNAL MEDICINE

## 2021-10-12 PROCEDURE — 3079F DIAST BP 80-89 MM HG: CPT | Performed by: INTERNAL MEDICINE

## 2021-10-12 RX ORDER — PRAVASTATIN SODIUM 40 MG
40 TABLET ORAL DAILY
Qty: 90 TABLET | Refills: 1 | Status: SHIPPED | OUTPATIENT
Start: 2021-10-12 | End: 2022-01-18 | Stop reason: SDUPTHER

## 2021-10-12 RX ORDER — AMLODIPINE BESYLATE 5 MG/1
5 TABLET ORAL DAILY
Qty: 90 TABLET | Refills: 1 | Status: SHIPPED | OUTPATIENT
Start: 2021-10-12 | End: 2022-01-18 | Stop reason: SDUPTHER

## 2021-12-01 DIAGNOSIS — Z79.4 TYPE 2 DIABETES MELLITUS WITHOUT COMPLICATION, WITH LONG-TERM CURRENT USE OF INSULIN (HCC): ICD-10-CM

## 2021-12-01 DIAGNOSIS — E11.9 TYPE 2 DIABETES MELLITUS WITHOUT COMPLICATION, WITH LONG-TERM CURRENT USE OF INSULIN (HCC): ICD-10-CM

## 2022-01-18 ENCOUNTER — OFFICE VISIT (OUTPATIENT)
Dept: INTERNAL MEDICINE CLINIC | Facility: CLINIC | Age: 72
End: 2022-01-18
Payer: COMMERCIAL

## 2022-01-18 VITALS
HEIGHT: 65 IN | SYSTOLIC BLOOD PRESSURE: 124 MMHG | OXYGEN SATURATION: 97 % | HEART RATE: 75 BPM | TEMPERATURE: 98.2 F | BODY MASS INDEX: 30.66 KG/M2 | DIASTOLIC BLOOD PRESSURE: 82 MMHG | WEIGHT: 184 LBS

## 2022-01-18 DIAGNOSIS — E53.8 B12 DEFICIENCY: ICD-10-CM

## 2022-01-18 DIAGNOSIS — J45.40 MODERATE PERSISTENT ASTHMA WITHOUT COMPLICATION: ICD-10-CM

## 2022-01-18 DIAGNOSIS — E11.9 TYPE 2 DIABETES MELLITUS WITHOUT COMPLICATION, WITH LONG-TERM CURRENT USE OF INSULIN (HCC): Primary | ICD-10-CM

## 2022-01-18 DIAGNOSIS — E78.5 HYPERLIPIDEMIA, UNSPECIFIED HYPERLIPIDEMIA TYPE: ICD-10-CM

## 2022-01-18 DIAGNOSIS — Z79.4 TYPE 2 DIABETES MELLITUS WITHOUT COMPLICATION, WITH LONG-TERM CURRENT USE OF INSULIN (HCC): Primary | ICD-10-CM

## 2022-01-18 DIAGNOSIS — I10 HYPERTENSION, UNSPECIFIED TYPE: ICD-10-CM

## 2022-01-18 DIAGNOSIS — C67.9 MALIGNANT NEOPLASM OF URINARY BLADDER, UNSPECIFIED SITE (HCC): ICD-10-CM

## 2022-01-18 DIAGNOSIS — E55.9 VITAMIN D DEFICIENCY, UNSPECIFIED: ICD-10-CM

## 2022-01-18 PROCEDURE — 3725F SCREEN DEPRESSION PERFORMED: CPT | Performed by: INTERNAL MEDICINE

## 2022-01-18 PROCEDURE — 99214 OFFICE O/P EST MOD 30 MIN: CPT | Performed by: INTERNAL MEDICINE

## 2022-01-18 PROCEDURE — 1160F RVW MEDS BY RX/DR IN RCRD: CPT | Performed by: INTERNAL MEDICINE

## 2022-01-18 PROCEDURE — 3074F SYST BP LT 130 MM HG: CPT | Performed by: INTERNAL MEDICINE

## 2022-01-18 PROCEDURE — 3079F DIAST BP 80-89 MM HG: CPT | Performed by: INTERNAL MEDICINE

## 2022-01-18 PROCEDURE — 3008F BODY MASS INDEX DOCD: CPT | Performed by: INTERNAL MEDICINE

## 2022-01-18 RX ORDER — AMLODIPINE BESYLATE 5 MG/1
5 TABLET ORAL DAILY
Qty: 90 TABLET | Refills: 1 | Status: SHIPPED | OUTPATIENT
Start: 2022-01-18

## 2022-01-18 RX ORDER — PRAVASTATIN SODIUM 40 MG
40 TABLET ORAL DAILY
Qty: 90 TABLET | Refills: 1 | Status: SHIPPED | OUTPATIENT
Start: 2022-01-18

## 2022-01-18 NOTE — PROGRESS NOTES
Assessment/Plan:           1  Type 2 diabetes mellitus without complication, with long-term current use of insulin ([facility])  -     CBC and differential; Future  -     Comprehensive metabolic panel; Future; Expected date: 03/28/2022  -     Urinalysis with microscopic; Future; Expected date: 03/28/2022  -     Hemoglobin A1C; Future; Expected date: 03/28/2022  -     Microalbumin / creatinine urine ratio    2  Moderate persistent asthma without complication    3  Malignant neoplasm of urinary bladder, unspecified site ([email or web address] )    4  Hypertension, unspecified type  -     amLODIPine (NORVASC) 5 mg tablet; Take 1 tablet (5 mg total) by mouth daily    5  Vitamin D deficiency, unspecified    6  B12 deficiency  -     Vitamin B12; Future; Expected date: 03/28/2022    7  Hypertension, unspecified type  Comments:  Blood pressure stable continue lisinopril and amlodipine  Orders:  -     amLODIPine (NORVASC) 5 mg tablet; Take 1 tablet (5 mg total) by mouth daily    8  Hyperlipidemia, unspecified hyperlipidemia type  Comments:  Continue pravastatin 40 mg daily  Orders:  -     pravastatin (PRAVACHOL) 40 mg tablet; Take 1 tablet (40 mg total) by mouth daily           1  Type 2 diabetes mellitus without complication, with long-term current use of insulin ([email or web address] )      2  Moderate persistent asthma without complication      3  Malignant neoplasm of urinary bladder, unspecified site ([email or web address] )      4  Hypertension, unspecified type      5  Vitamin D deficiency, unspecified         No problem-specific Assessment & Plan notes found for this encounter  Subjective:      Patient ID: Lang Edgar is a 66 y o  male      HPI    The following portions of the patient's history were reviewed and updated as appropriate: He  has a past medical history of Asthma, [facility] ([facility] ), Cancer ([facility] ), Constipation, CPAP (continuous positive airway pressure) dependence, Diabetes mellitus ([facility] ), Diabetes mellitus due to underlying condition, controlled, without complication, without long-term current use of insulin ([place] [address] ), HTN (hypertension), Hyperlipidemia, Hyperlipidemia, Hypertension, Lung disease, Non-seasonal allergic rhinitis, Obstructive sleep apnea (adult) (pediatric), Other asthma, Rhinophyma, and Sleep apnea with use of continuous positive airway pressure (CPAP)  He   Patient Active Problem List    Diagnosis Date Noted    Sleep apnea with use of continuous positive airway pressure (CPAP)     HTN (hypertension)     Hyperlipidemia     Rhinophyma     Diabetes mellitus due to underlying condition, controlled, without complication, without long-term current use of insulin ([place] [address] )     Asthma     Bladder cancer ([place] [address] )     Sarcoid 10/10/2017     He  has a past surgical history that includes Appendectomy; Cystoscopy; pr repair of nasal septum (N/A, 12/11/2017); pr nasal/sinus ndsc w/total ethoidectomy (N/A, 12/11/2017); Bladder surgery; Prostate surgery; Colonoscopy (02/10/2016); CECECTOMY LAPAROSCOPIC; and pr repair of nasal septum (N/A, 5/28/2021)  His family history includes Coronary artery disease in his brother; Dementia in his sister; Diabetes in his father and mother; Heart disease in his father and mother; Hypertension in his family; Lung cancer in his brother  He  reports that he has never smoked  He has never used smokeless tobacco  He reports that he does not drink alcohol and does not use drugs    Current Outpatient Medications   Medication Sig Dispense Refill    albuterol (2 5 mg/3 mL) 0 083 % nebulizer solution Take 1 vial (2 5 mg total) by nebulization every 6 (six) hours as needed for wheezing or shortness of breath 120 vial 3    amLODIPine (NORVASC) 5 mg tablet Take 1 tablet (5 mg total) by mouth daily 90 tablet 1    Cetirizine HCl 10 MG CAPS Take by mouth daily       doxycycline (ADOXA) 100 MG tablet Take 1 tablet (100 mg total) by mouth daily 30 tablet 2    fluticasone (FLONASE) 50 mcg/act nasal spray 2 sprays into each nostril daily 3 Bottle 1    fluticasone-salmeterol ([employer] Inhub) 250-50 mcg/dose inhaler Inhale 1 puff 2 (two) times a day Rinse mouth after use  180 blister 1    ketoconazole (NIZORAL) 2 % shampoo Apply 1 application topically 2 (two) times a week 360 mL 1    latanoprost (XALATAN) 0 005 % ophthalmic solution       lisinopril (ZESTRIL) 40 mg tablet TAKE ONE TABLET BY MOUTH EVERY DAY 90 tablet 1    metFORMIN (GLUCOPHAGE) 500 mg tablet Take 1 tablet (500 mg total) by mouth daily 90 tablet 0    pravastatin (PRAVACHOL) 40 mg tablet Take 1 tablet (40 mg total) by mouth daily 90 tablet 1    acetaminophen (TYLENOL) 500 mg tablet Take 1 tablet (500 mg total) by mouth every 6 (six) hours as needed for mild pain or moderate pain (Patient not taking: Reported on 7/7/2021) 40 tablet 0    magnesium hydroxide (MILK OF MAGNESIA) 400 mg/5 mL oral suspension Take 15 mL by mouth daily at bedtime (Patient not taking: Reported on 7/7/2021) 500 mL 1    psyllium (METAMUCIL,KONSYL) 30 9 % Take 7 5 g by mouth daily at bedtime (Patient not taking: Reported on 7/7/2021) 500 g 2     No current facility-administered medications for this visit  Current Outpatient Medications on File Prior to Visit   Medication Sig    albuterol (2 5 mg/3 mL) 0 083 % nebulizer solution Take 1 vial (2 5 mg total) by nebulization every 6 (six) hours as needed for wheezing or shortness of breath    Cetirizine HCl 10 MG CAPS Take by mouth daily     doxycycline (ADOXA) 100 MG tablet Take 1 tablet (100 mg total) by mouth daily    fluticasone (FLONASE) 50 mcg/act nasal spray 2 sprays into each nostril daily    fluticasone-salmeterol (Wixela Inhub) 250-50 mcg/dose inhaler Inhale 1 puff 2 (two) times a day Rinse mouth after use      ketoconazole (NIZORAL) 2 % shampoo Apply 1 application topically 2 (two) times a week    latanoprost (XALATAN) 0 005 % ophthalmic solution     lisinopril (ZESTRIL) 40 mg tablet TAKE ONE TABLET BY MOUTH EVERY DAY    metFORMIN (GLUCOPHAGE) 500 mg tablet Take 1 tablet (500 mg total) by mouth daily    [DISCONTINUED] amLODIPine (NORVASC) 5 mg tablet Take 1 tablet (5 mg total) by mouth daily    [DISCONTINUED] pravastatin (PRAVACHOL) 40 mg tablet Take 1 tablet (40 mg total) by mouth daily    acetaminophen (TYLENOL) 500 mg tablet Take 1 tablet (500 mg total) by mouth every 6 (six) hours as needed for mild pain or moderate pain (Patient not taking: Reported on 7/7/2021)    magnesium hydroxide (MILK OF MAGNESIA) 400 mg/5 mL oral suspension Take 15 mL by mouth daily at bedtime (Patient not taking: Reported on 7/7/2021)    psyllium (METAMUCIL,KONSYL) 30 9 % Take 7 5 g by mouth daily at bedtime (Patient not taking: Reported on 7/7/2021)     No current facility-administered medications on file prior to visit  He is allergic to nyquil multi-symptom [pseudoeph-doxylamine-dm-apap] and penicillins       Review of Systems   Constitutional: Negative for appetite change, chills, fatigue and fever  HENT: Negative for sore throat and trouble swallowing  Eyes: Negative for redness  Respiratory: Negative for shortness of breath  Cardiovascular: Negative for chest pain and palpitations  Gastrointestinal: Negative for abdominal pain, constipation and diarrhea  Genitourinary: Negative for dysuria and hematuria  Musculoskeletal: Negative for back pain and neck pain  Skin: Negative for rash  Neurological: Negative for seizures, weakness and headaches  Hematological: Negative for adenopathy  Psychiatric/Behavioral: Negative for confusion  The patient is not nervous/anxious            Objective:      /82 (BP Location: Right arm, Patient Position: Sitting, Cuff Size: Standard)   Pulse 75   Temp 98 2 °F (36 8 °C) (Temporal)   Ht 5' 5" (1 651 m)   Wt 83 5 kg (184 lb)   SpO2 97%   BMI 30 62 kg/m²     Results Reviewed     None          Recent Results (from the past 1344 hour(s))   Hemoglobin A1C    Collection Time: 12/23/21 12:00 AM   Result Value Ref Range    Hemoglobin A1C 6 4    Protein / creatinine ratio, urine    Collection Time: 12/23/21 12:00 AM   Result Value Ref Range    PROTEIN UA 22 4     EXT Creatinine Urine 125 0     EXTERNAL Ur Prot/Creat Ratio 0 18    HEMOGLOBIN A1C    Collection Time: 12/23/21  9:20 AM   Result Value Ref Range    Hemoglobin A1C 6 4 (H) <5 7 %    eAG, EST AVG Glucose 137 <154 mg/dL        Physical Exam  Constitutional:       General: He is not in acute distress  Appearance: Normal appearance  HENT:      Head: Normocephalic and atraumatic  Nose: Nose normal       Mouth/Throat:      Mouth: Mucous membranes are moist    Eyes:      Extraocular Movements: Extraocular movements intact  Pupils: Pupils are equal, round, and reactive to light  Cardiovascular:      Rate and Rhythm: Normal rate and regular rhythm  Pulses: Normal pulses  Heart sounds: Normal heart sounds  No murmur heard  No friction rub  Pulmonary:      Effort: Pulmonary effort is normal  No respiratory distress  Breath sounds: Normal breath sounds  No wheezing  Abdominal:      General: Abdomen is flat  Bowel sounds are normal  There is no distension  Palpations: Abdomen is soft  There is no mass  Tenderness: There is no abdominal tenderness  There is no guarding  Musculoskeletal:         General: Normal range of motion  Cervical back: Normal range of motion and neck supple  Neurological:      General: No focal deficit present  Mental Status: He is alert and oriented to person, place, and time  Mental status is at baseline  Cranial Nerves: No cranial nerve deficit  Psychiatric:         Mood and Affect: Mood normal          Behavior: Behavior normal        BMI Counseling: Body mass index is 30 62 kg/m²  The BMI is above normal  Nutrition recommendations include reducing portion sizes, decreasing overall calorie intake and 3-5 servings of fruits/vegetables daily

## 2022-03-14 DIAGNOSIS — I10 HYPERTENSION, UNSPECIFIED TYPE: ICD-10-CM

## 2022-03-14 DIAGNOSIS — E11.9 TYPE 2 DIABETES MELLITUS WITHOUT COMPLICATION, WITH LONG-TERM CURRENT USE OF INSULIN (HCC): ICD-10-CM

## 2022-03-14 DIAGNOSIS — Z79.4 TYPE 2 DIABETES MELLITUS WITHOUT COMPLICATION, WITH LONG-TERM CURRENT USE OF INSULIN (HCC): ICD-10-CM

## 2022-03-14 RX ORDER — LISINOPRIL 40 MG/1
40 TABLET ORAL DAILY
Qty: 90 TABLET | Refills: 1 | Status: SHIPPED | OUTPATIENT
Start: 2022-03-14 | End: 2022-07-18 | Stop reason: SDUPTHER

## 2022-03-14 NOTE — TELEPHONE ENCOUNTER
Refill Request     Metformin 500 mg   Lisinopril 40 mg    LA: 1/18/22  NA: 4/26/22    Pharmacy: [facility]

## 2022-04-26 ENCOUNTER — OFFICE VISIT (OUTPATIENT)
Dept: INTERNAL MEDICINE CLINIC | Facility: CLINIC | Age: 72
End: 2022-04-26
Payer: COMMERCIAL

## 2022-04-26 VITALS
OXYGEN SATURATION: 97 % | SYSTOLIC BLOOD PRESSURE: 152 MMHG | WEIGHT: 187 LBS | BODY MASS INDEX: 31.16 KG/M2 | TEMPERATURE: 98.3 F | HEART RATE: 72 BPM | DIASTOLIC BLOOD PRESSURE: 84 MMHG | HEIGHT: 65 IN

## 2022-04-26 DIAGNOSIS — C67.9 MALIGNANT NEOPLASM OF URINARY BLADDER, UNSPECIFIED SITE (HCC): ICD-10-CM

## 2022-04-26 DIAGNOSIS — E11.9 TYPE 2 DIABETES MELLITUS WITHOUT COMPLICATION, WITH LONG-TERM CURRENT USE OF INSULIN (HCC): ICD-10-CM

## 2022-04-26 DIAGNOSIS — I10 PRIMARY HYPERTENSION: Primary | ICD-10-CM

## 2022-04-26 DIAGNOSIS — L21.9 SEBORRHEIC DERMATITIS: ICD-10-CM

## 2022-04-26 DIAGNOSIS — L21.0 DANDRUFF IN ADULT: ICD-10-CM

## 2022-04-26 DIAGNOSIS — Z79.4 TYPE 2 DIABETES MELLITUS WITHOUT COMPLICATION, WITH LONG-TERM CURRENT USE OF INSULIN (HCC): ICD-10-CM

## 2022-04-26 DIAGNOSIS — E78.5 HYPERLIPIDEMIA, UNSPECIFIED HYPERLIPIDEMIA TYPE: ICD-10-CM

## 2022-04-26 PROCEDURE — 99214 OFFICE O/P EST MOD 30 MIN: CPT | Performed by: INTERNAL MEDICINE

## 2022-04-26 NOTE — PROGRESS NOTES
Assessment/Plan:           1  Primary hypertension  Comments:  Continue lisinopril  2  Dandruff in adult  -     Ambulatory Referral to Dermatology; Future    3  Seborrheic dermatitis  -     Ambulatory Referral to Dermatology; Future    4  Type 2 diabetes mellitus without complication, with long-term current use of insulin ([facility] )  Comments:  Labs were reviewed  Continue metformin and diet control  Orders:  -     Comprehensive metabolic panel; Future  -     Hemoglobin A1C; Future    5  Malignant neoplasm of urinary bladder, unspecified site [facility])  Comments:  Status post surgical resection  6  Hyperlipidemia, unspecified hyperlipidemia type  Comments:  Continue pravastatin 40 mg daily  1  Dandruff in adult    - Ambulatory Referral to Dermatology; Future    2  Seborrheic dermatitis    - Ambulatory Referral to Dermatology; Future       No problem-specific Assessment & Plan notes found for this encounter  Subjective:      Patient ID: Padmini Estrada is a 66 y o  male  HPI    The following portions of the patient's history were reviewed and updated as appropriate: He  has a past medical history of Asthma, Bladder cancer ([facility] ), [facility] ([facility] ), Constipation, CPAP (continuous positive airway pressure) dependence, Diabetes mellitus ([facility] ), Diabetes mellitus due to underlying condition, controlled, without complication, without long-term current use of insulin ([facility] ), HTN (hypertension), Hyperlipidemia, Hyperlipidemia, Hypertension, Lung disease, Non-seasonal allergic rhinitis, Obstructive sleep apnea (adult) (pediatric), Other asthma, Rhinophyma, and Sleep apnea with use of continuous positive airway pressure (CPAP)    He   Patient Active Problem List    Diagnosis Date Noted    Sleep apnea with use of continuous positive airway pressure (CPAP)     HTN (hypertension)     Hyperlipidemia     Rhinophyma     Diabetes mellitus due to underlying condition, controlled, without complication, without long-term current use of insulin ([facility] )     Asthma     [facility] )     Sarcoid 10/10/2017     He  has a past surgical history that includes Appendectomy; Cystoscopy; pr repair of nasal septum (N/A, 12/11/2017); pr nasal/sinus ndsc w/total ethoidectomy (N/A, 12/11/2017); Bladder surgery; Prostate surgery; Colonoscopy (02/10/2016); CECECTOMY LAPAROSCOPIC; and pr repair of nasal septum (N/A, 5/28/2021)  His family history includes Coronary artery disease in his brother; Dementia in his sister; Diabetes in his father and mother; Heart disease in his father and mother; Hypertension in his family; Lung cancer in his brother  He  reports that he has never smoked  He has never used smokeless tobacco  He reports that he does not drink alcohol and does not use drugs  Current Outpatient Medications   Medication Sig Dispense Refill    acetaminophen (TYLENOL) 500 mg tablet Take 1 tablet (500 mg total) by mouth every 6 (six) hours as needed for mild pain or moderate pain 40 tablet 0    albuterol (2 5 mg/3 mL) 0 083 % nebulizer solution Take 1 vial (2 5 mg total) by nebulization every 6 (six) hours as needed for wheezing or shortness of breath 120 vial 3    amLODIPine (NORVASC) 5 mg tablet Take 1 tablet (5 mg total) by mouth daily 90 tablet 1    Cetirizine HCl 10 MG CAPS Take by mouth daily       doxycycline (ADOXA) 100 MG tablet Take 1 tablet PO daily x 30 days 30 tablet 0    fluticasone (FLONASE) 50 mcg/act nasal spray 2 sprays into each nostril 2 (two) times a day 48 g 3    fluticasone-salmeterol ([employer]ixela Inhub) 250-50 mcg/dose inhaler Inhale 1 puff 2 (two) times a day Rinse mouth after use   180 blister 1    ketoconazole (NIZORAL) 2 % shampoo Apply 1 application topically 2 (two) times a week 360 mL 1    latanoprost (XALATAN) 0 005 % ophthalmic solution       lisinopril (ZESTRIL) 40 mg tablet Take 1 tablet (40 mg total) by mouth daily 90 tablet 1    metFORMIN (GLUCOPHAGE) 500 mg tablet Take 1 tablet (500 mg total) by mouth daily 90 tablet 0    pravastatin (PRAVACHOL) 40 mg tablet Take 1 tablet (40 mg total) by mouth daily 90 tablet 1    magnesium hydroxide (MILK OF MAGNESIA) 400 mg/5 mL oral suspension Take 15 mL by mouth daily at bedtime (Patient not taking: Reported on 7/7/2021) 500 mL 1    psyllium (METAMUCIL,KONSYL) 30 9 % Take 7 5 g by mouth daily at bedtime (Patient not taking: Reported on 7/7/2021) 500 g 2     No current facility-administered medications for this visit  Current Outpatient Medications on File Prior to Visit   Medication Sig    acetaminophen (TYLENOL) 500 mg tablet Take 1 tablet (500 mg total) by mouth every 6 (six) hours as needed for mild pain or moderate pain    albuterol (2 5 mg/3 mL) 0 083 % nebulizer solution Take 1 vial (2 5 mg total) by nebulization every 6 (six) hours as needed for wheezing or shortness of breath    amLODIPine (NORVASC) 5 mg tablet Take 1 tablet (5 mg total) by mouth daily    Cetirizine HCl 10 MG CAPS Take by mouth daily     doxycycline (ADOXA) 100 MG tablet Take 1 tablet PO daily x 30 days    fluticasone (FLONASE) 50 mcg/act nasal spray 2 sprays into each nostril 2 (two) times a day    fluticasone-salmeterol (Wixela Inhub) 250-50 mcg/dose inhaler Inhale 1 puff 2 (two) times a day Rinse mouth after use      ketoconazole (NIZORAL) 2 % shampoo Apply 1 application topically 2 (two) times a week    latanoprost (XALATAN) 0 005 % ophthalmic solution     lisinopril (ZESTRIL) 40 mg tablet Take 1 tablet (40 mg total) by mouth daily    metFORMIN (GLUCOPHAGE) 500 mg tablet Take 1 tablet (500 mg total) by mouth daily    pravastatin (PRAVACHOL) 40 mg tablet Take 1 tablet (40 mg total) by mouth daily    magnesium hydroxide (MILK OF MAGNESIA) 400 mg/5 mL oral suspension Take 15 mL by mouth daily at bedtime (Patient not taking: Reported on 7/7/2021)    psyllium (METAMUCIL,KONSYL) 30 9 % Take 7 5 g by mouth daily at bedtime (Patient not taking: Reported on 7/7/2021) No current facility-administered medications on file prior to visit  He is allergic to nyquil multi-symptom [pseudoeph-doxylamine-dm-apap] and penicillins       Review of Systems   Constitutional: Negative for appetite change, chills, fatigue and fever  HENT: Negative for sore throat and trouble swallowing  Eyes: Negative for redness  Respiratory: Negative for shortness of breath  Cardiovascular: Negative for chest pain and palpitations  Gastrointestinal: Negative for abdominal pain, constipation and diarrhea  Genitourinary: Negative for dysuria and hematuria  Musculoskeletal: Negative for back pain and neck pain  Skin: Negative for rash  Neurological: Negative for seizures, weakness and headaches  Hematological: Negative for adenopathy  Psychiatric/Behavioral: Negative for confusion  The patient is not nervous/anxious  Objective:      /84 (BP Location: Right arm, Patient Position: Sitting, Cuff Size: Standard)   Pulse 72   Temp 98 3 °F (36 8 °C) (Tympanic)   Ht 5' 5" (1 651 m)   Wt 84 8 kg (187 lb)   SpO2 97%   BMI 31 12 kg/m²     Results Reviewed     None          Recent Results (from the past 1344 hour(s))   HEMOGLOBIN A1C    Collection Time: 03/28/22  9:44 AM   Result Value Ref Range    Hemoglobin A1C 6 4 (H) <5 7 %    eAG, EST AVG Glucose 137 <154 mg/dL        Physical Exam  Constitutional:       General: He is not in acute distress  Appearance: Normal appearance  HENT:      Head: Normocephalic and atraumatic  Nose: Nose normal       Mouth/Throat:      Mouth: Mucous membranes are moist    Eyes:      Extraocular Movements: Extraocular movements intact  Pupils: Pupils are equal, round, and reactive to light  Cardiovascular:      Rate and Rhythm: Normal rate and regular rhythm  Pulses: Normal pulses  Heart sounds: Normal heart sounds  No murmur heard  No friction rub     Pulmonary:      Effort: Pulmonary effort is normal  No respiratory distress  Breath sounds: Normal breath sounds  No wheezing  Abdominal:      General: Abdomen is flat  Bowel sounds are normal  There is no distension  Palpations: Abdomen is soft  There is no mass  Tenderness: There is no abdominal tenderness  There is no guarding  Musculoskeletal:         General: Normal range of motion  Neurological:      General: No focal deficit present  Mental Status: He is alert and oriented to person, place, and time  Mental status is at baseline  Cranial Nerves: No cranial nerve deficit     Psychiatric:         Mood and Affect: Mood normal          Behavior: Behavior normal

## 2022-05-12 ENCOUNTER — CONSULT (OUTPATIENT)
Dept: DERMATOLOGY | Facility: CLINIC | Age: 72
End: 2022-05-12
Payer: COMMERCIAL

## 2022-05-12 VITALS — HEIGHT: 65 IN | BODY MASS INDEX: 30.99 KG/M2 | TEMPERATURE: 98.3 F | WEIGHT: 186 LBS

## 2022-05-12 DIAGNOSIS — L21.0 DANDRUFF IN ADULT: ICD-10-CM

## 2022-05-12 DIAGNOSIS — L21.9 SEBORRHEIC DERMATITIS: ICD-10-CM

## 2022-05-12 PROCEDURE — 3008F BODY MASS INDEX DOCD: CPT | Performed by: DERMATOLOGY

## 2022-05-12 PROCEDURE — 99204 OFFICE O/P NEW MOD 45 MIN: CPT | Performed by: DERMATOLOGY

## 2022-05-12 RX ORDER — CICLOPIROX OLAMINE 7.7 MG/100ML
SUSPENSION TOPICAL
Qty: 60 ML | Refills: 0 | Status: SHIPPED | OUTPATIENT
Start: 2022-05-12 | End: 2022-07-26 | Stop reason: SDUPTHER

## 2022-05-12 NOTE — PATIENT INSTRUCTIONS
1  SEBORRHEIC DERMATITIS    Physical Exam:  Anatomic Location Affected:  Scalp, inside ears, chin and submental regions      Assessment and Plan:  Based on a thorough discussion of this condition and the management approach to it (including a comprehensive discussion of the known risks, side effects and potential benefits of treatment), the patient (family) agrees to implement the following specific plan:  Begin ciclopirox cream twice a day to face, neck and ears  Begin ciclopirox suspension twice a day to scalp      Seborrheic Dermatitis   Seborrheic dermatitis is a common, chronic or relapsing form of eczema/dermatitis that mainly affects the sebaceous, gland-rich regions of the scalp, face, and trunk  There are infantile and adult forms of seborrhoeic dermatitis  It is sometimes associated with psoriasis and, in that clinical scenario, may be referred to as "sebo-psoriasis "  Seborrheic dermatitis is also known as "seborrheic eczema "  Dandruff (also called "pityriasis capitis") is an uninflamed form of seborrhoeic dermatitis  Dandruff presents as bran-like scaly patches scattered within hair-bearing areas of the scalp  In an infant, this condition may be referred to as "cradle cap "  The cause of seborrheic dermatitis is not completely understood  It is associated with proliferation of various species of the skin commensal Malassezia, in its yeast (non-pathogenic) form  Its metabolites (such as the fatty acids oleic acid, malssezin, and indole-3-carbaldehyde) may cause an inflammatory reaction  Differences in skin barrier lipid content and function may account for individual presentations  Infantile Seborrheic Dermatitis  Infantile seborrheic dermatitis affects babies under the age of 1 months and usually resolves by 1012 months of age  Infantile seborrheic dermatitis causes "cradle cap" (diffuse, greasy scaling on scalp)   The rash may spread to affect armpit and groin folds (a type of "napkin dermatitis")  There may be associated salmon-pink colored patches that may flake or peel  The rash in this case is usually not especially itchy, so the baby often appears undisturbed by the rash, even when more generalized  Adult Seborrheic Dermatitis  Adult seborrheic dermatitis tends to begin in late adolescence; prevalence is greatest in young adults and in the elderly  It is more common in males than in females  The following factors are sometimes associated with severe adult seborrheic dermatitis:  Oily skin  Familial tendency to seborrhoeic dermatitis or a family history of psoriasis  Immunosuppression: organ transplant recipient, human immunodeficiency virus (HIV) infection and patients with lymphoma  Neurological and psychiatric diseases: Parkinson disease, tardive dyskinesia, depression, epilepsy, facial nerve palsy, spinal cord injury and congenital disorders such as Down syndrome  Treatment for psoriasis with psoralen and ultraviolet A (PUVA) therapy  Lack of sleep  Stressful events  In adults, seborrheic dermatitis may typically affect the scalp, face (creases around the nose, behind ears, within eyebrows) and upper trunk  Typical clinical features include: Winter flares, improving in summer following sun exposure  Minimal itch most of the time  Combination oily and dry mid-facial skin  Ill-defined localized scaly patches or diffuse scale in the scalp  Blepharitis; scaly red eyelid margins  [place]-pink, thin, scaly, and ill-defined plaques in skin folds on both sides of the face  Petal or ring-shaped flaky patches on hair-line and on anterior chest  Rash in armpits, under the breasts, in the groin folds and genital creases  Superficial folliculitis (inflamed hair follicles) on cheeks and upper trunk    Seborrheic dermatitis is diagnosed by its clinical appearance and behavior  Skin biopsy may be helpful but is rarely necessary to make this diagnosis

## 2022-05-12 NOTE — PROGRESS NOTES
[facility] Note     Patient Name: Iris Aguayo  Encounter Date: 5/12/2022     Have you been cared for by a [facility] Dermatologist in the last 3 years and, if so, which one? No    · Have you traveled outside of the [address] [place] [address] in the past 3 months or outside of the [facility] area in the last 2 weeks? No     May we call your Preferred Phone number to discuss your specific medical information? Yes     May we leave a detailed message that includes your specific medical information? Yes      Today's Chief Concerns:   Concern #1:  Rash on scalp, ears and face   Concern #2:      Past Medical History:  Have you personally ever had or currently have any of the following? · Skin cancer (such as Melanoma, Basal Cell Carcinoma, Squamous Cell Carcinoma? (If Yes, please provide more detail)- No  · Eczema: No  · Psoriasis: No  · HIV/AIDS: No  · Hepatitis B or C: No  · Tuberculosis: No  · Systemic Immunosuppression such as Diabetes, Biologic or Immunotherapy, Chemotherapy, Organ Transplantation, Bone Marrow Transplantation (If YES, please provide more detail): YES, Diabetic, well controlled  · Radiation Treatment (If YES, please provide more detail): No  · Any other major medical conditions/concerns? (If Yes, which types)- YES, Bladder cancer 5 years ago, "doing well"    Social History:     What is/was your primary occupation? Retired     What are your hobbies/past-times? Family History:  Have any of your "first degree relatives" (parent, brother, sister, or child) had any of the following       · Skin cancer such as Melanoma or Merkel Cell Carcinoma or Pancreatic Cancer? No  · Eczema, Asthma, Hay Fever or Seasonal Allergies: No  · Psoriasis or Psoriatic Arthritis: No  · Do any other medical conditions seem to run in your family? If Yes, what condition and which relatives?   No    Current Medications:   (please update all dermatological medications before printing patient's AVS!)      Current Outpatient Medications:     acetaminophen (TYLENOL) 500 mg tablet, Take 1 tablet (500 mg total) by mouth every 6 (six) hours as needed for mild pain or moderate pain, Disp: 40 tablet, Rfl: 0    albuterol (2 5 mg/3 mL) 0 083 % nebulizer solution, Take 1 vial (2 5 mg total) by nebulization every 6 (six) hours as needed for wheezing or shortness of breath, Disp: 120 vial, Rfl: 3    amLODIPine (NORVASC) 5 mg tablet, Take 1 tablet (5 mg total) by mouth daily, Disp: 90 tablet, Rfl: 1    Cetirizine HCl 10 MG CAPS, Take by mouth daily , Disp: , Rfl:     doxycycline (ADOXA) 100 MG tablet, Take 1 tablet PO daily x 30 days, Disp: 30 tablet, Rfl: 0    fluticasone (FLONASE) 50 mcg/act nasal spray, 2 sprays into each nostril 2 (two) times a day, Disp: 48 g, Rfl: 3    ketoconazole (NIZORAL) 2 % shampoo, Apply 1 application topically 2 (two) times a week, Disp: 360 mL, Rfl: 1    latanoprost (XALATAN) 0 005 % ophthalmic solution, , Disp: , Rfl:     lisinopril (ZESTRIL) 40 mg tablet, Take 1 tablet (40 mg total) by mouth daily, Disp: 90 tablet, Rfl: 1    metFORMIN (GLUCOPHAGE) 500 mg tablet, Take 1 tablet (500 mg total) by mouth daily, Disp: 90 tablet, Rfl: 0    pravastatin (PRAVACHOL) 40 mg tablet, Take 1 tablet (40 mg total) by mouth daily, Disp: 90 tablet, Rfl: 1    fluticasone-salmeterol (Wixela Inhub) 250-50 mcg/dose inhaler, Inhale 1 puff 2 (two) times a day Rinse mouth after use , Disp: 180 blister, Rfl: 1    magnesium hydroxide (MILK OF MAGNESIA) 400 mg/5 mL oral suspension, Take 15 mL by mouth daily at bedtime (Patient not taking: No sig reported), Disp: 500 mL, Rfl: 1    psyllium (METAMUCIL,KONSYL) 30 9 %, Take 7 5 g by mouth daily at bedtime (Patient not taking: No sig reported), Disp: 500 g, Rfl: 2      Review of Systems:  Have you recently had or currently have any of the following? If YES, what are you doing for the problem?     · Fever, chills or unintended weight loss: No  · Sudden loss or change in your vision: No  · Nausea, vomiting or blood in your stool: No  · Painful or swollen joints: No  · Wheezing or cough: No  · Changing mole or non-healing wound: No  · Nosebleeds: No  · Excessive sweating: No  · Easy or prolonged bleeding? No  · Over the last 2 weeks, how often have you been bothered by the following problems? · Taking little interest or pleasure in doing things: 1 - Not at All  · Feeling down, depressed, or hopeless: 1 - Not at All  · Rapid heartbeat with epinephrine:  No    · FEMALES ONLY:    · Are you pregnant or planning to become pregnant? N/A  · Are you currently or planning to be nursing or breast feeding? N/A    · Any known allergies? Allergies   Allergen Reactions    Nyquil Multi-Symptom [Pseudoeph-Doxylamine-Dm-Apap] Shortness Of Breath     Caused an asthma attack    Penicillins      Patch test positive in childhood  Rash   ·       Physical Exam:     Was a chaperone (Derm Clinical Assistant) present throughout the entire Physical Exam? Yes     Did the Dermatology Team specifically  the patient on the importance of a Full Skin Exam to be sure that nothing is missed clinically?  Yes}  o Did the patient ultimately request or accept a Full Skin Exam?  NO  o Did the patient specifically refuse to have the areas "under-the-underwear" examined by the Dermatologist? YES    CONSTITUTIONAL:   Vitals:    05/12/22 1120   Temp: 98 3 °F (36 8 °C)   TempSrc: Temporal   Weight: 84 4 kg (186 lb)   Height: 5' 5" (1 651 m)       PSYCH: Normal mood and affect  EYES: Normal conjunctiva  ENT: Normal lips and oral mucosa  CARDIOVASCULAR: No edema  RESPIRATORY: Normal respirations  HEME/LYMPH/IMMUNO:  No regional lymphadenopathy except as noted below in "ASSESSMENT AND PLAN BY DIAGNOSIS"    SKIN:  FULL ORGAN SYSTEM EXAM   Hair, Scalp, Ears, Face Normal except as noted below in Assessment                                            Assessment and Plan by Diagnosis:    History of Present Condition:     Duration:  How long has this been an issue for you?    o  Years   Location Affected:  Where on the body is this affecting you?    o  Scalp, ears and face   Quality:  Is there any bleeding, pain, itch, burning/irritation, or redness associated with the skin lesion?    o  Itchy, red   Severity:  Describe any bleeding, pain, itch, burning/irritation, or redness on a scale of 1 to 10 (with 10 being the worst)  o  10   Timing:  Does this condition seem to be there pretty constantly or do you notice it more at specific times throughout the day?    o  Intermittently   Context:  Have you ever noticed that this condition seems to be associated with specific activities you do?    o  Denies   Modifying Factors:    o Anything that seems to make the condition worse?    -  Denies  o What have you tried to do to make the condition better?    -  HC 2 5% lotion, ketoconazole shampoo   Associated Signs and Symptoms:  Does this skin lesion seem to be associated with any of the following:  o   Bleeds at times from scratching    1  SEBORRHEIC DERMATITIS    Physical Exam:   Anatomic Location Affected:  Scalp, inside ears, chin and submental regions   Morphological Description:  See photos                           Additional History of Present Condition:  Present for years   Patient is currently using ketoconazole 2 % shampoo once a week and Hydrocortisone 2 5 % lotion 1-2 times a week    Assessment and Plan:  Based on a thorough discussion of this condition and the management approach to it (including a comprehensive discussion of the known risks, side effects and potential benefits of treatment), the patient (family) agrees to implement the following specific plan:   Begin ciclopirox cream twice a day to face, neck and ears   Begin ciclopirox suspension twice a day to scalp      Seborrheic Dermatitis   Seborrheic dermatitis is a common, chronic or relapsing form of eczema/dermatitis that mainly affects the sebaceous, gland-rich regions of the scalp, face, and trunk  There are infantile and adult forms of seborrhoeic dermatitis  It is sometimes associated with psoriasis and, in that clinical scenario, may be referred to as "sebo-psoriasis "  Seborrheic dermatitis is also known as "seborrheic eczema "  Dandruff (also called "pityriasis capitis") is an uninflamed form of seborrhoeic dermatitis  Dandruff presents as bran-like scaly patches scattered within hair-bearing areas of the scalp  In an infant, this condition may be referred to as "cradle cap "  The cause of seborrheic dermatitis is not completely understood  It is associated with proliferation of various species of the skin commensal Malassezia, in its yeast (non-pathogenic) form  Its metabolites (such as the fatty acids oleic acid, malssezin, and indole-3-carbaldehyde) may cause an inflammatory reaction  Differences in skin barrier lipid content and function may account for individual presentations  Infantile Seborrheic Dermatitis  Infantile seborrheic dermatitis affects babies under the age of 1 months and usually resolves by 1012 months of age  Infantile seborrheic dermatitis causes "cradle cap" (diffuse, greasy scaling on scalp)  The rash may spread to affect armpit and groin folds (a type of "napkin dermatitis")  There may be associated salmon-pink colored patches that may flake or peel  The rash in this case is usually not especially itchy, so the baby often appears undisturbed by the rash, even when more generalized  Adult Seborrheic Dermatitis  Adult seborrheic dermatitis tends to begin in late adolescence; prevalence is greatest in young adults and in the elderly  It is more common in males than in females      The following factors are sometimes associated with severe adult seborrheic dermatitis:   Oily skin   Familial tendency to seborrhoeic dermatitis or a family history of psoriasis   Immunosuppression: organ transplant recipient, human immunodeficiency virus (HIV) infection and patients with lymphoma   Neurological and psychiatric diseases: Parkinson disease, tardive dyskinesia, depression, epilepsy, facial nerve palsy, spinal cord injury and congenital disorders such as Down syndrome   Treatment for psoriasis with psoralen and ultraviolet A (PUVA) therapy   Lack of sleep   Stressful events  In adults, seborrheic dermatitis may typically affect the scalp, face (creases around the nose, behind ears, within eyebrows) and upper trunk  Typical clinical features include:   Winter flares, improving in summer following sun exposure   Minimal itch most of the time   Combination oily and dry mid-facial skin   Ill-defined localized scaly patches or diffuse scale in the scalp   Blepharitis; scaly red eyelid margins   Clover-pink, thin, scaly, and ill-defined plaques in skin folds on both sides of the face   Petal or ring-shaped flaky patches on hair-line and on anterior chest   Rash in armpits, under the breasts, in the groin folds and genital creases   Superficial folliculitis (inflamed hair follicles) on cheeks and upper trunk    Seborrheic dermatitis is diagnosed by its clinical appearance and behavior  Skin biopsy may be helpful but is rarely necessary to make this diagnosis      Scribe Attestation    I,:  Leighann López MA am acting as a scribe while in the presence of the attending physician :       I,:  Chad Orosco MD personally performed the services described in this documentation    as scribed in my presence :

## 2022-05-18 DIAGNOSIS — L21.0 DANDRUFF IN ADULT: ICD-10-CM

## 2022-05-18 DIAGNOSIS — L21.9 SEBORRHEIC DERMATITIS: ICD-10-CM

## 2022-05-18 NOTE — TELEPHONE ENCOUNTER
Jordan Suggs from [facility] asking for script for Ciclopirox to be changed to 90 grams for 90 days

## 2022-06-28 LAB
LEFT EYE DIABETIC RETINOPATHY: NORMAL
RIGHT EYE DIABETIC RETINOPATHY: NORMAL

## 2022-06-28 PROCEDURE — 2023F DILAT RTA XM W/O RTNOPTHY: CPT | Performed by: DERMATOLOGY

## 2022-07-14 ENCOUNTER — VBI (OUTPATIENT)
Dept: ADMINISTRATIVE | Facility: OTHER | Age: 72
End: 2022-07-14

## 2022-07-18 DIAGNOSIS — E11.9 TYPE 2 DIABETES MELLITUS WITHOUT COMPLICATION, WITH LONG-TERM CURRENT USE OF INSULIN (HCC): ICD-10-CM

## 2022-07-18 DIAGNOSIS — Z79.4 TYPE 2 DIABETES MELLITUS WITHOUT COMPLICATION, WITH LONG-TERM CURRENT USE OF INSULIN (HCC): ICD-10-CM

## 2022-07-18 DIAGNOSIS — I10 HYPERTENSION, UNSPECIFIED TYPE: ICD-10-CM

## 2022-07-18 NOTE — TELEPHONE ENCOUNTER
Refill Request     Lisinopril   Metformin     [facility]     LA: 4/26/22  NA: 7/26/22

## 2022-07-19 LAB — HBA1C MFR BLD HPLC: 6.5 %

## 2022-07-21 PROCEDURE — 4010F ACE/ARB THERAPY RXD/TAKEN: CPT | Performed by: INTERNAL MEDICINE

## 2022-07-21 RX ORDER — LISINOPRIL 40 MG/1
40 TABLET ORAL DAILY
Qty: 90 TABLET | Refills: 1 | Status: SHIPPED | OUTPATIENT
Start: 2022-07-21

## 2022-07-26 ENCOUNTER — OFFICE VISIT (OUTPATIENT)
Dept: INTERNAL MEDICINE CLINIC | Facility: CLINIC | Age: 72
End: 2022-07-26
Payer: COMMERCIAL

## 2022-07-26 VITALS
TEMPERATURE: 97.5 F | WEIGHT: 189 LBS | SYSTOLIC BLOOD PRESSURE: 130 MMHG | OXYGEN SATURATION: 98 % | BODY MASS INDEX: 31.49 KG/M2 | DIASTOLIC BLOOD PRESSURE: 80 MMHG | HEIGHT: 65 IN | HEART RATE: 79 BPM

## 2022-07-26 DIAGNOSIS — E78.2 MIXED HYPERLIPIDEMIA: ICD-10-CM

## 2022-07-26 DIAGNOSIS — Z79.4 TYPE 2 DIABETES MELLITUS WITHOUT COMPLICATION, WITH LONG-TERM CURRENT USE OF INSULIN (HCC): ICD-10-CM

## 2022-07-26 DIAGNOSIS — E11.9 TYPE 2 DIABETES MELLITUS WITHOUT COMPLICATION, WITH LONG-TERM CURRENT USE OF INSULIN (HCC): ICD-10-CM

## 2022-07-26 DIAGNOSIS — L21.0 DANDRUFF IN ADULT: ICD-10-CM

## 2022-07-26 DIAGNOSIS — I10 PRIMARY HYPERTENSION: Primary | ICD-10-CM

## 2022-07-26 DIAGNOSIS — Z85.51 HISTORY OF BLADDER CANCER: ICD-10-CM

## 2022-07-26 DIAGNOSIS — E78.5 HYPERLIPIDEMIA, UNSPECIFIED HYPERLIPIDEMIA TYPE: ICD-10-CM

## 2022-07-26 DIAGNOSIS — L21.9 SEBORRHEIC DERMATITIS: ICD-10-CM

## 2022-07-26 DIAGNOSIS — R60.9 EDEMA, UNSPECIFIED TYPE: ICD-10-CM

## 2022-07-26 PROCEDURE — 99214 OFFICE O/P EST MOD 30 MIN: CPT | Performed by: INTERNAL MEDICINE

## 2022-07-26 PROCEDURE — 3725F SCREEN DEPRESSION PERFORMED: CPT | Performed by: INTERNAL MEDICINE

## 2022-07-26 PROCEDURE — 3288F FALL RISK ASSESSMENT DOCD: CPT | Performed by: INTERNAL MEDICINE

## 2022-07-26 PROCEDURE — 1101F PT FALLS ASSESS-DOCD LE1/YR: CPT | Performed by: INTERNAL MEDICINE

## 2022-07-26 PROCEDURE — 3075F SYST BP GE 130 - 139MM HG: CPT | Performed by: INTERNAL MEDICINE

## 2022-07-26 PROCEDURE — 1160F RVW MEDS BY RX/DR IN RCRD: CPT | Performed by: INTERNAL MEDICINE

## 2022-07-26 PROCEDURE — 3079F DIAST BP 80-89 MM HG: CPT | Performed by: INTERNAL MEDICINE

## 2022-07-26 RX ORDER — CICLOPIROX OLAMINE 7.7 MG/100ML
SUSPENSION TOPICAL
Qty: 60 ML | Refills: 0 | Status: SHIPPED | OUTPATIENT
Start: 2022-07-26

## 2022-07-26 RX ORDER — KETOCONAZOLE 20 MG/ML
1 SHAMPOO TOPICAL 2 TIMES WEEKLY
Qty: 360 ML | Refills: 1 | Status: SHIPPED | OUTPATIENT
Start: 2022-07-28

## 2022-07-26 NOTE — PROGRESS NOTES
Diabetic Foot Exam    Patient's shoes and socks removed  Right Foot/Ankle   Right Foot Inspection  Skin Exam: skin normal and skin intact  No dry skin, no warmth, no callus, no erythema, no maceration, no abnormal color, no pre-ulcer, no ulcer and no callus  Sensory   Monofilament testing: intact    Vascular  The right DP pulse is 1+  The right PT pulse is 1+  Left Foot/Ankle  Left Foot Inspection  Skin Exam: skin normal and skin intact  No dry skin, no warmth, no erythema, no maceration, normal color, no pre-ulcer, no ulcer and no callus  Sensory   Monofilament testing: intact    Vascular  The left DP pulse is 1+  The left PT pulse is 1+       Assign Risk Category  No deformity present  No loss of protective sensation  No weak pulses  Risk: 0

## 2022-09-09 ENCOUNTER — TELEPHONE (OUTPATIENT)
Dept: INTERNAL MEDICINE CLINIC | Facility: CLINIC | Age: 72
End: 2022-09-09

## 2022-09-09 DIAGNOSIS — J45.40 MODERATE PERSISTENT ASTHMA WITHOUT COMPLICATION: ICD-10-CM

## 2022-09-09 NOTE — TELEPHONE ENCOUNTER
Refill Request (asking for 90 day supply)     Albuterol Sulfate Inhalation Aerosol   Fluticasone - Salmeterol 250     Pharmacy: [facility]     LA: 7/26/22  NA: 10/25/22

## 2022-09-12 RX ORDER — FLUTICASONE PROPIONATE AND SALMETEROL 250; 50 UG/1; UG/1
1 POWDER RESPIRATORY (INHALATION) 2 TIMES DAILY
Qty: 180 BLISTER | Refills: 0 | Status: SHIPPED | OUTPATIENT
Start: 2022-09-12 | End: 2022-12-11

## 2022-09-12 RX ORDER — ALBUTEROL SULFATE 2.5 MG/3ML
2.5 SOLUTION RESPIRATORY (INHALATION) EVERY 6 HOURS PRN
Qty: 360 ML | Refills: 1 | Status: SHIPPED | OUTPATIENT
Start: 2022-09-12 | End: 2022-12-11

## 2022-09-15 DIAGNOSIS — I10 HYPERTENSION, UNSPECIFIED TYPE: ICD-10-CM

## 2022-09-15 DIAGNOSIS — E78.5 HYPERLIPIDEMIA, UNSPECIFIED HYPERLIPIDEMIA TYPE: ICD-10-CM

## 2022-09-15 RX ORDER — PRAVASTATIN SODIUM 40 MG
40 TABLET ORAL DAILY
Qty: 90 TABLET | Refills: 1 | Status: SHIPPED | OUTPATIENT
Start: 2022-09-15

## 2022-09-15 RX ORDER — AMLODIPINE BESYLATE 5 MG/1
5 TABLET ORAL DAILY
Qty: 90 TABLET | Refills: 1 | Status: SHIPPED | OUTPATIENT
Start: 2022-09-15

## 2022-09-15 NOTE — TELEPHONE ENCOUNTER
Refill Request (90 day supply)     Amlodipine 5 mg  Pravastatin 40 mg     Pharmacy: [facility]     LA: 7/26/22  NA: 10/25/22

## 2022-10-21 LAB — HBA1C MFR BLD HPLC: 6.4 %

## 2022-11-01 ENCOUNTER — OFFICE VISIT (OUTPATIENT)
Dept: INTERNAL MEDICINE CLINIC | Facility: CLINIC | Age: 72
End: 2022-11-01

## 2022-11-01 VITALS
WEIGHT: 187 LBS | HEIGHT: 65 IN | BODY MASS INDEX: 31.16 KG/M2 | DIASTOLIC BLOOD PRESSURE: 70 MMHG | HEART RATE: 73 BPM | SYSTOLIC BLOOD PRESSURE: 150 MMHG | TEMPERATURE: 98.9 F | OXYGEN SATURATION: 96 %

## 2022-11-01 DIAGNOSIS — I10 HYPERTENSION, UNSPECIFIED TYPE: ICD-10-CM

## 2022-11-01 DIAGNOSIS — Z79.4 TYPE 2 DIABETES MELLITUS WITHOUT COMPLICATION, WITH LONG-TERM CURRENT USE OF INSULIN (HCC): ICD-10-CM

## 2022-11-01 DIAGNOSIS — E11.9 TYPE 2 DIABETES MELLITUS WITHOUT COMPLICATION, WITH LONG-TERM CURRENT USE OF INSULIN (HCC): ICD-10-CM

## 2022-11-01 DIAGNOSIS — J45.40 MODERATE PERSISTENT ASTHMA WITHOUT COMPLICATION: Primary | ICD-10-CM

## 2022-11-01 DIAGNOSIS — Z00.00 MEDICARE ANNUAL WELLNESS VISIT, SUBSEQUENT: ICD-10-CM

## 2022-11-01 RX ORDER — LISINOPRIL 40 MG/1
40 TABLET ORAL DAILY
Qty: 90 TABLET | Refills: 1 | Status: SHIPPED | OUTPATIENT
Start: 2022-11-01

## 2022-11-01 RX ORDER — FLUTICASONE PROPIONATE AND SALMETEROL 250; 50 UG/1; UG/1
1 POWDER RESPIRATORY (INHALATION) 2 TIMES DAILY
Qty: 180 BLISTER | Refills: 0 | Status: CANCELLED | OUTPATIENT
Start: 2022-11-01 | End: 2023-01-30

## 2022-11-01 RX ORDER — CHLORHEXIDINE GLUCONATE 0.12 MG/ML
RINSE ORAL
COMMUNITY
Start: 2022-08-08

## 2022-11-01 RX ORDER — ALBUTEROL SULFATE 2.5 MG/3ML
2.5 SOLUTION RESPIRATORY (INHALATION) EVERY 6 HOURS PRN
Qty: 360 ML | Refills: 1 | Status: SHIPPED | OUTPATIENT
Start: 2022-11-01 | End: 2023-01-30

## 2022-11-01 NOTE — PROGRESS NOTES
Assessment and Plan:     Problem List Items Addressed This Visit        Respiratory    Asthma - Primary    Relevant Medications    albuterol (2 5 mg/3 mL) 0 083 % nebulizer solution       Cardiovascular and Mediastinum    HTN (hypertension)    Relevant Medications    lisinopril (ZESTRIL) 40 mg tablet      Other Visit Diagnoses     Type 2 diabetes mellitus without complication, with long-term current use of insulin (HCC)        Relevant Medications    metFORMIN (GLUCOPHAGE) 500 mg tablet    Other Relevant Orders    Hemoglobin A1C    Microalbumin / creatinine urine ratio    Comprehensive metabolic panel    CBC and differential    Urinalysis with microscopic    Medicare annual wellness visit, subsequent               Preventive health issues were discussed with patient, and age appropriate screening tests were ordered as noted in patient's After Visit Summary  Personalized health advice and appropriate referrals for health education or preventive services given if needed, as noted in patient's After Visit Summary  History of Present Illness:     Patient presents for a Medicare Wellness Visit    This is a 52-year-old gentleman with a history of diabetes mellitus asthma bladder cancer sarcoidosis hyperlipidemia rhinophyma sleep apnea and obesity  Currently he denies any chest pain shortness of breath or any fever or chills  Patient Care Team:  Radha Michael as PCP - [address] [place], [place] [id number] [facility] ([facility])  [facility]  Salina Georges MD (Urology)  Harris Torre MD (Otolaryngology)     Review of Systems:     Review of Systems   Constitutional: Negative for appetite change, chills, fatigue and fever  HENT: Negative for sore throat and trouble swallowing  Eyes: Negative for redness  Respiratory: Negative for shortness of breath  Cardiovascular: Negative for chest pain and palpitations  Gastrointestinal: Negative for abdominal pain, constipation and diarrhea  Genitourinary: Negative for dysuria and hematuria  Musculoskeletal: Negative for back pain and neck pain  Skin: Negative for rash  Neurological: Negative for seizures, weakness and headaches  Hematological: Negative for adenopathy  Psychiatric/Behavioral: Negative for confusion  The patient is not nervous/anxious           Problem List:     Patient Active Problem List   Diagnosis   • Diabetes mellitus due to underlying condition, controlled, without complication, without long-term current use of insulin (HCC)   • Asthma   • Sarcoid   • Bladder cancer ([place] [address] )   • Sleep apnea with use of continuous positive airway pressure (CPAP)   • HTN (hypertension)   • Hyperlipidemia   • Rhinophyma   • History of bladder cancer      Past Medical and Surgical History:     Past Medical History:   Diagnosis Date   • Asthma    • Bladder cancer ([place] [address] )    • Cancer ([place] [address] )     Bladder Cancer Hx   • Constipation    • CPAP (continuous positive airway pressure) dependence     stopped using   • Diabetes mellitus ([place] [address] )    • Diabetes mellitus due to underlying condition, controlled, without complication, without long-term current use of insulin ([place] [address] )    • HTN (hypertension)    • Hyperlipidemia    • Hyperlipidemia    • Hypertension    • Lung disease    • Non-seasonal allergic rhinitis    • Obstructive sleep apnea (adult) (pediatric)    • Other asthma    • Rhinophyma    • Sleep apnea with use of continuous positive airway pressure (CPAP)      Past Surgical History:   Procedure Laterality Date   • APPENDECTOMY     • BLADDER SURGERY      bladder cancer   • CECECTOMY LAPAROSCOPIC     • COLONOSCOPY  02/10/2016    Abnormal    • CYSTOSCOPY     • GA NASAL/SINUS NDSC W/TOTAL ETHOIDECTOMY N/A 12/11/2017    Procedure: ENDOSCOPIC SINUS SURGERY; EXCISION RHINOPHYMA;  Surgeon: Ailin Lawler MD;  Location:  MAIN OR;  Service: ENT   • GA REPAIR OF NASAL SEPTUM N/A 12/11/2017    Procedure: SEPTOPLASTY; TURBINOPLASTY;  Surgeon: Ailin Lawler MD;  [facility];  Service: ENT   • AK REPAIR OF NASAL SEPTUM N/A 5/28/2021    Procedure: RESECTION OF RHINOPHYMA RECURRENT EXCISION OF RHINOPHYMA;  Surgeon: Day Rosario MD;  Location: [facility];  Service: ENT   • PROSTATE SURGERY      prostate shaved       Family History:     Family History   Problem Relation Age of Onset   • Diabetes Mother    • Heart disease Mother    • Diabetes Father    • Heart disease Father    • Dementia Sister         in [facility]   • Coronary artery disease Brother    • Hypertension Family    • Lung cancer Brother       Social History:     Social History     Socioeconomic History   • Marital status: /Civil Union     Spouse name: None   • Number of children: None   • Years of education: None   • Highest education level: None   Occupational History   • Occupation: Retired -    Tobacco Use   • Smoking status: Never Smoker   • Smokeless tobacco: Never Used   Vaping Use   • Vaping Use: Never used   Substance and Sexual Activity   • Alcohol use: No   • Drug use: No   • Sexual activity: None   Other Topics Concern   • None   Social History Narrative    Exercise: No      Social Determinants of Health     Financial Resource Strain: Low Risk    • Difficulty of Paying Living Expenses: Not hard at all   Food Insecurity: Not on file   Transportation Needs: No Transportation Needs   • Lack of Transportation (Medical): No   • Lack of Transportation (Non-Medical):  No   Physical Activity: Not on file   Stress: Not on file   Social Connections: Not on file   Intimate Partner Violence: Not on file   Housing Stability: Not on file      Medications and Allergies:     Current Outpatient Medications   Medication Sig Dispense Refill   • acetaminophen (TYLENOL) 500 mg tablet Take 1 tablet (500 mg total) by mouth every 6 (six) hours as needed for mild pain or moderate pain 40 tablet 0   • albuterol (2 5 mg/3 mL) 0 083 % nebulizer solution Take 3 mL (2 5 mg total) by nebulization every 6 (six) hours as needed for wheezing or shortness of breath 360 mL 1   • amLODIPine (NORVASC) 5 mg tablet Take 1 tablet (5 mg total) by mouth daily 90 tablet 1   • Cetirizine HCl 10 MG CAPS Take by mouth daily PRN     • chlorhexidine (PERIDEX) 0 12 % solution RINSE W/15ML (1 CAP) FOR 30 SECS IN MORNING AND EVENING AFTER BRUSHING, THEN SPIT, STOCK SIZE 473ML     • ciclopirox (LOPROX) 0 77 % cream Apply topically twice a day to ears, chin and neck 30 g 2   • ciclopirox (LOPROX) 0 77 % SUSP Apply topically twice a day to scalp 60 mL 0   • fluticasone (FLONASE) 50 mcg/act nasal spray 2 sprays into each nostril 2 (two) times a day 48 g 3   • Fluticasone-Salmeterol (Advair) 250-50 mcg/dose inhaler Inhale 1 puff 2 (two) times a day Rinse mouth after use  180 blister 0   • ketoconazole (NIZORAL) 2 % shampoo Apply 1 application topically 2 (two) times a week 360 mL 1   • latanoprost (XALATAN) 0 005 % ophthalmic solution      • lisinopril (ZESTRIL) 40 mg tablet Take 1 tablet (40 mg total) by mouth daily 90 tablet 1   • metFORMIN (GLUCOPHAGE) 500 mg tablet Take 1 tablet (500 mg total) by mouth daily 90 tablet 0   • pravastatin (PRAVACHOL) 40 mg tablet Take 1 tablet (40 mg total) by mouth daily 90 tablet 1   • psyllium (METAMUCIL,KONSYL) 30 9 % Take 7 5 g by mouth daily at bedtime 500 g 2   • fluticasone (FLONASE) 50 mcg/act nasal spray 2 sprays into each nostril daily 16 g 5   • magnesium hydroxide (MILK OF MAGNESIA) 400 mg/5 mL oral suspension Take 15 mL by mouth daily at bedtime (Patient not taking: No sig reported) 500 mL 1     No current facility-administered medications for this visit       Allergies   Allergen Reactions   • Nyquil Multi-Symptom [Pseudoeph-Doxylamine-Dm-Apap] Shortness Of Breath     Caused an asthma attack   • Penicillins      Patch test positive in childhood  Rash      Immunizations:     Immunization History   Administered Date(s) Administered   • BCG 08/31/2015, 09/08/2015, 03/04/2016, 03/11/2016, 03/18/2016   • COVID-19 MODERNA VACC 0 5 ML IM 04/27/2021, 05/26/2021, 12/14/2021   • INFLUENZA 11/08/2013, 01/09/2015, 09/15/2020   • Influenza, Seasonal Vaccine, Quadrivalent, Adjuvanted,   5e 10/06/2022   • Influenza, high dose seasonal 0 7 mL 10/12/2021   • Influenza, seasonal, injectable 10/11/2012   • Influenza, seasonal, injectable, preservative free 09/18/2017   • Pneumococcal Conjugate 13-Valent 04/01/2012   • Pneumococcal Polysaccharide PPV23 04/09/2013, 05/16/2017   • Tdap 05/01/2013   • Tuberculin Skin Test-PPD Intradermal 11/19/2013   • Zoster Vaccine Recombinant 08/25/2020, 08/25/2020, 12/01/2020   • influenza, trivalent, adjuvanted 09/15/2020      Health Maintenance:         Topic Date Due   • Colorectal Cancer Screening  10/13/2025   • Hepatitis C Screening  Completed         Topic Date Due   • COVID-19 Vaccine (4 - Booster for Moderna series) 04/14/2022   • Influenza Vaccine (1) 09/01/2022      Medicare Screening Tests and Risk Assessments:     Catalina Grullon is here for his Subsequent Wellness visit  Health Risk Assessment:   Patient rates overall health as good  Patient feels that their physical health rating is same  Patient is satisfied with their life  Eyesight was rated as same  Hearing was rated as same  Patient feels that their emotional and mental health rating is same  Patients states they are never, rarely angry  Patient states they are sometimes unusually tired/fatigued  Pain experienced in the last 7 days has been some  Patient's pain rating has been 3/10  Patient states that he has experienced no weight loss or gain in last 6 months  Depression Screening:   PHQ-2 Score: 0      Fall Risk Screening: In the past year, patient has experienced: no history of falling in past year      Home Safety:  Patient does not have trouble with stairs inside or outside of their home  Patient has working smoke alarms and has working carbon monoxide detector  Home safety hazards include: none  Nutrition:   Current diet is Diabetic  Medications:   Patient is not currently taking any over-the-counter supplements  Patient is able to manage medications  Activities of Daily Living (ADLs)/Instrumental Activities of Daily Living (IADLs):   Walk and transfer into and out of bed and chair?: Yes  Dress and groom yourself?: Yes    Bathe or shower yourself?: Yes    Feed yourself? Yes  Do your laundry/housekeeping?: Yes  Manage your money, pay your bills and track your expenses?: Yes  Make your own meals?: Yes    Do your own shopping?: Yes    Previous Hospitalizations:   Any hospitalizations or ED visits within the last 12 months?: No      Advance Care Planning:   Living will: No    Advanced directive: No      PREVENTIVE SCREENINGS      Cardiovascular Screening:    General: Screening Not Indicated and History Lipid Disorder      Diabetes Screening:     General: Screening Not Indicated and History Diabetes      Colorectal Cancer Screening:     General: Screening Current      Abdominal Aortic Aneurysm (AAA) Screening:    Risk factors include: age between 73-69 yo        Lung Cancer Screening:     General: Screening Not Indicated      Hepatitis C Screening:    General: Screening Current    Screening, Brief Intervention, and Referral to Treatment (SBIRT)    Screening  Typical number of drinks in a day: 0  Typical number of drinks in a week: 0  Interpretation: Low risk drinking behavior  Single Item Drug Screening:  How often have you used an illegal drug (including marijuana) or a prescription medication for non-medical reasons in the past year? never    Single Item Drug Screen Score: 0  Interpretation: Negative screen for possible drug use disorder    Other Counseling Topics:   Car/seat belt/driving safety, skin self-exam, sunscreen and regular weightbearing exercise and calcium and vitamin D intake       No exam data present     Physical Exam:     /70 (BP Location: Left arm, Patient Position: Sitting, Cuff Size: Large)   Pulse 73   Temp 98 9 °F (37 2 °C) (Temporal)   Ht 5' 5" (1 651 m)   Wt 84 8 kg (187 lb)   SpO2 96% Comment: RA  BMI 31 12 kg/m²     Physical Exam  Vitals and nursing note reviewed  Constitutional:       Appearance: He is well-developed  HENT:      Head: Normocephalic and atraumatic  Mouth/Throat:      Mouth: Mucous membranes are moist    Eyes:      Conjunctiva/sclera: Conjunctivae normal    Cardiovascular:      Rate and Rhythm: Normal rate and regular rhythm  Heart sounds: No murmur heard  Pulmonary:      Effort: Pulmonary effort is normal  No respiratory distress  Breath sounds: Normal breath sounds  Abdominal:      General: Abdomen is flat  Palpations: Abdomen is soft  Tenderness: There is no abdominal tenderness  Musculoskeletal:         General: Normal range of motion  Cervical back: Neck supple  Skin:     General: Skin is warm and dry  Neurological:      General: No focal deficit present  Mental Status: He is alert            Gary Perry MD

## 2022-11-18 DIAGNOSIS — J45.40 MODERATE PERSISTENT ASTHMA WITHOUT COMPLICATION: ICD-10-CM

## 2022-11-18 RX ORDER — ALBUTEROL SULFATE 90 UG/1
2 AEROSOL, METERED RESPIRATORY (INHALATION) EVERY 6 HOURS PRN
Qty: 54 G | Refills: 2 | Status: SHIPPED | OUTPATIENT
Start: 2022-11-18 | End: 2023-02-16

## 2022-11-18 NOTE — TELEPHONE ENCOUNTER
Refill Request     Albuterol Inhaler     Pharmacy: [facility]     [place]: 11/1/22  [place]: 2/28/23

## 2023-01-10 DIAGNOSIS — E78.5 HYPERLIPIDEMIA, UNSPECIFIED HYPERLIPIDEMIA TYPE: ICD-10-CM

## 2023-01-10 DIAGNOSIS — I10 HYPERTENSION, UNSPECIFIED TYPE: ICD-10-CM

## 2023-01-10 RX ORDER — PRAVASTATIN SODIUM 40 MG
40 TABLET ORAL DAILY
Qty: 90 TABLET | Refills: 1 | Status: SHIPPED | OUTPATIENT
Start: 2023-01-10

## 2023-01-10 RX ORDER — AMLODIPINE BESYLATE 5 MG/1
5 TABLET ORAL DAILY
Qty: 90 TABLET | Refills: 1 | Status: SHIPPED | OUTPATIENT
Start: 2023-01-10

## 2023-01-10 NOTE — TELEPHONE ENCOUNTER
Refill request (90 day supply)     Amlodipine 5 mg  Pravastatin 40 mg    Pharmacy: [facility]     [place]: 11/1/22  [place]: 2/28/23

## 2023-02-21 LAB
CREAT ?TM UR-SCNC: 209 UMOL/L
EXT MICROALBUMIN URINE RANDOM: 3.1
HBA1C MFR BLD HPLC: 6.8 %
MICROALBUMIN/CREAT UR: 14.8 MG/G{CREAT}

## 2023-02-24 ENCOUNTER — RA CDI HCC (OUTPATIENT)
Dept: OTHER | Facility: HOSPITAL | Age: 73
End: 2023-02-24

## 2023-02-27 DIAGNOSIS — Z79.4 TYPE 2 DIABETES MELLITUS WITHOUT COMPLICATION, WITH LONG-TERM CURRENT USE OF INSULIN (HCC): ICD-10-CM

## 2023-02-27 DIAGNOSIS — I10 HYPERTENSION, UNSPECIFIED TYPE: ICD-10-CM

## 2023-02-27 DIAGNOSIS — E11.9 TYPE 2 DIABETES MELLITUS WITHOUT COMPLICATION, WITH LONG-TERM CURRENT USE OF INSULIN (HCC): ICD-10-CM

## 2023-02-27 RX ORDER — LISINOPRIL 40 MG/1
40 TABLET ORAL DAILY
Qty: 90 TABLET | Refills: 1 | Status: SHIPPED | OUTPATIENT
Start: 2023-02-27

## 2023-02-27 NOTE — TELEPHONE ENCOUNTER
Refill Request (Patient has an appointment tomorrow, but is asking for refills in case he is unable to make it to his appointment due to the inclement weather)    Lisinopril 40 mg   Metformin 500 mg     Pharmacy: [facility]: 11/1/22  [place]: 2/28/23

## 2023-02-28 ENCOUNTER — OFFICE VISIT (OUTPATIENT)
Dept: INTERNAL MEDICINE CLINIC | Facility: CLINIC | Age: 73
End: 2023-02-28

## 2023-02-28 VITALS
SYSTOLIC BLOOD PRESSURE: 134 MMHG | TEMPERATURE: 98.5 F | HEART RATE: 73 BPM | OXYGEN SATURATION: 96 % | DIASTOLIC BLOOD PRESSURE: 82 MMHG | HEIGHT: 65 IN | WEIGHT: 186 LBS | BODY MASS INDEX: 30.99 KG/M2

## 2023-02-28 DIAGNOSIS — I10 PRIMARY HYPERTENSION: ICD-10-CM

## 2023-02-28 DIAGNOSIS — Z00.00 MEDICARE ANNUAL WELLNESS VISIT, SUBSEQUENT: ICD-10-CM

## 2023-02-28 DIAGNOSIS — Z23 NEED FOR DIPHTHERIA-TETANUS-PERTUSSIS (TDAP) VACCINE: ICD-10-CM

## 2023-02-28 DIAGNOSIS — D86.9 SARCOID: ICD-10-CM

## 2023-02-28 DIAGNOSIS — Z85.51 HISTORY OF BLADDER CANCER: Primary | ICD-10-CM

## 2023-02-28 DIAGNOSIS — I10 HYPERTENSION, UNSPECIFIED TYPE: ICD-10-CM

## 2023-02-28 DIAGNOSIS — J45.40 MODERATE PERSISTENT ASTHMA WITHOUT COMPLICATION: ICD-10-CM

## 2023-02-28 DIAGNOSIS — E11.628 TYPE 2 DIABETES MELLITUS WITH OTHER SKIN COMPLICATIONS (HCC): ICD-10-CM

## 2023-02-28 NOTE — PROGRESS NOTES
Assessment and Plan:     Problem List Items Addressed This Visit        Endocrine    Type 2 diabetes mellitus with other skin complications ([address] )    Relevant Orders    CBC and differential    Comprehensive metabolic panel    Urinalysis with microscopic    Hemoglobin A1C       Respiratory    Asthma       Cardiovascular and Mediastinum    HTN (hypertension)       Other    Sarcoid    History of bladder cancer - Primary   Other Visit Diagnoses     Need for diphtheria-tetanus-pertussis (Tdap) vaccine        Tdap prescribed  Relevant Medications    tetanus-diphtheria-acellular pertussis (ADACEL) 5-2-15 5 LF-mcg/0 5 injection    Medicare annual wellness visit, subsequent               Preventive health issues were discussed with patient, and age appropriate screening tests were ordered as noted in patient's After Visit Summary  Personalized health advice and appropriate referrals for health education or preventive services given if needed, as noted in patient's After Visit Summary  History of Present Illness:     Patient presents for a Medicare Wellness Visit    HPI   Patient Care Team:  Hyacinth Skelton MD as PCP - General (Internal Medicine)  Ricco Deal MD as PCP - [address] [place] [address] (RTE)  Ricco Deal MD as PCP - [facility]CP-[facility] ([facility])  Becca Holden MD (Urology)  Matthew Bergman MD (Otolaryngology)     Review of Systems:     Review of Systems   Constitutional: Negative for appetite change, chills, fatigue and fever  HENT: Negative for sore throat and trouble swallowing  Eyes: Negative for redness  Respiratory: Negative for shortness of breath  Cardiovascular: Negative for chest pain and palpitations  Gastrointestinal: Negative for abdominal pain, constipation and diarrhea  Genitourinary: Negative for dysuria and hematuria  Musculoskeletal: Negative for back pain and neck pain  Skin: Negative for rash  Neurological: Negative for seizures, weakness and headaches  Hematological: Negative for adenopathy  Psychiatric/Behavioral: Negative for confusion  The patient is not nervous/anxious           Problem List:     Patient Active Problem List   Diagnosis   • Diabetes mellitus due to underlying condition, controlled, without complication, without long-term current use of insulin (HCC)   • Asthma   • Sarcoid   • Bladder cancer ([place] [address] )   • Sleep apnea with use of continuous positive airway pressure (CPAP)   • HTN (hypertension)   • Hyperlipidemia   • Rhinophyma   • History of bladder cancer   • Type 2 diabetes mellitus with other skin complications ([place] [address] )      Past Medical and Surgical History:     Past Medical History:   Diagnosis Date   • Asthma    • Bladder cancer ([place] [address] )    • Cancer ([place] [address] )     Bladder Cancer Hx   • Constipation    • CPAP (continuous positive airway pressure) dependence     stopped using   • Diabetes mellitus ([place] [address] )    • Diabetes mellitus due to underlying condition, controlled, without complication, without long-term current use of insulin ([place] [address] )    • HTN (hypertension)    • Hyperlipidemia    • Hyperlipidemia    • Hypertension    • Lung disease    • Non-seasonal allergic rhinitis    • Obstructive sleep apnea (adult) (pediatric)    • Other asthma    • Rhinophyma    • Sleep apnea with use of continuous positive airway pressure (CPAP)      Past Surgical History:   Procedure Laterality Date   • APPENDECTOMY     • BLADDER SURGERY      bladder cancer   • CECECTOMY LAPAROSCOPIC     • COLONOSCOPY  02/10/2016    Abnormal    • CYSTOSCOPY     • LA NASAL/SINUS NDSC W/TOTAL ETHOIDECTOMY N/A 12/11/2017    Procedure: ENDOSCOPIC SINUS SURGERY; EXCISION RHINOPHYMA;  Surgeon: Stas Gamble MD;  [facility];  Service: ENT   • LA SEPTOPLASTY/SUBMUCOUS RESECJ W/WO CARTILAGE GRF N/A 12/11/2017    Procedure: SEPTOPLASTY; TURBINOPLASTY;  Surgeon: Stas Gamble MD;  [facility];  Service: ENT   • LA SEPTOPLASTY/SUBMUCOUS RESECJ W/WO CARTILAGE GRF N/A 5/28/2021    Procedure: RESECTION OF RHINOPHYMA RECURRENT EXCISION OF RHINOPHYMA;  Surgeon: Keenan Christian MD;  Location: [facility];  Service: ENT   • PROSTATE SURGERY      prostate shaved       Family History:     Family History   Problem Relation Age of Onset   • Diabetes Mother    • Heart disease Mother    • Diabetes Father    • Heart disease Father    • Dementia Sister         in [facility]   • Coronary artery disease Brother    • Hypertension Family    • Lung cancer Brother       Social History:     Social History     Socioeconomic History   • Marital status: /Civil Union     Spouse name: None   • Number of children: None   • Years of education: None   • Highest education level: None   Occupational History   • Occupation: Retired -    Tobacco Use   • Smoking status: Never   • Smokeless tobacco: Never   Vaping Use   • Vaping Use: Never used   Substance and Sexual Activity   • Alcohol use: No   • Drug use: No   • Sexual activity: None   Other Topics Concern   • None   Social History Narrative    Exercise: No      Social Determinants of Health     Financial Resource Strain: Low Risk    • Difficulty of Paying Living Expenses: Not hard at all   Food Insecurity: Not on file   Transportation Needs: No Transportation Needs   • Lack of Transportation (Medical): No   • Lack of Transportation (Non-Medical): No   Physical Activity: Not on file   Stress: Not on file   Social Connections: Not on file   Intimate Partner Violence: Not on file   Housing Stability: Not on file      Medications and Allergies:     Current Outpatient Medications   Medication Sig Dispense Refill   • amLODIPine (NORVASC) 5 mg tablet Take 1 tablet (5 mg total) by mouth daily 90 tablet 1   • Cetirizine HCl 10 MG CAPS Take by mouth daily PRN     • fluticasone (FLONASE) 50 mcg/act nasal spray 2 sprays into each nostril daily 16 g 5   • Fluticasone-Salmeterol (Advair) 250-50 mcg/dose inhaler Inhale 1 puff 2 (two) times a day Rinse mouth after use   180 blister 0   • ketoconazole (NIZORAL) 2 % shampoo Apply 1 application topically 2 (two) times a week 360 mL 1   • latanoprost (XALATAN) 0 005 % ophthalmic solution      • lisinopril (ZESTRIL) 40 mg tablet Take 1 tablet (40 mg total) by mouth daily 90 tablet 1   • metFORMIN (GLUCOPHAGE) 500 mg tablet Take 1 tablet (500 mg total) by mouth daily 90 tablet 0   • pravastatin (PRAVACHOL) 40 mg tablet Take 1 tablet (40 mg total) by mouth daily 90 tablet 1   • psyllium (METAMUCIL,KONSYL) 30 9 % Take 7 5 g by mouth daily at bedtime 500 g 2   • tetanus-diphtheria-acellular pertussis (ADACEL) 5-2-15 5 LF-mcg/0 5 injection Inject 0 5 mL into a muscle once for 1 dose 0 5 mL 0   • acetaminophen (TYLENOL) 500 mg tablet Take 1 tablet (500 mg total) by mouth every 6 (six) hours as needed for mild pain or moderate pain 40 tablet 0   • chlorhexidine (PERIDEX) 0 12 % solution RINSE W/15ML (1 CAP) FOR 30 SECS IN MORNING AND EVENING AFTER BRUSHING, THEN SPIT, STOCK SIZE 473ML (Patient not taking: Reported on 2/28/2023)     • fluticasone (FLONASE) 50 mcg/act nasal spray 2 sprays into each nostril 2 (two) times a day (Patient not taking: Reported on 2/28/2023) 48 g 3   • magnesium hydroxide (MILK OF MAGNESIA) 400 mg/5 mL oral suspension Take 15 mL by mouth daily at bedtime (Patient not taking: Reported on 7/7/2021) 500 mL 1     No current facility-administered medications for this visit       Allergies   Allergen Reactions   • Nyquil Multi-Symptom [Pseudoeph-Doxylamine-Dm-Apap] Shortness Of Breath     Caused an asthma attack   • Penicillins      Patch test positive in childhood  Rash      Immunizations:     Immunization History   Administered Date(s) Administered   • BCG 08/31/2015, 09/08/2015, 03/04/2016, 03/11/2016, 03/18/2016   • COVID-19 MODERNA VACC 0 5 ML IM 04/27/2021, 05/26/2021, 12/14/2021   • COVID-19 Moderna Vac BIVALENT 12 Yr+ IM (BOOSTER ONLY) 0 5 ML 11/30/2022   • INFLUENZA 11/08/2013, 01/09/2015, 09/15/2020   • Influenza, Seasonal Vaccine, Quadrivalent, Adjuvanted,   5e 10/06/2022   • Influenza, high dose seasonal 0 7 mL 10/12/2021   • Influenza, seasonal, injectable 10/11/2012   • Influenza, seasonal, injectable, preservative free 09/18/2017   • Pneumococcal Conjugate 13-Valent 04/01/2012   • Pneumococcal Polysaccharide PPV23 04/09/2013, 05/16/2017   • Tdap 05/01/2013   • Tuberculin Skin Test-PPD Intradermal 11/19/2013   • Zoster Vaccine Recombinant 08/25/2020, 08/25/2020, 12/01/2020   • influenza, trivalent, adjuvanted 09/15/2020      Health Maintenance:         Topic Date Due   • Colorectal Cancer Screening  10/13/2025   • Hepatitis C Screening  Completed         Topic Date Due   • Influenza Vaccine (1) 09/01/2022   • COVID-19 Vaccine (4 - Booster for Moderna series) 01/25/2023      Medicare Screening Tests and Risk Assessments:     Jin Madrid is here for his Subsequent Wellness visit  Last Medicare Wellness visit information reviewed, patient interviewed and updates made to the record today  Health Risk Assessment:   Patient rates overall health as good  Patient feels that their physical health rating is same  Patient is satisfied with their life  Eyesight was rated as same  Hearing was rated as same  Patient feels that their emotional and mental health rating is same  Patients states they are sometimes angry  Patient states they are sometimes unusually tired/fatigued  Pain experienced in the last 7 days has been some  Patient's pain rating has been 5/10  Patient states that he has experienced no weight loss or gain in last 6 months  Fall Risk Screening: In the past year, patient has experienced: no history of falling in past year      Home Safety:  Patient does not have trouble with stairs inside or outside of their home  Patient has working smoke alarms and has working carbon monoxide detector  Home safety hazards include: none  Nutrition:   Current diet is Regular       Medications:   Patient is not currently taking any over-the-counter supplements  Patient is able to manage medications  Activities of Daily Living (ADLs)/Instrumental Activities of Daily Living (IADLs):   Walk and transfer into and out of bed and chair?: Yes  Dress and groom yourself?: Yes    Bathe or shower yourself?: Yes    Feed yourself? Yes  Do your laundry/housekeeping?: Yes  Manage your money, pay your bills and track your expenses?: Yes  Make your own meals?: Yes    Do your own shopping?: Yes    Previous Hospitalizations:   Any hospitalizations or ED visits within the last 12 months?: No      Advance Care Planning:   Living will: No    Durable POA for healthcare: No    Advanced directive: No      PREVENTIVE SCREENINGS      Cardiovascular Screening:    General: Screening Not Indicated and History Lipid Disorder      Diabetes Screening:     General: Screening Not Indicated and History Diabetes      Colorectal Cancer Screening:     General: Screening Current      Abdominal Aortic Aneurysm (AAA) Screening:    Risk factors include: age between 73-69 yo        Lung Cancer Screening:     General: Screening Not Indicated      Hepatitis C Screening:    General: Screening Current    Screening, Brief Intervention, and Referral to Treatment (SBIRT)    Screening  Typical number of drinks in a day: 0  Typical number of drinks in a week: 0  Interpretation: Low risk drinking behavior  No results found  Physical Exam:     /82 Comment: home  Pulse 73   Temp 98 5 °F (36 9 °C) (Temporal)   Ht 5' 5" (1 651 m)   Wt 84 4 kg (186 lb)   SpO2 96%   BMI 30 95 kg/m²     Physical Exam  Vitals and nursing note reviewed  Constitutional:       General: He is not in acute distress  Appearance: He is well-developed  HENT:      Head: Normocephalic and atraumatic  Eyes:      Conjunctiva/sclera: Conjunctivae normal    Cardiovascular:      Rate and Rhythm: Normal rate and regular rhythm  Heart sounds: No murmur heard    Pulmonary:      Effort: Pulmonary effort is normal  No respiratory distress  Breath sounds: Normal breath sounds  Abdominal:      Palpations: Abdomen is soft  Tenderness: There is no abdominal tenderness  Musculoskeletal:         General: No swelling  Cervical back: Neck supple  Skin:     General: Skin is warm and dry  Capillary Refill: Capillary refill takes less than 2 seconds  Neurological:      Mental Status: He is alert  Psychiatric:         Mood and Affect: Mood normal       BMI Counseling: Body mass index is 30 95 kg/m²  The BMI is above normal  Nutrition recommendations include reducing portion sizes      Hero Bass MD

## 2023-05-24 DIAGNOSIS — J45.40 MODERATE PERSISTENT ASTHMA WITHOUT COMPLICATION: ICD-10-CM

## 2023-05-24 RX ORDER — FLUTICASONE PROPIONATE AND SALMETEROL 250; 50 UG/1; UG/1
1 POWDER RESPIRATORY (INHALATION) 2 TIMES DAILY
Qty: 180 BLISTER | Refills: 1 | Status: SHIPPED | OUTPATIENT
Start: 2023-05-24 | End: 2023-11-20

## 2023-06-12 DIAGNOSIS — I10 HYPERTENSION, UNSPECIFIED TYPE: ICD-10-CM

## 2023-06-12 DIAGNOSIS — E11.9 TYPE 2 DIABETES MELLITUS WITHOUT COMPLICATION, WITH LONG-TERM CURRENT USE OF INSULIN (HCC): ICD-10-CM

## 2023-06-12 DIAGNOSIS — Z79.4 TYPE 2 DIABETES MELLITUS WITHOUT COMPLICATION, WITH LONG-TERM CURRENT USE OF INSULIN (HCC): ICD-10-CM

## 2023-06-12 RX ORDER — LISINOPRIL 40 MG/1
40 TABLET ORAL DAILY
Qty: 90 TABLET | Refills: 0 | Status: SHIPPED | OUTPATIENT
Start: 2023-06-12

## 2023-06-27 ENCOUNTER — RA CDI HCC (OUTPATIENT)
Dept: OTHER | Facility: HOSPITAL | Age: 73
End: 2023-06-27

## 2023-06-27 ENCOUNTER — OFFICE VISIT (OUTPATIENT)
Dept: INTERNAL MEDICINE CLINIC | Facility: CLINIC | Age: 73
End: 2023-06-27
Payer: COMMERCIAL

## 2023-06-27 VITALS
WEIGHT: 190 LBS | BODY MASS INDEX: 31.65 KG/M2 | DIASTOLIC BLOOD PRESSURE: 80 MMHG | HEIGHT: 65 IN | TEMPERATURE: 97.5 F | OXYGEN SATURATION: 95 % | HEART RATE: 67 BPM | SYSTOLIC BLOOD PRESSURE: 138 MMHG

## 2023-06-27 DIAGNOSIS — Z79.4 TYPE 2 DIABETES MELLITUS WITHOUT COMPLICATION, WITH LONG-TERM CURRENT USE OF INSULIN (HCC): ICD-10-CM

## 2023-06-27 DIAGNOSIS — C67.9 MALIGNANT NEOPLASM OF URINARY BLADDER, UNSPECIFIED SITE (HCC): ICD-10-CM

## 2023-06-27 DIAGNOSIS — E11.9 TYPE 2 DIABETES MELLITUS WITHOUT COMPLICATION, WITH LONG-TERM CURRENT USE OF INSULIN (HCC): ICD-10-CM

## 2023-06-27 DIAGNOSIS — L21.9 SEBORRHEIC DERMATITIS: ICD-10-CM

## 2023-06-27 DIAGNOSIS — I10 HYPERTENSION, UNSPECIFIED TYPE: Primary | ICD-10-CM

## 2023-06-27 DIAGNOSIS — J45.40 MODERATE PERSISTENT ASTHMA WITHOUT COMPLICATION: ICD-10-CM

## 2023-06-27 PROCEDURE — 99214 OFFICE O/P EST MOD 30 MIN: CPT | Performed by: INTERNAL MEDICINE

## 2023-06-27 RX ORDER — ITRACONAZOLE 100 MG/1
200 CAPSULE ORAL DAILY
Qty: 7 CAPSULE | Refills: 0 | Status: SHIPPED | OUTPATIENT
Start: 2023-06-27 | End: 2023-06-29 | Stop reason: SDUPTHER

## 2023-06-27 NOTE — PROGRESS NOTES
Assessment/Plan:           1  Hypertension, unspecified type  Comments:  Continue amlodipine and lisinopril  2  Type 2 diabetes mellitus without complication, with long-term current use of insulin ([facility])  Comments:  Stable continue diabetic diet and metformin  3  Moderate persistent asthma without complication    4  Seborrheic dermatitis  Comments:  Seborrheic dermatitis of the scalp itraconazole was prescribed  Orders:  -     itraconazole (SPORANOX) 100 mg capsule; Take 2 capsules (200 mg total) by mouth daily for 7 days    5  Malignant neoplasm of urinary bladder, unspecified site ([place] [address] )           1  Hypertension, unspecified type      2  Type 2 diabetes mellitus without complication, with long-term current use of insulin ([place] [address] )      3  Moderate persistent asthma without complication         No problem-specific Assessment & Plan notes found for this encounter  Subjective:      Patient ID: Tasha Holland is a 74 y o  male  HPI    The following portions of the patient's history were reviewed and updated as appropriate: He  has a past medical history of Asthma, Bladder cancer ([place] [address] ), Cancer ([place] [address] ), Constipation, CPAP (continuous positive airway pressure) dependence, Diabetes mellitus ([place] [address] ), Diabetes mellitus due to underlying condition, controlled, without complication, without long-term current use of insulin ([place] [address] ), HTN (hypertension), Hyperlipidemia, Hyperlipidemia, Hypertension, Lung disease, Non-seasonal allergic rhinitis, Obstructive sleep apnea (adult) (pediatric), Other asthma, Rhinophyma, and Sleep apnea with use of continuous positive airway pressure (CPAP)    He   Patient Active Problem List    Diagnosis Date Noted   • Type 2 diabetes mellitus with other skin complications ([place] [address] ) 15/74/8328   • History of bladder cancer 07/26/2022   • Sleep apnea with use of continuous positive airway pressure (CPAP)    • HTN (hypertension)    • Hyperlipidemia    • Rhinophyma    • Diabetes mellitus due to underlying condition, controlled, without complication, without long-term current use of insulin ([facility] )    • Asthma    • Bladder cancer [facility])    • Sarcoid 10/10/2017     He  has a past surgical history that includes Appendectomy; Cystoscopy; pr septoplasty/submucous resecj w/wo cartilage grf (N/A, 12/11/2017); pr nasal/sinus ndsc w/total ethoidectomy (N/A, 12/11/2017); Bladder surgery; Prostate surgery; Colonoscopy (02/10/2016); CECECTOMY LAPAROSCOPIC; and pr septoplasty/submucous resecj w/wo cartilage grf (N/A, 5/28/2021)  His family history includes Coronary artery disease in his brother; Dementia in his sister; Diabetes in his father and mother; Heart disease in his father and mother; Hypertension in his family; Lung cancer in his brother  He  reports that he has never smoked  He has never used smokeless tobacco  He reports that he does not drink alcohol and does not use drugs  Current Outpatient Medications   Medication Sig Dispense Refill   • acetaminophen (TYLENOL) 500 mg tablet Take 1 tablet (500 mg total) by mouth every 6 (six) hours as needed for mild pain or moderate pain 40 tablet 0   • amLODIPine (NORVASC) 5 mg tablet Take 1 tablet (5 mg total) by mouth daily 90 tablet 1   • Cetirizine HCl 10 MG CAPS Take by mouth daily PRN     • fluticasone (FLONASE) 50 mcg/act nasal spray 2 sprays into each nostril daily 16 g 5   • Fluticasone-Salmeterol (Advair) 250-50 mcg/dose inhaler Inhale 1 puff 2 (two) times a day Rinse mouth after use   180 blister 1   • itraconazole (SPORANOX) 100 mg capsule Take 2 capsules (200 mg total) by mouth daily for 7 days 7 capsule 0   • ketoconazole (NIZORAL) 2 % shampoo Apply 1 application topically 2 (two) times a week 360 mL 1   • latanoprost (XALATAN) 0 005 % ophthalmic solution      • lisinopril (ZESTRIL) 40 mg tablet Take 1 tablet (40 mg total) by mouth daily 90 tablet 0   • metFORMIN (GLUCOPHAGE) 500 mg tablet Take 1 tablet (500 mg total) by mouth daily 90 tablet 0   • pravastatin (PRAVACHOL) 40 mg tablet Take 1 tablet (40 mg total) by mouth daily 90 tablet 1   • psyllium (METAMUCIL,KONSYL) 30 9 % Take 7 5 g by mouth daily at bedtime 500 g 2   • chlorhexidine (PERIDEX) 0 12 % solution  (Patient not taking: Reported on 6/27/2023)     • fluticasone (FLONASE) 50 mcg/act nasal spray 2 sprays into each nostril 2 (two) times a day (Patient not taking: Reported on 6/27/2023) 48 g 3   • magnesium hydroxide (MILK OF MAGNESIA) 400 mg/5 mL oral suspension Take 15 mL by mouth daily at bedtime (Patient not taking: Reported on 6/27/2023) 500 mL 1     No current facility-administered medications for this visit  Current Outpatient Medications on File Prior to Visit   Medication Sig   • acetaminophen (TYLENOL) 500 mg tablet Take 1 tablet (500 mg total) by mouth every 6 (six) hours as needed for mild pain or moderate pain   • amLODIPine (NORVASC) 5 mg tablet Take 1 tablet (5 mg total) by mouth daily   • Cetirizine HCl 10 MG CAPS Take by mouth daily PRN   • fluticasone (FLONASE) 50 mcg/act nasal spray 2 sprays into each nostril daily   • Fluticasone-Salmeterol (Advair) 250-50 mcg/dose inhaler Inhale 1 puff 2 (two) times a day Rinse mouth after use     • ketoconazole (NIZORAL) 2 % shampoo Apply 1 application topically 2 (two) times a week   • latanoprost (XALATAN) 0 005 % ophthalmic solution    • lisinopril (ZESTRIL) 40 mg tablet Take 1 tablet (40 mg total) by mouth daily   • metFORMIN (GLUCOPHAGE) 500 mg tablet Take 1 tablet (500 mg total) by mouth daily   • pravastatin (PRAVACHOL) 40 mg tablet Take 1 tablet (40 mg total) by mouth daily   • psyllium (METAMUCIL,KONSYL) 30 9 % Take 7 5 g by mouth daily at bedtime   • chlorhexidine (PERIDEX) 0 12 % solution  (Patient not taking: Reported on 6/27/2023)   • fluticasone (FLONASE) 50 mcg/act nasal spray 2 sprays into each nostril 2 (two) times a day (Patient not taking: Reported on 6/27/2023)   • magnesium hydroxide (MILK OF MAGNESIA) 400 mg/5 mL oral "suspension Take 15 mL by mouth daily at bedtime (Patient not taking: Reported on 6/27/2023)     No current facility-administered medications on file prior to visit  He is allergic to nyquil multi-symptom [pseudoeph-doxylamine-dm-apap] and penicillins       Review of Systems   Constitutional: Negative for appetite change, chills, fatigue and fever  HENT: Negative for sore throat and trouble swallowing  Eyes: Negative for redness  Respiratory: Negative for shortness of breath  Cardiovascular: Negative for chest pain and palpitations  Gastrointestinal: Negative for abdominal pain, constipation and diarrhea  Genitourinary: Negative for dysuria and hematuria  Musculoskeletal: Negative for back pain and neck pain  Skin: Positive for rash  Neurological: Negative for seizures, weakness and headaches  Hematological: Negative for adenopathy  Psychiatric/Behavioral: Negative for confusion  The patient is not nervous/anxious  Objective:      /80   Pulse 67   Temp 97 5 °F (36 4 °C) (Temporal)   Ht 5' 5\" (1 651 m)   Wt 86 2 kg (190 lb)   SpO2 95%   BMI 31 62 kg/m²     Results Reviewed     None          Recent Results (from the past 1344 hour(s))   HEMOGLOBIN A1C    Collection Time: 06/17/23  9:03 AM   Result Value Ref Range    Hemoglobin A1C 7 0 (H) <5 7 %    eAG, EST AVG Glucose 154 mg/dL        Physical Exam  Constitutional:       General: He is not in acute distress  Appearance: Normal appearance  HENT:      Head: Normocephalic and atraumatic  Right Ear: Tympanic membrane normal       Left Ear: Tympanic membrane normal       Nose: Nose normal       Mouth/Throat:      Mouth: Mucous membranes are moist    Eyes:      Extraocular Movements: Extraocular movements intact  Pupils: Pupils are equal, round, and reactive to light  Cardiovascular:      Rate and Rhythm: Normal rate and regular rhythm  Pulses: Normal pulses  Heart sounds: Normal heart sounds   No " murmur heard  No friction rub  Pulmonary:      Effort: Pulmonary effort is normal  No respiratory distress  Breath sounds: Normal breath sounds  No wheezing  Abdominal:      General: Abdomen is flat  Bowel sounds are normal  There is no distension  Palpations: Abdomen is soft  There is no mass  Tenderness: There is no abdominal tenderness  There is no guarding  Musculoskeletal:         General: Normal range of motion  Cervical back: Normal range of motion  Skin:     General: Skin is warm  Findings: Rash present  Comments: Itchy rash of the scalp  Neurological:      General: No focal deficit present  Mental Status: He is alert and oriented to person, place, and time  Mental status is at baseline  Cranial Nerves: No cranial nerve deficit     Psychiatric:         Mood and Affect: Mood normal          Behavior: Behavior normal

## 2023-06-27 NOTE — PROGRESS NOTES
[facility]  coding opportunities      E11 [id number]     Chart Reviewed number of suggestions sent to Provider: 1   GR    Patients Insurance     Medicare Insurance: [facility] [address]

## 2023-06-29 ENCOUNTER — TELEPHONE (OUTPATIENT)
Dept: INTERNAL MEDICINE CLINIC | Facility: CLINIC | Age: 73
End: 2023-06-29

## 2023-06-29 DIAGNOSIS — L21.9 SEBORRHEIC DERMATITIS: ICD-10-CM

## 2023-06-29 RX ORDER — ITRACONAZOLE 100 MG/1
200 CAPSULE ORAL DAILY
Qty: 14 CAPSULE | Refills: 0 | Status: SHIPPED | OUTPATIENT
Start: 2023-06-29 | End: 2023-07-06

## 2023-06-29 NOTE — TELEPHONE ENCOUNTER
Patient came into the office to drop off immunization report that you had asked for  He was also wondering why the Itraconazole was not sent through to the pharmacy  I called [facility] and they need clarification on this medication  The directions state to take 2 capsules daily, but only a quantity of 7 pills were sent in   Please correct accordingly and resend to [facility]

## 2023-07-28 LAB
LEFT EYE DIABETIC RETINOPATHY: NORMAL
RIGHT EYE DIABETIC RETINOPATHY: NORMAL

## 2023-08-08 DIAGNOSIS — I10 HYPERTENSION, UNSPECIFIED TYPE: ICD-10-CM

## 2023-08-08 DIAGNOSIS — E78.5 HYPERLIPIDEMIA, UNSPECIFIED HYPERLIPIDEMIA TYPE: ICD-10-CM

## 2023-08-08 RX ORDER — AMLODIPINE BESYLATE 5 MG/1
5 TABLET ORAL DAILY
Qty: 90 TABLET | Refills: 1 | Status: SHIPPED | OUTPATIENT
Start: 2023-08-08

## 2023-08-08 RX ORDER — PRAVASTATIN SODIUM 40 MG
40 TABLET ORAL DAILY
Qty: 90 TABLET | Refills: 1 | Status: SHIPPED | OUTPATIENT
Start: 2023-08-08

## 2023-08-29 ENCOUNTER — OFFICE VISIT (OUTPATIENT)
Dept: INTERNAL MEDICINE CLINIC | Facility: CLINIC | Age: 73
End: 2023-08-29
Payer: COMMERCIAL

## 2023-08-29 VITALS
SYSTOLIC BLOOD PRESSURE: 136 MMHG | HEIGHT: 65 IN | BODY MASS INDEX: 31.16 KG/M2 | DIASTOLIC BLOOD PRESSURE: 69 MMHG | HEART RATE: 74 BPM | OXYGEN SATURATION: 96 % | TEMPERATURE: 98.2 F | WEIGHT: 187 LBS

## 2023-08-29 DIAGNOSIS — L21.9 SEBORRHEIC DERMATITIS: Primary | ICD-10-CM

## 2023-08-29 PROCEDURE — 99213 OFFICE O/P EST LOW 20 MIN: CPT | Performed by: INTERNAL MEDICINE

## 2023-08-29 RX ORDER — CICLOPIROX OLAMINE 7.7 MG/100ML
1 SUSPENSION TOPICAL 2 TIMES DAILY
Qty: 60 ML | Refills: 3 | Status: SHIPPED | OUTPATIENT
Start: 2023-08-29

## 2023-08-29 NOTE — PROGRESS NOTES
Assessment/Plan:           1. Seborrheic dermatitis  -     ciclopirox (LOPROX) 0.77 % SUSP; Apply 1 Application topically 2 (two) times a day  -     Ambulatory referral to clinical pharmacy; Future           1. Seborrheic dermatitis    - ciclopirox (LOPROX) 0.77 % SUSP; Apply 1 Application topically 2 (two) times a day  Dispense: 60 mL; Refill: 3  - Ambulatory referral to clinical pharmacy; Future       No problem-specific Assessment & Plan notes found for this encounter. Subjective:      Patient ID: Flako Storey is a 74 y.o. male. HPI    The following portions of the patient's history were reviewed and updated as appropriate: He  has a past medical history of Asthma, Bladder cancer ([address]), Cancer ([address]), Constipation, CPAP (continuous positive airway pressure) dependence, Diabetes mellitus ([address]), Diabetes mellitus due to underlying condition, controlled, without complication, without long-term current use of insulin ([address]), HTN (hypertension), Hyperlipidemia, Hyperlipidemia, Hypertension, Lung disease, Non-seasonal allergic rhinitis, Obstructive sleep apnea (adult) (pediatric), Other asthma, Rhinophyma, and Sleep apnea with use of continuous positive airway pressure (CPAP). He   Patient Active Problem List    Diagnosis Date Noted   • Type 2 diabetes mellitus with other skin complications ([address]) 33/85/7375   • History of bladder cancer 07/26/2022   • Sleep apnea with use of continuous positive airway pressure (CPAP)    • HTN (hypertension)    • Hyperlipidemia    • Rhinophyma    • Diabetes mellitus due to underlying condition, controlled, without complication, without long-term current use of insulin ([address])    • Asthma    • Bladder cancer [facility])    • Sarcoid 10/10/2017     He  has a past surgical history that includes Appendectomy; Cystoscopy; pr septoplasty/submucous resecj w/wo cartilage grf (N/A, 12/11/2017); pr nasal/sinus ndsc w/total ethoidectomy (N/A, 12/11/2017);  Bladder surgery; Prostate surgery; Colonoscopy (02/10/2016); CECECTOMY LAPAROSCOPIC; and pr septoplasty/submucous resecj w/wo cartilage grf (N/A, 5/28/2021). His family history includes Coronary artery disease in his brother; Dementia in his sister; Diabetes in his father and mother; Heart disease in his father and mother; Hypertension in his family; Lung cancer in his brother. He  reports that he has never smoked. He has never used smokeless tobacco. He reports that he does not drink alcohol and does not use drugs. Current Outpatient Medications   Medication Sig Dispense Refill   • acetaminophen (TYLENOL) 500 mg tablet Take 1 tablet (500 mg total) by mouth every 6 (six) hours as needed for mild pain or moderate pain 40 tablet 0   • amLODIPine (NORVASC) 5 mg tablet Take 1 tablet (5 mg total) by mouth daily 90 tablet 1   • ciclopirox (LOPROX) 0.77 % SUSP Apply 1 Application topically 2 (two) times a day 60 mL 3   • fluticasone (FLONASE) 50 mcg/act nasal spray 2 sprays into each nostril daily 16 g 5   • Fluticasone-Salmeterol (Advair) 250-50 mcg/dose inhaler Inhale 1 puff 2 (two) times a day Rinse mouth after use.  180 blister 1   • ketoconazole (NIZORAL) 2 % shampoo Apply 1 application topically 2 (two) times a week 360 mL 1   • latanoprost (XALATAN) 0.005 % ophthalmic solution      • lisinopril (ZESTRIL) 40 mg tablet Take 1 tablet (40 mg total) by mouth daily 90 tablet 0   • metFORMIN (GLUCOPHAGE) 500 mg tablet Take 1 tablet (500 mg total) by mouth daily 90 tablet 0   • pravastatin (PRAVACHOL) 40 mg tablet Take 1 tablet (40 mg total) by mouth daily 90 tablet 1   • psyllium (METAMUCIL,KONSYL) 30.9 % Take 7.5 g by mouth daily at bedtime 500 g 2   • Cetirizine HCl 10 MG CAPS Take by mouth daily PRN (Patient not taking: Reported on 8/29/2023)     • chlorhexidine (PERIDEX) 0.12 % solution  (Patient not taking: Reported on 6/27/2023)     • fluticasone (FLONASE) 50 mcg/act nasal spray 2 sprays into each nostril 2 (two) times a day (Patient not taking: Reported on 6/27/2023) 48 g 3   • magnesium hydroxide (MILK OF MAGNESIA) 400 mg/5 mL oral suspension Take 15 mL by mouth daily at bedtime (Patient not taking: Reported on 6/27/2023) 500 mL 1     No current facility-administered medications for this visit. Current Outpatient Medications on File Prior to Visit   Medication Sig   • acetaminophen (TYLENOL) 500 mg tablet Take 1 tablet (500 mg total) by mouth every 6 (six) hours as needed for mild pain or moderate pain   • amLODIPine (NORVASC) 5 mg tablet Take 1 tablet (5 mg total) by mouth daily   • fluticasone (FLONASE) 50 mcg/act nasal spray 2 sprays into each nostril daily   • Fluticasone-Salmeterol (Advair) 250-50 mcg/dose inhaler Inhale 1 puff 2 (two) times a day Rinse mouth after use. • ketoconazole (NIZORAL) 2 % shampoo Apply 1 application topically 2 (two) times a week   • latanoprost (XALATAN) 0.005 % ophthalmic solution    • lisinopril (ZESTRIL) 40 mg tablet Take 1 tablet (40 mg total) by mouth daily   • metFORMIN (GLUCOPHAGE) 500 mg tablet Take 1 tablet (500 mg total) by mouth daily   • pravastatin (PRAVACHOL) 40 mg tablet Take 1 tablet (40 mg total) by mouth daily   • psyllium (METAMUCIL,KONSYL) 30.9 % Take 7.5 g by mouth daily at bedtime   • Cetirizine HCl 10 MG CAPS Take by mouth daily PRN (Patient not taking: Reported on 8/29/2023)   • chlorhexidine (PERIDEX) 0.12 % solution  (Patient not taking: Reported on 6/27/2023)   • fluticasone (FLONASE) 50 mcg/act nasal spray 2 sprays into each nostril 2 (two) times a day (Patient not taking: Reported on 6/27/2023)   • magnesium hydroxide (MILK OF MAGNESIA) 400 mg/5 mL oral suspension Take 15 mL by mouth daily at bedtime (Patient not taking: Reported on 6/27/2023)     No current facility-administered medications on file prior to visit. There are no discontinued medications. He is allergic to nyquil multi-symptom [pseudoeph-doxylamine-dm-apap] and penicillins. .    Review of Systems Constitutional: Negative for fever. HENT: Negative for congestion. Gastrointestinal: Negative for abdominal pain. Skin: Positive for rash. Objective:      /69 (BP Location: Left arm, Patient Position: Sitting, Cuff Size: Adult)   Pulse 74   Temp 98.2 °F (36.8 °C) (Temporal)   Ht 5' 5" (1.651 m)   Wt 84.8 kg (187 lb)   SpO2 96%   BMI 31.12 kg/m²     Results Reviewed     None          Recent Results (from the past 1344 hour(s))    Diabetes Eye Exam    Collection Time: 07/28/23  1:26 PM   Result Value Ref Range    Right Eye Diabetic Retinopathy None     Left Eye Diabetic Retinopathy None         Physical Exam  HENT:      Mouth/Throat:      Mouth: Mucous membranes are moist.   Eyes:      Pupils: Pupils are equal, round, and reactive to light. Musculoskeletal:      Cervical back: Normal range of motion. Skin:     Findings: Rash present. Comments: The chronic dermatitis on the scalp has improved but is still persistent. Neurological:      General: No focal deficit present. Mental Status: He is alert.

## 2023-08-31 ENCOUNTER — TELEPHONE (OUTPATIENT)
Dept: INTERNAL MEDICINE CLINIC | Facility: CLINIC | Age: 73
End: 2023-08-31

## 2023-08-31 NOTE — TELEPHONE ENCOUNTER
[id number] Jorgensen, Pharmacist     Communication with patient: per telephone     Reason for documentation: PCP referral related to Medication Adherence for Diabetic Care     Findings:      Measure Outcome Adherence Score:   · Biguanides: 64%  · Sulfonylureas: N/A  · TZDs: N/A  · DPP-4: N/A  · GLP-1: N/A  · Meglitinides: N/A  · SGLT2:  N/A     Lab Results   Component Value Date    HGBA1C 7.0 (H) 06/17/2023    HGBA1C 6.8 (H) 02/21/2023      Patient was seen recently by PCP (August 29, 2023).    Discussion:      Contributors to medication non-adherence: none identified per patient    Guidance and services to reduce patient barriers: none per patient     Pharmacist Tracking    Reason For Outreach: [employer] Pharmacist  Demographics:  Intervention Method: Phone  Type of Intervention: New  Topics Addressed: Diabetes and Quality measures  Pharmacologic Interventions: Med Rec  Non-Pharmacologic Interventions: Adherence addressed and Care Gap  Time:  Direct Patient Care: 10 mins  Care Coordination: 10 mins  Recommendation Recipient: Patient/Caregiver  Outcome: Accepted

## 2023-09-05 DIAGNOSIS — I10 HYPERTENSION, UNSPECIFIED TYPE: ICD-10-CM

## 2023-09-05 DIAGNOSIS — E11.9 TYPE 2 DIABETES MELLITUS WITHOUT COMPLICATION, WITH LONG-TERM CURRENT USE OF INSULIN (HCC): ICD-10-CM

## 2023-09-05 DIAGNOSIS — Z79.4 TYPE 2 DIABETES MELLITUS WITHOUT COMPLICATION, WITH LONG-TERM CURRENT USE OF INSULIN (HCC): ICD-10-CM

## 2023-09-05 RX ORDER — LISINOPRIL 40 MG/1
40 TABLET ORAL DAILY
Qty: 90 TABLET | Refills: 0 | Status: SHIPPED | OUTPATIENT
Start: 2023-09-05

## 2023-09-05 NOTE — TELEPHONE ENCOUNTER
Refill Request     Lisinopril 40 mg tab   Metformin 500 mg tab     Pharm[facility]: [facility]     LA: 8/29/23  NA: 10/3/23

## 2023-09-25 ENCOUNTER — RA CDI HCC (OUTPATIENT)
Dept: OTHER | Facility: HOSPITAL | Age: 73
End: 2023-09-25

## 2023-09-25 NOTE — PROGRESS NOTES
[address] coding opportunities       Chart reviewed, no opportunity found: [address] Review     Patients Insurance     Medicare Insurance: [employer]

## 2023-09-27 ENCOUNTER — RA CDI HCC (OUTPATIENT)
Dept: OTHER | Facility: HOSPITAL | Age: 73
End: 2023-09-27

## 2023-09-28 NOTE — PROGRESS NOTES
[address] [facility] coding opportunities     I70.0, E11.36     Chart Reviewed number of suggestions sent to Provider: 2     GR    Patients Insurance     Medicare Insurance: [facility]

## 2023-10-03 ENCOUNTER — OFFICE VISIT (OUTPATIENT)
Dept: INTERNAL MEDICINE CLINIC | Facility: CLINIC | Age: 73
End: 2023-10-03
Payer: COMMERCIAL

## 2023-10-03 VITALS
SYSTOLIC BLOOD PRESSURE: 133 MMHG | HEIGHT: 65 IN | DIASTOLIC BLOOD PRESSURE: 75 MMHG | BODY MASS INDEX: 31.32 KG/M2 | HEART RATE: 74 BPM | OXYGEN SATURATION: 95 % | WEIGHT: 188 LBS | TEMPERATURE: 96.3 F

## 2023-10-03 DIAGNOSIS — I70.0 ATHEROSCLEROSIS OF AORTA (HCC): ICD-10-CM

## 2023-10-03 DIAGNOSIS — E11.9 TYPE 2 DIABETES MELLITUS WITHOUT COMPLICATION, WITH LONG-TERM CURRENT USE OF INSULIN (HCC): Primary | ICD-10-CM

## 2023-10-03 DIAGNOSIS — I10 PRIMARY HYPERTENSION: ICD-10-CM

## 2023-10-03 DIAGNOSIS — L21.9 SEBORRHEIC DERMATITIS: ICD-10-CM

## 2023-10-03 DIAGNOSIS — Z79.4 TYPE 2 DIABETES MELLITUS WITHOUT COMPLICATION, WITH LONG-TERM CURRENT USE OF INSULIN (HCC): Primary | ICD-10-CM

## 2023-10-03 DIAGNOSIS — I10 HYPERTENSION, UNSPECIFIED TYPE: ICD-10-CM

## 2023-10-03 DIAGNOSIS — E78.5 HYPERLIPIDEMIA, UNSPECIFIED HYPERLIPIDEMIA TYPE: ICD-10-CM

## 2023-10-03 LAB — SL AMB POCT HEMOGLOBIN AIC: 6.4 (ref ?–6.5)

## 2023-10-03 PROCEDURE — 99214 OFFICE O/P EST MOD 30 MIN: CPT | Performed by: INTERNAL MEDICINE

## 2023-10-03 PROCEDURE — 83036 HEMOGLOBIN GLYCOSYLATED A1C: CPT | Performed by: INTERNAL MEDICINE

## 2023-10-03 NOTE — PROGRESS NOTES
Assessment/Plan:           1. Type 2 diabetes mellitus without complication, with long-term current use of insulin (HCC)  Comments:  A1c is stable at 6.4% continue metformin. Orders:  -     CBC and differential; Future  -     Comprehensive metabolic panel; Future  -     Urinalysis with microscopic; Future  -     Albumin / creatinine urine ratio; Future  -     POCT hemoglobin A1c    2. Seborrheic dermatitis  Comments:  Doing better continue ciclopirox    3. Hypertension, unspecified type    4. Hyperlipidemia, unspecified hyperlipidemia type  Comments:  Stable continue pravastatin. Orders:  -     Lipid panel; Future    5. Atherosclerosis of aorta ([address])    6. Primary hypertension  Comments:  Continue amlodipine and lisinopril. 1. Type 2 diabetes mellitus without complication, with long-term current use of insulin (HCC)      2. Seborrheic dermatitis      3. Hypertension, unspecified type         No problem-specific Assessment & Plan notes found for this encounter. Subjective:      Patient ID: Seth Broussard is a 74 y.o. male. HPI    The following portions of the patient's history were reviewed and updated as appropriate: He  has a past medical history of Asthma, Bladder cancer ([address]), Cancer ([address]), Constipation, CPAP (continuous positive airway pressure) dependence, Diabetes mellitus ([address]), Diabetes mellitus due to underlying condition, controlled, without complication, without long-term current use of insulin ([address]), HTN (hypertension), Hyperlipidemia, Hyperlipidemia, Hypertension, Lung disease, Non-seasonal allergic rhinitis, Obstructive sleep apnea (adult) (pediatric), Other asthma, Rhinophyma, and Sleep apnea with use of continuous positive airway pressure (CPAP).   He   Patient Active Problem List    Diagnosis Date Noted   • Atherosclerosis of aorta ([address]) 10/03/2023   • Type 2 diabetes mellitus with other skin complications ([address]) 26/69/7438   • History of bladder cancer 07/26/2022   • Sleep apnea with use of continuous positive airway pressure (CPAP)    • HTN (hypertension)    • Hyperlipidemia    • Rhinophyma    • Diabetes mellitus due to underlying condition, controlled, without complication, without long-term current use of insulin (HCC)    • Asthma    • Bladder cancer [facility])    • Sarcoid 10/10/2017     He  has a past surgical history that includes Appendectomy; Cystoscopy; pr septoplasty/submucous resecj w/wo cartilage grf (N/A, 12/11/2017); pr nasal/sinus ndsc w/total ethoidectomy (N/A, 12/11/2017); Bladder surgery; Prostate surgery; Colonoscopy (02/10/2016); CECECTOMY LAPAROSCOPIC; and pr septoplasty/submucous resecj w/wo cartilage grf (N/A, 5/28/2021). His family history includes Coronary artery disease in his brother; Dementia in his sister; Diabetes in his father and mother; Heart disease in his father and mother; Hypertension in his family; Lung cancer in his brother. He  reports that he has never smoked. He has never used smokeless tobacco. He reports that he does not drink alcohol and does not use drugs. Current Outpatient Medications   Medication Sig Dispense Refill   • acetaminophen (TYLENOL) 500 mg tablet Take 1 tablet (500 mg total) by mouth every 6 (six) hours as needed for mild pain or moderate pain 40 tablet 0   • amLODIPine (NORVASC) 5 mg tablet Take 1 tablet (5 mg total) by mouth daily 90 tablet 1   • ciclopirox (LOPROX) 0.77 % SUSP Apply 1 Application topically 2 (two) times a day 60 mL 3   • fluticasone (FLONASE) 50 mcg/act nasal spray 2 sprays into each nostril daily 16 g 5   • Fluticasone-Salmeterol (Advair) 250-50 mcg/dose inhaler Inhale 1 puff 2 (two) times a day Rinse mouth after use.  180 blister 1   • latanoprost (XALATAN) 0.005 % ophthalmic solution      • lisinopril (ZESTRIL) 40 mg tablet Take 1 tablet (40 mg total) by mouth daily 90 tablet 0   • metFORMIN (GLUCOPHAGE) 500 mg tablet Take 1 tablet (500 mg total) by mouth daily 90 tablet 0   • pravastatin (PRAVACHOL) 40 mg tablet Take 1 tablet (40 mg total) by mouth daily 90 tablet 1   • psyllium (METAMUCIL,KONSYL) 30.9 % Take 7.5 g by mouth daily at bedtime 500 g 2   • chlorhexidine (PERIDEX) 0.12 % solution  (Patient not taking: Reported on 6/27/2023)     • magnesium hydroxide (MILK OF MAGNESIA) 400 mg/5 mL oral suspension Take 15 mL by mouth daily at bedtime (Patient not taking: Reported on 6/27/2023) 500 mL 1     No current facility-administered medications for this visit. Current Outpatient Medications on File Prior to Visit   Medication Sig   • acetaminophen (TYLENOL) 500 mg tablet Take 1 tablet (500 mg total) by mouth every 6 (six) hours as needed for mild pain or moderate pain   • amLODIPine (NORVASC) 5 mg tablet Take 1 tablet (5 mg total) by mouth daily   • ciclopirox (LOPROX) 0.77 % SUSP Apply 1 Application topically 2 (two) times a day   • fluticasone (FLONASE) 50 mcg/act nasal spray 2 sprays into each nostril daily   • Fluticasone-Salmeterol (Advair) 250-50 mcg/dose inhaler Inhale 1 puff 2 (two) times a day Rinse mouth after use.    • latanoprost (XALATAN) 0.005 % ophthalmic solution    • lisinopril (ZESTRIL) 40 mg tablet Take 1 tablet (40 mg total) by mouth daily   • metFORMIN (GLUCOPHAGE) 500 mg tablet Take 1 tablet (500 mg total) by mouth daily   • pravastatin (PRAVACHOL) 40 mg tablet Take 1 tablet (40 mg total) by mouth daily   • psyllium (METAMUCIL,KONSYL) 30.9 % Take 7.5 g by mouth daily at bedtime   • [DISCONTINUED] ketoconazole (NIZORAL) 2 % shampoo Apply 1 application topically 2 (two) times a week   • chlorhexidine (PERIDEX) 0.12 % solution  (Patient not taking: Reported on 6/27/2023)   • magnesium hydroxide (MILK OF MAGNESIA) 400 mg/5 mL oral suspension Take 15 mL by mouth daily at bedtime (Patient not taking: Reported on 6/27/2023)   • [DISCONTINUED] Cetirizine HCl 10 MG CAPS Take by mouth daily PRN (Patient not taking: Reported on 8/29/2023)   • [DISCONTINUED] fluticasone (FLONASE) 50 mcg/act nasal spray 2 sprays into each nostril 2 (two) times a day     No current facility-administered medications on file prior to visit. Medications Discontinued During This Encounter   Medication Reason   • fluticasone (FLONASE) 50 mcg/act nasal spray Duplicate order   • Cetirizine HCl 10 MG CAPS    • ketoconazole (NIZORAL) 2 % shampoo       He is allergic to nyquil multi-symptom [pseudoeph-doxylamine-dm-apap] and penicillins. .    Review of Systems   Constitutional: Negative for appetite change, chills, fatigue and fever. HENT: Negative for sore throat and trouble swallowing. Eyes: Negative for redness. Respiratory: Negative for shortness of breath. Cardiovascular: Negative for chest pain and palpitations. Gastrointestinal: Negative for abdominal pain, constipation and diarrhea. Genitourinary: Negative for dysuria and hematuria. Musculoskeletal: Negative for back pain and neck pain. Skin: Negative for rash. Neurological: Negative for seizures, weakness and headaches. Hematological: Negative for adenopathy. Psychiatric/Behavioral: Negative for confusion. The patient is not nervous/anxious. Objective:      /75 (BP Location: Left arm, Patient Position: Sitting, Cuff Size: Standard)   Pulse 74   Temp (!) 96.3 °F (35.7 °C) (Temporal)   Ht 5' 5" (1.651 m)   Wt 85.3 kg (188 lb)   SpO2 95%   BMI 31.28 kg/m²     Results Reviewed     None          Recent Results (from the past 1344 hour(s))   POCT hemoglobin A1c    Collection Time: 10/03/23 10:11 AM   Result Value Ref Range    Hemoglobin A1C 6.4 6.5        Physical Exam  Constitutional:       General: He is not in acute distress. Appearance: Normal appearance. HENT:      Head: Normocephalic and atraumatic. Nose: Nose normal.      Mouth/Throat:      Mouth: Mucous membranes are moist.   Eyes:      Extraocular Movements: Extraocular movements intact. Pupils: Pupils are equal, round, and reactive to light.    Cardiovascular: Rate and Rhythm: Normal rate and regular rhythm. Pulses: Normal pulses. Heart sounds: Normal heart sounds. No murmur heard. No friction rub. Pulmonary:      Effort: Pulmonary effort is normal. No respiratory distress. Breath sounds: Normal breath sounds. No wheezing. Abdominal:      General: Abdomen is flat. Bowel sounds are normal. There is no distension. Palpations: Abdomen is soft. There is no mass. Tenderness: There is no abdominal tenderness. There is no guarding. Musculoskeletal:         General: Normal range of motion. Neurological:      General: No focal deficit present. Mental Status: He is alert and oriented to person, place, and time. Mental status is at baseline. Cranial Nerves: No cranial nerve deficit.    Psychiatric:         Mood and Affect: Mood normal.         Behavior: Behavior normal.

## 2023-10-28 ENCOUNTER — VBI (OUTPATIENT)
Dept: ADMINISTRATIVE | Facility: OTHER | Age: 73
End: 2023-10-28

## 2023-11-14 ENCOUNTER — VBI (OUTPATIENT)
Dept: ADMINISTRATIVE | Facility: OTHER | Age: 73
End: 2023-11-14

## 2023-12-06 DIAGNOSIS — Z79.4 TYPE 2 DIABETES MELLITUS WITHOUT COMPLICATION, WITH LONG-TERM CURRENT USE OF INSULIN (HCC): ICD-10-CM

## 2023-12-06 DIAGNOSIS — E11.9 TYPE 2 DIABETES MELLITUS WITHOUT COMPLICATION, WITH LONG-TERM CURRENT USE OF INSULIN (HCC): ICD-10-CM

## 2023-12-06 DIAGNOSIS — E78.5 HYPERLIPIDEMIA, UNSPECIFIED HYPERLIPIDEMIA TYPE: ICD-10-CM

## 2023-12-06 RX ORDER — PRAVASTATIN SODIUM 40 MG
40 TABLET ORAL DAILY
Qty: 90 TABLET | Refills: 1 | Status: SHIPPED | OUTPATIENT
Start: 2023-12-06

## 2024-01-03 ENCOUNTER — HOSPITAL ENCOUNTER (EMERGENCY)
Facility: HOSPITAL | Age: 74
Discharge: HOME | End: 2024-01-03
Attending: EMERGENCY MEDICINE
Payer: COMMERCIAL

## 2024-01-03 VITALS
SYSTOLIC BLOOD PRESSURE: 169 MMHG | OXYGEN SATURATION: 95 % | DIASTOLIC BLOOD PRESSURE: 90 MMHG | HEART RATE: 87 BPM | RESPIRATION RATE: 16 BRPM | TEMPERATURE: 99.7 F

## 2024-01-03 DIAGNOSIS — J45.909 ASTHMA: ICD-10-CM

## 2024-01-03 DIAGNOSIS — J02.9 SORE THROAT: ICD-10-CM

## 2024-01-03 DIAGNOSIS — J06.9 URI (UPPER RESPIRATORY INFECTION): Primary | ICD-10-CM

## 2024-01-03 LAB
FLUAV RNA RESP QL NAA+PROBE: NEGATIVE
FLUBV RNA RESP QL NAA+PROBE: NEGATIVE
RSV RNA RESP QL NAA+PROBE: NEGATIVE
SARS-COV-2 RNA RESP QL NAA+PROBE: NEGATIVE

## 2024-01-03 PROCEDURE — 0241U HB NFCT DS VIR RESP RNA 4 TRGT: CPT

## 2024-01-03 PROCEDURE — 94640 AIRWAY INHALATION TREATMENT: CPT

## 2024-01-03 PROCEDURE — 99285 EMERGENCY DEPT VISIT HI MDM: CPT

## 2024-01-03 PROCEDURE — 99284 EMERGENCY DEPT VISIT MOD MDM: CPT | Performed by: EMERGENCY MEDICINE

## 2024-01-03 PROCEDURE — 93005 ELECTROCARDIOGRAM TRACING: CPT

## 2024-01-03 RX ORDER — LIDOCAINE HYDROCHLORIDE 20 MG/ML
15 SOLUTION OROPHARYNGEAL 3 TIMES DAILY PRN
Qty: 100 ML | Refills: 0 | Status: SHIPPED | OUTPATIENT
Start: 2024-01-03 | End: 2024-01-06

## 2024-01-03 RX ORDER — ALBUTEROL SULFATE 2.5 MG/3ML
5 SOLUTION RESPIRATORY (INHALATION) ONCE
Status: COMPLETED | OUTPATIENT
Start: 2024-01-03 | End: 2024-01-03

## 2024-01-03 RX ORDER — LIDOCAINE HYDROCHLORIDE 20 MG/ML
15 SOLUTION OROPHARYNGEAL ONCE
Status: DISCONTINUED | OUTPATIENT
Start: 2024-01-03 | End: 2024-01-03 | Stop reason: HOSPADM

## 2024-01-03 RX ORDER — PREDNISONE 20 MG/1
40 TABLET ORAL ONCE
Status: DISCONTINUED | OUTPATIENT
Start: 2024-01-03 | End: 2024-01-03

## 2024-01-03 RX ADMIN — IPRATROPIUM BROMIDE 0.5 MG: 0.5 SOLUTION RESPIRATORY (INHALATION) at 20:07

## 2024-01-03 RX ADMIN — ALBUTEROL SULFATE 5 MG: 2.5 SOLUTION RESPIRATORY (INHALATION) at 20:07

## 2024-01-04 LAB
ATRIAL RATE: 89 BPM
P AXIS: 69 DEGREES
PR INTERVAL: 148 MS
QRS AXIS: -55 DEGREES
QRSD INTERVAL: 138 MS
QT INTERVAL: 402 MS
QTC INTERVAL: 489 MS
T WAVE AXIS: 59 DEGREES
VENTRICULAR RATE: 89 BPM

## 2024-01-04 NOTE — DISCHARGE INSTRUCTIONS
You have been prescribed viscous lidocaine for sore throat relief.  Please follow up with your PCP.  Continue to monitor symptoms including temperature at home.  Please return to the Emergency Department if you experience worsening of your current symptoms, high fevers, difficulty swallowing, difficulty breathing, or any new/other concerning symptoms.

## 2024-01-04 NOTE — ED ATTENDING ATTESTATION
1/3/2024  I, Hunter Vila DO, saw and evaluated the patient. I have discussed the patient with the resident/non-physician practitioner and agree with the resident's/non-physician practitioner's findings, Plan of Care, and MDM as documented in the resident's/non-physician practitioner's note, except where noted. All available labs and Radiology studies were reviewed.  I was present for key portions of any procedure(s) performed by the resident/non-physician practitioner and I was immediately available to provide assistance.       At this point I agree with the current assessment done in the Emergency Department.  I have conducted an independent evaluation of this patient a history and physical is as follows:    Patient is a 73-year-old male with a history of asthma, hypertension, hyperlipidemia, diabetes, sleep apnea on CPAP, says that 2 days ago he began having some cough, runny nose and congestion, no nausea, no vomiting, no bodyaches, no myalgias or arthralgias.  His wife developed similar symptoms the day before, they do share his nebulizer for asthma, and thinks that Saturday, the day before her symptoms started, they may have been exposed to someone sick at [employer].  He has been using his albuterol nebulizer with some mild relief of the coughing and wheezing but then an hour or 2 later when it wears off he continues to have the cough.  He has no chest pain except when he coughs, no pain when he breathes, no pain when he is not coughing.  There has been no travel history, no recent prolonged hospitalizations, inactivity periods or immobilizations.  He does say that once or twice when he coughed he noticed a little tinge of blood in his mucus, not consistently, no blood clots noted.    General:  Patient is well-appearing  Head:  Atraumatic  Eyes:  Conjunctiva pink  ENT: Patient has no facial erythema or swelling,Mucous membranes moist. No swelling of the posterior pharynx. No tonsillar enlargement, exudate,  lesions. No swelling in the floor of the mouth. No uvula deviation. No trismus.  Neck:  Supple, no stridor  Cardiac:  S1-S2, without murmurs  Lungs: Mild end expiratory wheeze, no rales or rhonchi  Abdomen:  Soft, nontender, normal bowel sounds, no CVA tenderness, no tympany, no rigidity, no guarding  Extremities:  Normal range of motion, no pedal edema or calf asymmetry, radial pulses are equal and symmetric bilaterally  Neurologic:  Awake, fluent speech, normal comprehension, AAOx3  Skin:  Pink warm and dry  Psychiatric:  Alert, pleasant, cooperative      ED Course  ED Course as of 01/03/24 2016 Wed Jan 03, 2024 2013 ECG interpreted by me, sinus rhythm, rate of 89, left axis deviation, right bundle branch block, no acute change from November 13, 2017     Labs Reviewed   COVID19, INFLUENZA A/B, RSV PCR, [facility] - Normal       Result Value Ref Range Status    SARS-CoV-2 Negative  Negative Final    Comment:      INFLUENZA A PCR Negative  Negative Final    Comment:      INFLUENZA B PCR Negative  Negative Final    Comment:      RSV PCR Negative  Negative Final    Comment:      Narrative:     FOR PEDIATRIC PATIENTS - copy/paste COVID Guidelines URL to browser: [email or web address]    SARS-CoV-2 assay is a Nucleic Acid Amplification assay intended for the  qualitative detection of nucleic acid from SARS-CoV-2 in nasopharyngeal  swabs. Results are for the presumptive identification of SARS-CoV-2 RNA.    Positive results are indicative of infection with SARS-CoV-2, the virus  causing COVID-19, but do not rule out bacterial infection or co-infection  with other viruses. Laboratories within the [place] and its  territories are required to report all positive results to the appropriate  public health authorities. Negative results do not preclude SARS-CoV-2  infection and should not be used as the sole basis for treatment or other  patient management decisions.  Negative results must be combined with  clinical observations, patient history, and epidemiological information.  This test has not been FDA cleared or approved.    This test has been authorized by FDA under an Emergency Use Authorization  (EUA). This test is only authorized for the duration of time the  declaration that circumstances exist justifying the authorization of the  emergency use of an in vitro diagnostic tests for detection of SARS-CoV-2  virus and/or diagnosis of COVID-19 infection under section 564(b)(1) of  the Act, 21 U.S.C. 360bbb-3(b)(1), unless the authorization is terminated  or revoked sooner. The test has been validated but independent review by FDA  and CLIA is pending.    Test performed using [employer] GeneXpert: This RT-PCR assay targets N2,  a region unique to SARS-CoV-2. A conserved region in the E-gene was chosen  for pan-Sarbecovirus detection which includes SARS-CoV-2.    According to CMS-2020-01-R, this platform meets the definition of high-throughput technology.     On reassessment after breathing treatment patient was feeling better.  At this point I suspect the patient most likely has a viral illness, lungs do not have focal findings, do not believe that a chest x-ray is indicated or this represents pneumonia.  No evidence of life-threatening dysrhythmia.        MEDICAL DECISION MAKING CODING    Patient presents with acute new problem with:  Threat to life or bodily function      Chronic conditions affecting care: As per HPI    COLLECTION AND INTERPRETATION OF DATA  I reviewed prior external notes, including November 13, 2017 ECG as noted above      I ordered each unique test  Tests reviewed personally by me:  ECG: See my ED course  Labs: See above      Tests considered but not ordered: See above    RISK  Drugs (OTC, Rx, Controlled substances): Prescription management  All of the patient's current prescription medications should be continued.    Social Determinants of  Health:  Presentation to ED outside of business hours or on night shift      Critical Care Time  Procedures

## 2024-01-05 NOTE — ED PROVIDER NOTES
History  Chief Complaint   Patient presents with    URI     Patient reports worsening congestion in his nose and throat. Patient reports coughing with bloody phlegm. Patient reports pain in his throat. Patietn reports hx of asthma and use of albuterol inhaler without relief. Patient reports using robitussin yesterday and 10am today. Patient hx of SOB due to pseudoepheds.      HPI  ED Course as of 01/05/24 1341   Wed Jan 03, 2024 2004 HPI: Patient is a 73 y.o. male with PMHx T2DM, asthma, who presents to the ED for evaluation of productive cough, congestion, and wheezing for the past three days. Patient reports calling his PCP twice today without a call back and came to the ED due to the cough and wheezing. States his inhaler/nebulizer are not working as well as they have previously. His wife is also sick and they share a nebulizer. Denies fevers, chills, chest pain, dyspnea on exertion, abdominal pain, n/v/d, extremity swelling, or any other complaints or concerns at this time.   2007 ASSESSMENT: Patient is a 73 y.o. male who presents with viral symptoms.   DDX includes but not limited to: viral illness, URI, doubt pneumonia, doubt asthma exacerbation.   PLAN: Viral swab. Treated with albuterol/ipratropium.   2110 FLU/RSV/COVID - if FLU/RSV clinically relevant  Negative   2113 Patient reevaluated, reports improvement in symptoms. States he would like to try something for his sore throat. Viscous lidocaine ordered. Denies any new or worsening complaints or concerns at this time. Repeat PE shows lungs with improved wheezing, now minimal bilaterally. Discussed results and plan with patient. Advised on need for outpatient follow up, given information. Given return precautions verbally and in discharge instructions, confirmed with teach back method. Patient given Rx for viscous lidocaine and advised on proper use. All questions answered prior to discharge. Patient expressed verbal understanding and is agreeable with  plan for discharge with outpatient follow up.       Prior to Admission Medications   Prescriptions Last Dose Informant Patient Reported? Taking?   Fluticasone-Salmeterol (Advair) 250-50 mcg/dose inhaler  Self No No   Sig: Inhale 1 puff 2 (two) times a day Rinse mouth after use.   acetaminophen (TYLENOL) 500 mg tablet  Self No No   Sig: Take 1 tablet (500 mg total) by mouth every 6 (six) hours as needed for mild pain or moderate pain   amLODIPine (NORVASC) 5 mg tablet  Self No No   Sig: Take 1 tablet (5 mg total) by mouth daily   chlorhexidine (PERIDEX) 0.12 % solution  Self Yes No   Patient not taking: Reported on 2023   ciclopirox (LOPROX) 0.77 % SUSP   No No   Sig: Apply 1 Application topically 2 (two) times a day   fluticasone (FLONASE) 50 mcg/act nasal spray  Self No No   Si sprays into each nostril daily   latanoprost (XALATAN) 0.005 % ophthalmic solution  Self Yes No   lisinopril (ZESTRIL) 40 mg tablet   No No   Sig: Take 1 tablet (40 mg total) by mouth daily   magnesium hydroxide (MILK OF MAGNESIA) 400 mg/5 mL oral suspension  Self No No   Sig: Take 15 mL by mouth daily at bedtime   Patient not taking: Reported on 2023   metFORMIN (GLUCOPHAGE) 500 mg tablet   No No   Sig: Take 1 tablet (500 mg total) by mouth daily   pravastatin (PRAVACHOL) 40 mg tablet   No No   Sig: Take 1 tablet (40 mg total) by mouth daily   psyllium (METAMUCIL,KONSYL) 30.9 %  Self No No   Sig: Take 7.5 g by mouth daily at bedtime      Facility-Administered Medications: None       Past Medical History:   Diagnosis Date    Asthma     Bladder cancer (HCC)     Cancer (HCC)     Bladder Cancer Hx    Constipation     CPAP (continuous positive airway pressure) dependence     stopped using    Diabetes mellitus (HCC)     Diabetes mellitus due to underlying condition, controlled, without complication, without long-term current use of insulin (HCC)     HTN (hypertension)     Hyperlipidemia     Hyperlipidemia     Hypertension     Lung  disease     Non-seasonal allergic rhinitis     Obstructive sleep apnea (adult) (pediatric)     Other asthma     Rhinophyma     Sleep apnea with use of continuous positive airway pressure (CPAP)        Past Surgical History:   Procedure Laterality Date    APPENDECTOMY      BLADDER SURGERY      bladder cancer    CECECTOMY LAPAROSCOPIC      COLONOSCOPY  02/10/2016    Abnormal     CYSTOSCOPY      GA NASAL/SINUS NDSC W/TOTAL ETHOIDECTOMY N/A 12/11/2017    Procedure: ENDOSCOPIC SINUS SURGERY; EXCISION RHINOPHYMA;  Surgeon: Vincenzo Smith MD;  Location: [facility];  Service: ENT    GA SEPTOPLASTY/SUBMUCOUS RESECJ W/WO CARTILAGE GRF N/A 12/11/2017    Procedure: SEPTOPLASTY; TURBINOPLASTY;  Surgeon: Vincenzo Smith MD;  Location: [facility];  Service: ENT    GA SEPTOPLASTY/SUBMUCOUS RESECJ W/WO CARTILAGE GRF N/A 5/28/2021    Procedure: RESECTION OF RHINOPHYMA RECURRENT EXCISION OF RHINOPHYMA;  Surgeon: Vincenzo Smith MD;  Location: [facility];  Service: ENT    PROSTATE SURGERY      prostate shaved        Family History   Problem Relation Age of Onset    Diabetes Mother     Heart disease Mother     Diabetes Father     Heart disease Father     Dementia Sister         in [facility]    Coronary artery disease Brother     Hypertension Family     Lung cancer Brother      I have reviewed and agree with the history as documented.    E-Cigarette/Vaping    E-Cigarette Use Never User      E-Cigarette/Vaping Substances    Nicotine No     THC No     CBD No     Flavoring No      Social History     Tobacco Use    Smoking status: Never    Smokeless tobacco: Never   Vaping Use    Vaping status: Never Used   Substance Use Topics    Alcohol use: No    Drug use: No        Review of Systems   HENT:  Positive for congestion.    Respiratory:  Positive for cough and wheezing.        Physical Exam  ED Triage Vitals   Temperature Pulse Respirations Blood Pressure SpO2   01/03/24 1815 01/03/24 1816 01/03/24 1816 01/03/24 1816 01/03/24 1816   99.7 °F (37.6 °C) 91 18  169/90 99 %      Temp Source Heart Rate Source Patient Position - Orthostatic VS BP Location FiO2 (%)   01/03/24 1815 01/03/24 1816 01/03/24 1816 01/03/24 1816 --   Oral Monitor Lying Right arm       Pain Score       --                    Orthostatic Vital Signs  Vitals:    01/03/24 1816 01/03/24 2045 01/03/24 2100   BP: 169/90     Pulse: 91 90 87   Patient Position - Orthostatic VS: Lying         Physical Exam  Vitals and nursing note reviewed.   Constitutional:       General: He is not in acute distress.  HENT:      Head: Normocephalic and atraumatic.      Mouth/Throat:      Mouth: Mucous membranes are moist.   Eyes:      General: No scleral icterus.     Conjunctiva/sclera: Conjunctivae normal.   Cardiovascular:      Rate and Rhythm: Normal rate and regular rhythm.      Pulses: Normal pulses.      Heart sounds: Normal heart sounds.   Pulmonary:      Effort: Pulmonary effort is normal. No respiratory distress.      Breath sounds: Wheezing (moderate diffuse throughout) present.   Abdominal:      Palpations: Abdomen is soft.      Tenderness: There is no abdominal tenderness. There is no guarding or rebound.   Musculoskeletal:         General: No deformity. Normal range of motion.      Cervical back: Normal range of motion and neck supple.   Skin:     General: Skin is warm.      Capillary Refill: Capillary refill takes less than 2 seconds.      Findings: No rash.   Neurological:      Mental Status: He is alert. Mental status is at baseline.   Psychiatric:         Mood and Affect: Mood normal.         Behavior: Behavior normal.         ED Medications  Medications   albuterol inhalation solution 5 mg (5 mg Nebulization Given 1/3/24 2007)   ipratropium (ATROVENT) 0.02 % inhalation solution 0.5 mg (0.5 mg Nebulization Given 1/3/24 2007)       Diagnostic Studies  Results Reviewed       Procedure Component Value Units Date/Time    FLU/RSV/COVID - if FLU/RSV clinically relevant [[id number]]  (Normal) Collected: 01/03/24 2006     Lab Status: Final result Specimen: Nares from Nose Updated: 01/03/24 2102     SARS-CoV-2 Negative     INFLUENZA A PCR Negative     INFLUENZA B PCR Negative     RSV PCR Negative    Narrative:      FOR PEDIATRIC PATIENTS - copy/paste COVID Guidelines URL to browser: [email or web address]    SARS-CoV-2 assay is a Nucleic Acid Amplification assay intended for the  qualitative detection of nucleic acid from SARS-CoV-2 in nasopharyngeal  swabs. Results are for the presumptive identification of SARS-CoV-2 RNA.    Positive results are indicative of infection with SARS-CoV-2, the virus  causing COVID-19, but do not rule out bacterial infection or co-infection  with other viruses. Laboratories within the [place] and its  territories are required to report all positive results to the appropriate  public health authorities. Negative results do not preclude SARS-CoV-2  infection and should not be used as the sole basis for treatment or other  patient management decisions. Negative results must be combined with  clinical observations, patient history, and epidemiological information.  This test has not been FDA cleared or approved.    This test has been authorized by FDA under an Emergency Use Authorization  (EUA). This test is only authorized for the duration of time the  declaration that circumstances exist justifying the authorization of the  emergency use of an in vitro diagnostic tests for detection of SARS-CoV-2  virus and/or diagnosis of COVID-19 infection under section 564(b)(1) of  the Act, 21 U.S.C. 360bbb-3(b)(1), unless the authorization is terminated  or revoked sooner. The test has been validated but independent review by FDA  and CLIA is pending.    Test performed using [employer]: This RT-PCR assay targets N2,  a region unique to SARS-CoV-2. A conserved region in the E-gene was chosen  for pan-Sarbecovirus detection which includes  SARS-CoV-2.    According to CMS-2020-01-R, this platform meets the definition of high-throughput technology.                   No orders to display         Procedures  Procedures      ED Course  ED Course as of 01/05/24 1341   Wed Jan 03, 2024 2004 HPI: Patient is a 73 y.o. male with PMHx T2DM, asthma, who presents to the ED for evaluation of productive cough, congestion, and wheezing for the past three days. Patient reports calling his PCP twice today without a call back and came to the ED due to the cough and wheezing. States his inhaler/nebulizer are not working as well as they have previously. His wife is also sick and they share a nebulizer. Denies fevers, chills, chest pain, dyspnea on exertion, abdominal pain, n/v/d, extremity swelling, or any other complaints or concerns at this time.   2007 ASSESSMENT: Patient is a 73 y.o. male who presents with viral symptoms.   DDX includes but not limited to: viral illness, URI, doubt pneumonia, doubt asthma exacerbation.   PLAN: Viral swab. Treated with albuterol/ipratropium.   2110 FLU/RSV/COVID - if FLU/RSV clinically relevant  Negative   2113 Patient reevaluated, reports improvement in symptoms. States he would like to try something for his sore throat. Viscous lidocaine ordered. Denies any new or worsening complaints or concerns at this time. Repeat PE shows lungs with improved wheezing, now minimal bilaterally. Discussed results and plan with patient. Advised on need for outpatient follow up, given information. Given return precautions verbally and in discharge instructions, confirmed with teach back method. Patient given Rx for viscous lidocaine and advised on proper use. All questions answered prior to discharge. Patient expressed verbal understanding and is agreeable with plan for discharge with outpatient follow up.                             SBIRT 20yo+      Flowsheet Row Most Recent Value   Initial Alcohol Screen: US AUDIT-C     1. How often do you have a  drink containing alcohol? 0 Filed at: 01/03/2024 2015   2. How many drinks containing alcohol do you have on a typical day you are drinking?  0 Filed at: 01/03/2024 2015   3b. FEMALE Any Age, or MALE 65+: How often do you have 4 or more drinks on one occassion? 0 Filed at: 01/03/2024 2015   Audit-C Score 0 Filed at: 01/03/2024 2015   WENDY: How many times in the past year have you...    Used an illegal drug or used a prescription medication for non-medical reasons? Never Filed at: 01/03/2024 2015                  Medical Decision Making  Problems Addressed:  Asthma: chronic illness or injury with exacerbation, progression, or side effects of treatment  Sore throat: acute illness or injury  URI (upper respiratory infection): acute illness or injury    Amount and/or Complexity of Data Reviewed  Labs:  Decision-making details documented in ED Course.    Risk  Prescription drug management.        See ED course above for additional details including HPI, MDM including PLAN, and disposition.    Disposition  Final diagnoses:   URI (upper respiratory infection)   Sore throat   Asthma     Time reflects when diagnosis was documented in both MDM as applicable and the Disposition within this note       Time User Action Codes Description Comment    1/3/2024  9:11 PM DePRadha reynolds Add [J06.9] URI (upper respiratory infection)     1/3/2024  9:11 PM DePRadha reynolds Add [J02.9] Sore throat     1/3/2024  9:11 PM DePRadha reynolds Add [J45.909] Asthma           ED Disposition       ED Disposition   Discharge    Condition   Stable    Date/Time   Wed Nino 3, 2024 2110    Comment   Anirudh Hernadez discharge to home/self care.                   Follow-up Information       Follow up With Specialties Details Why Contact Info Additional Information    Javier Lisa MD Internal Medicine Schedule an appointment as soon as possible for a visit   [address] [place] [address]  [phone]       [facility]  Emergency Department Emergency Medicine Go to  If symptoms worsen [address] [facility] [address]  [phone] [facility] Emergency Department, [address] [place], [place], [address]   [phone]            Discharge Medication List as of 1/3/2024  9:13 PM        START taking these medications    Details   Lidocaine Viscous HCl (XYLOCAINE) 2 % mucosal solution Swish and spit 15 mL 3 (three) times a day as needed for mouth pain or discomfort for up to 3 days, Starting Wed 1/3/2024, Until Sat 1/6/2024 at 2359, Normal           CONTINUE these medications which have NOT CHANGED    Details   acetaminophen (TYLENOL) 500 mg tablet Take 1 tablet (500 mg total) by mouth every 6 (six) hours as needed for mild pain or moderate pain, Starting Fri 5/28/2021, Normal      amLODIPine (NORVASC) 5 mg tablet Take 1 tablet (5 mg total) by mouth daily, Starting Tue 8/8/2023, Normal      chlorhexidine (PERIDEX) 0.12 % solution Historical Med      ciclopirox (LOPROX) 0.77 % SUSP Apply 1 Application topically 2 (two) times a day, Starting Tue 8/29/2023, Normal      fluticasone (FLONASE) 50 mcg/act nasal spray 2 sprays into each nostril daily, Starting Wed 8/10/2022, Normal      Fluticasone-Salmeterol (Advair) 250-50 mcg/dose inhaler Inhale 1 puff 2 (two) times a day Rinse mouth after use., Starting Wed 5/24/2023, Until Mon 11/20/2023, Normal      latanoprost (XALATAN) 0.005 % ophthalmic solution Starting Thu 6/10/2021, Historical Med      lisinopril (ZESTRIL) 40 mg tablet Take 1 tablet (40 mg total) by mouth daily, Starting Tue 9/5/2023, Normal      magnesium hydroxide (MILK OF MAGNESIA) 400 mg/5 mL oral suspension Take 15 mL by mouth daily at bedtime, Starting Mon 3/1/2021, Print      metFORMIN (GLUCOPHAGE) 500 mg tablet Take 1 tablet (500 mg total) by mouth daily, Starting Wed 12/6/2023, Until Tue 3/5/2024, Normal      pravastatin (PRAVACHOL) 40 mg tablet Take 1 tablet (40 mg total) by  mouth daily, Starting Wed 12/6/2023, Normal      psyllium (METAMUCIL,KONSYL) 30.9 % Take 7.5 g by mouth daily at bedtime, Starting Mon 3/1/2021, Print           No discharge procedures on file.    PDMP Review         Value Time User    PDMP Reviewed  Yes 5/28/2021 12:02 PM Vincenzo Smith MD             ED Provider  Attending physically available and evaluated Anirudh Hernadez. I managed the patient along with the ED Attending.    Electronically Signed by           Radha Tucker DO  01/05/24 [id number]

## 2024-01-09 ENCOUNTER — OFFICE VISIT (OUTPATIENT)
Dept: INTERNAL MEDICINE CLINIC | Facility: CLINIC | Age: 74
End: 2024-01-09
Payer: COMMERCIAL

## 2024-01-09 VITALS
HEIGHT: 65 IN | DIASTOLIC BLOOD PRESSURE: 84 MMHG | OXYGEN SATURATION: 96 % | BODY MASS INDEX: 30.66 KG/M2 | SYSTOLIC BLOOD PRESSURE: 148 MMHG | TEMPERATURE: 97.3 F | HEART RATE: 85 BPM | WEIGHT: 184 LBS

## 2024-01-09 DIAGNOSIS — I10 HYPERTENSION, UNSPECIFIED TYPE: ICD-10-CM

## 2024-01-09 DIAGNOSIS — J45.41 MODERATE PERSISTENT ASTHMA WITH ACUTE EXACERBATION: Primary | ICD-10-CM

## 2024-01-09 DIAGNOSIS — E11.628 TYPE 2 DIABETES MELLITUS WITH OTHER SKIN COMPLICATIONS (HCC): ICD-10-CM

## 2024-01-09 DIAGNOSIS — C67.9 MALIGNANT NEOPLASM OF URINARY BLADDER, UNSPECIFIED SITE (HCC): ICD-10-CM

## 2024-01-09 DIAGNOSIS — I70.0 ATHEROSCLEROSIS OF AORTA (HCC): ICD-10-CM

## 2024-01-09 DIAGNOSIS — J04.10 TRACHEITIS: ICD-10-CM

## 2024-01-09 PROCEDURE — 96372 THER/PROPH/DIAG INJ SC/IM: CPT | Performed by: INTERNAL MEDICINE

## 2024-01-09 PROCEDURE — 99214 OFFICE O/P EST MOD 30 MIN: CPT | Performed by: INTERNAL MEDICINE

## 2024-01-09 RX ORDER — CODEINE PHOSPHATE/GUAIFENESIN 10-100MG/5
5 LIQUID (ML) ORAL 3 TIMES DAILY PRN
Qty: 200 ML | Refills: 0 | Status: SHIPPED | OUTPATIENT
Start: 2024-01-09

## 2024-01-09 RX ORDER — LISINOPRIL 40 MG/1
40 TABLET ORAL DAILY
Qty: 90 TABLET | Refills: 1 | Status: SHIPPED | OUTPATIENT
Start: 2024-01-09

## 2024-01-09 RX ORDER — AMLODIPINE BESYLATE 5 MG/1
5 TABLET ORAL DAILY
Qty: 90 TABLET | Refills: 1 | Status: SHIPPED | OUTPATIENT
Start: 2024-01-09

## 2024-01-09 RX ORDER — AZITHROMYCIN 250 MG/1
TABLET, FILM COATED ORAL
Qty: 6 TABLET | Refills: 0 | Status: SHIPPED | OUTPATIENT
Start: 2024-01-09 | End: 2024-01-13

## 2024-01-09 RX ORDER — AZITHROMYCIN 250 MG/1
TABLET, FILM COATED ORAL
Qty: 6 TABLET | Refills: 0 | Status: SHIPPED | OUTPATIENT
Start: 2024-01-09 | End: 2024-01-09

## 2024-01-09 RX ORDER — ALBUTEROL SULFATE 2.5 MG/3ML
2.5 SOLUTION RESPIRATORY (INHALATION) EVERY 6 HOURS PRN
Qty: 360 ML | Refills: 0 | Status: SHIPPED | OUTPATIENT
Start: 2024-01-09 | End: 2024-04-08

## 2024-01-09 RX ORDER — METHYLPREDNISOLONE ACETATE 80 MG/ML
80 INJECTION, SUSPENSION INTRA-ARTICULAR; INTRALESIONAL; INTRAMUSCULAR; SOFT TISSUE ONCE
Status: COMPLETED | OUTPATIENT
Start: 2024-01-09 | End: 2024-01-09

## 2024-01-09 RX ADMIN — METHYLPREDNISOLONE ACETATE 80 MG: 80 INJECTION, SUSPENSION INTRA-ARTICULAR; INTRALESIONAL; INTRAMUSCULAR; SOFT TISSUE at 11:10

## 2024-01-09 NOTE — PROGRESS NOTES
Assessment/Plan:           1. Moderate persistent asthma with acute exacerbation  Comments:  Depo-Medrol 80 mg injection given.  Orders:  -     albuterol (2.5 mg/3 mL) 0.083 % nebulizer solution; Take 3 mL (2.5 mg total) by nebulization every 6 (six) hours as needed for wheezing or shortness of breath  -     methylPREDNISolone acetate (DEPO-MEDROL) injection 80 mg    2. Hypertension, unspecified type  -     lisinopril (ZESTRIL) 40 mg tablet; Take 1 tablet (40 mg total) by mouth daily  -     amLODIPine (NORVASC) 5 mg tablet; Take 1 tablet (5 mg total) by mouth daily    3. Hypertension, unspecified type  Comments:  Blood pressure stable continue lisinopril and amlodipine.  Orders:  -     lisinopril (ZESTRIL) 40 mg tablet; Take 1 tablet (40 mg total) by mouth daily  -     amLODIPine (NORVASC) 5 mg tablet; Take 1 tablet (5 mg total) by mouth daily    4. Malignant neoplasm of urinary bladder, unspecified site (HCC)    5. Type 2 diabetes mellitus with other skin complications (HCC)    6. Atherosclerosis of aorta (HCC)    7. Tracheitis  Comments:  Azithromycin prescribed.  Orders:  -     guaifenesin-codeine (GUAIFENESIN AC) 100-10 MG/5ML liquid; Take 5 mL by mouth 3 (three) times a day as needed for cough  -     azithromycin (ZITHROMAX) 250 mg tablet; Take 2 tablets today then 1 tablet daily x 4 days           1. Moderate persistent asthma with acute exacerbation      2. Hypertension, unspecified type    - lisinopril (ZESTRIL) 40 mg tablet; Take 1 tablet (40 mg total) by mouth daily  Dispense: 90 tablet; Refill: 1  - amLODIPine (NORVASC) 5 mg tablet; Take 1 tablet (5 mg total) by mouth daily  Dispense: 90 tablet; Refill: 1    3. Hypertension, unspecified type    - lisinopril (ZESTRIL) 40 mg tablet; Take 1 tablet (40 mg total) by mouth daily  Dispense: 90 tablet; Refill: 1  - amLODIPine (NORVASC) 5 mg tablet; Take 1 tablet (5 mg total) by mouth daily  Dispense: 90 tablet; Refill: 1    4. Malignant neoplasm of urinary  bladder, unspecified site (HCC)      5. Type 2 diabetes mellitus with other skin complications (HCC)      6. Atherosclerosis of aorta (HCC)         No problem-specific Assessment & Plan notes found for this encounter.           Subjective:      Patient ID: Anirudh Hernadez is a 73 y.o. male.    HPI    The following portions of the patient's history were reviewed and updated as appropriate: He  has a past medical history of Asthma, Bladder cancer (HCC), Cancer (HCC), Constipation, CPAP (continuous positive airway pressure) dependence, Diabetes mellitus (HCC), Diabetes mellitus due to underlying condition, controlled, without complication, without long-term current use of insulin (HCC), HTN (hypertension), Hyperlipidemia, Hyperlipidemia, Hypertension, Lung disease, Non-seasonal allergic rhinitis, Obstructive sleep apnea (adult) (pediatric), Other asthma, Rhinophyma, and Sleep apnea with use of continuous positive airway pressure (CPAP).  He   Patient Active Problem List    Diagnosis Date Noted    Atherosclerosis of aorta (HCC) 10/03/2023    Type 2 diabetes mellitus with other skin complications (HCC) 02/28/2023    History of bladder cancer 07/26/2022    Sleep apnea with use of continuous positive airway pressure (CPAP)     HTN (hypertension)     Hyperlipidemia     Rhinophyma     Diabetes mellitus due to underlying condition, controlled, without complication, without long-term current use of insulin (HCC)     Asthma     Bladder cancer (HCC)     Sarcoid 10/10/2017     He  has a past surgical history that includes Appendectomy; Cystoscopy; pr septoplasty/submucous resecj w/wo cartilage grf (N/A, 12/11/2017); pr nasal/sinus ndsc w/total ethoidectomy (N/A, 12/11/2017); Bladder surgery; Prostate surgery; Colonoscopy (02/10/2016); CECECTOMY LAPAROSCOPIC; and pr septoplasty/submucous resecj w/wo cartilage grf (N/A, 5/28/2021).  His family history includes Coronary artery disease in his brother; Dementia in his sister;  Diabetes in his father and mother; Heart disease in his father and mother; Hypertension in his family; Lung cancer in his brother.  He  reports that he has never smoked. He has never used smokeless tobacco. He reports that he does not drink alcohol and does not use drugs.  Current Outpatient Medications   Medication Sig Dispense Refill    acetaminophen (TYLENOL) 500 mg tablet Take 1 tablet (500 mg total) by mouth every 6 (six) hours as needed for mild pain or moderate pain 40 tablet 0    albuterol (2.5 mg/3 mL) 0.083 % nebulizer solution Take 3 mL (2.5 mg total) by nebulization every 6 (six) hours as needed for wheezing or shortness of breath 360 mL 0    amLODIPine (NORVASC) 5 mg tablet Take 1 tablet (5 mg total) by mouth daily 90 tablet 1    azithromycin (ZITHROMAX) 250 mg tablet Take 2 tablets today then 1 tablet daily x 4 days 6 tablet 0    ciclopirox (LOPROX) 0.77 % SUSP Apply 1 Application topically 2 (two) times a day 60 mL 3    fluticasone (FLONASE) 50 mcg/act nasal spray 2 sprays into each nostril daily 16 g 5    Fluticasone-Salmeterol (Advair) 250-50 mcg/dose inhaler Inhale 1 puff 2 (two) times a day Rinse mouth after use. 180 blister 1    guaifenesin-codeine (GUAIFENESIN AC) 100-10 MG/5ML liquid Take 5 mL by mouth 3 (three) times a day as needed for cough 200 mL 0    latanoprost (XALATAN) 0.005 % ophthalmic solution       lisinopril (ZESTRIL) 40 mg tablet Take 1 tablet (40 mg total) by mouth daily 90 tablet 1    metFORMIN (GLUCOPHAGE) 500 mg tablet Take 1 tablet (500 mg total) by mouth daily 90 tablet 0    pravastatin (PRAVACHOL) 40 mg tablet Take 1 tablet (40 mg total) by mouth daily 90 tablet 1    psyllium (METAMUCIL,KONSYL) 30.9 % Take 7.5 g by mouth daily at bedtime 500 g 2    chlorhexidine (PERIDEX) 0.12 % solution  (Patient not taking: Reported on 6/27/2023)      magnesium hydroxide (MILK OF MAGNESIA) 400 mg/5 mL oral suspension Take 15 mL by mouth daily at bedtime (Patient not taking: Reported on  6/27/2023) 500 mL 1     No current facility-administered medications for this visit.     Current Outpatient Medications on File Prior to Visit   Medication Sig    acetaminophen (TYLENOL) 500 mg tablet Take 1 tablet (500 mg total) by mouth every 6 (six) hours as needed for mild pain or moderate pain    ciclopirox (LOPROX) 0.77 % SUSP Apply 1 Application topically 2 (two) times a day    fluticasone (FLONASE) 50 mcg/act nasal spray 2 sprays into each nostril daily    Fluticasone-Salmeterol (Advair) 250-50 mcg/dose inhaler Inhale 1 puff 2 (two) times a day Rinse mouth after use.    latanoprost (XALATAN) 0.005 % ophthalmic solution     metFORMIN (GLUCOPHAGE) 500 mg tablet Take 1 tablet (500 mg total) by mouth daily    pravastatin (PRAVACHOL) 40 mg tablet Take 1 tablet (40 mg total) by mouth daily    psyllium (METAMUCIL,KONSYL) 30.9 % Take 7.5 g by mouth daily at bedtime    [DISCONTINUED] amLODIPine (NORVASC) 5 mg tablet Take 1 tablet (5 mg total) by mouth daily    [DISCONTINUED] lisinopril (ZESTRIL) 40 mg tablet Take 1 tablet (40 mg total) by mouth daily    chlorhexidine (PERIDEX) 0.12 % solution  (Patient not taking: Reported on 6/27/2023)    magnesium hydroxide (MILK OF MAGNESIA) 400 mg/5 mL oral suspension Take 15 mL by mouth daily at bedtime (Patient not taking: Reported on 6/27/2023)     No current facility-administered medications on file prior to visit.     Medications Discontinued During This Encounter   Medication Reason    amLODIPine (NORVASC) 5 mg tablet Reorder    lisinopril (ZESTRIL) 40 mg tablet Reorder    azithromycin (ZITHROMAX) 250 mg tablet       He is allergic to nyquil multi-symptom [pseudoeph-doxylamine-dm-apap] and penicillins..    Review of Systems   Constitutional:  Negative for appetite change, chills, fatigue and fever.   HENT:  Positive for congestion. Negative for sore throat and trouble swallowing.    Eyes:  Negative for redness.   Respiratory:  Positive for cough and wheezing. Negative for  "shortness of breath.    Cardiovascular:  Negative for chest pain and palpitations.   Gastrointestinal:  Negative for abdominal pain, constipation and diarrhea.   Genitourinary:  Negative for dysuria and hematuria.   Musculoskeletal:  Negative for back pain and neck pain.   Skin:  Negative for rash.   Neurological:  Negative for seizures, weakness and headaches.   Hematological:  Negative for adenopathy.   Psychiatric/Behavioral:  Negative for confusion. The patient is not nervous/anxious.          Objective:      /84 (BP Location: Left arm, Patient Position: Sitting, Cuff Size: Standard)   Pulse 85   Temp (!) 97.3 °F (36.3 °C) (Temporal)   Ht 5' 5\" (1.651 m)   Wt 83.5 kg (184 lb)   SpO2 96%   BMI 30.62 kg/m²     Results Reviewed       None            Recent Results (from the past 1344 hour(s))   ECG 12 lead    Collection Time: 01/03/24  6:19 PM   Result Value Ref Range    Ventricular Rate 89 BPM    Atrial Rate 89 BPM    IN Interval 148 ms    QRSD Interval 138 ms    QT Interval 402 ms    QTC Interval 489 ms    P [place] 69 degrees    QRS Axis -55 degrees    T Wave Axis 59 degrees   FLU/RSV/COVID - if FLU/RSV clinically relevant    Collection Time: 01/03/24  8:06 PM    Specimen: Nose; Nares   Result Value Ref Range    SARS-CoV-2 Negative Negative    INFLUENZA A PCR Negative Negative    INFLUENZA B PCR Negative Negative    RSV PCR Negative Negative   ALBUMIN / CREATININE URINE RATIO    Collection Time: 01/05/24  8:28 AM   Result Value Ref Range    Creatinine, Ur 234.1 (H) 50.0 - 200.0 mg/dL    Albumin,U,Random 7.0 (H) <3.0 mg/dL    Albumin Creat Ratio 29.9 <30.0 mg/gm CREA        Physical Exam  Constitutional:       General: He is not in acute distress.     Appearance: Normal appearance.   HENT:      Head: Normocephalic and atraumatic.      Nose: Nose normal.      Mouth/Throat:      Mouth: Mucous membranes are moist.   Eyes:      Extraocular Movements: Extraocular movements intact.      Pupils: Pupils are " equal, round, and reactive to light.   Cardiovascular:      Rate and Rhythm: Normal rate and regular rhythm.      Pulses: Normal pulses.      Heart sounds: Normal heart sounds. No murmur heard.     No friction rub.   Pulmonary:      Effort: Pulmonary effort is normal. No respiratory distress.      Breath sounds: Wheezing present.   Abdominal:      General: Abdomen is flat. Bowel sounds are normal. There is no distension.      Palpations: Abdomen is soft. There is no mass.      Tenderness: There is no abdominal tenderness. There is no guarding.   Musculoskeletal:         General: Normal range of motion.      Cervical back: Normal range of motion.   Neurological:      General: No focal deficit present.      Mental Status: He is alert and oriented to person, place, and time. Mental status is at baseline.      Cranial Nerves: No cranial nerve deficit.   Psychiatric:         Mood and Affect: Mood normal.         Behavior: Behavior normal.

## 2024-02-22 ENCOUNTER — RA CDI HCC (OUTPATIENT)
Dept: OTHER | Facility: HOSPITAL | Age: 74
End: 2024-02-22

## 2024-02-29 ENCOUNTER — RA CDI HCC (OUTPATIENT)
Dept: OTHER | Facility: HOSPITAL | Age: 74
End: 2024-02-29

## 2024-03-05 ENCOUNTER — OFFICE VISIT (OUTPATIENT)
Dept: INTERNAL MEDICINE CLINIC | Facility: CLINIC | Age: 74
End: 2024-03-05
Payer: COMMERCIAL

## 2024-03-05 VITALS
OXYGEN SATURATION: 97 % | WEIGHT: 184 LBS | HEART RATE: 72 BPM | TEMPERATURE: 97.7 F | SYSTOLIC BLOOD PRESSURE: 138 MMHG | BODY MASS INDEX: 30.66 KG/M2 | HEIGHT: 65 IN | DIASTOLIC BLOOD PRESSURE: 84 MMHG

## 2024-03-05 DIAGNOSIS — I10 HYPERTENSION, UNSPECIFIED TYPE: Primary | ICD-10-CM

## 2024-03-05 DIAGNOSIS — Z79.4 TYPE 2 DIABETES MELLITUS WITHOUT COMPLICATION, WITH LONG-TERM CURRENT USE OF INSULIN (HCC): ICD-10-CM

## 2024-03-05 DIAGNOSIS — E78.5 HYPERLIPIDEMIA, UNSPECIFIED HYPERLIPIDEMIA TYPE: ICD-10-CM

## 2024-03-05 DIAGNOSIS — C67.9 MALIGNANT NEOPLASM OF URINARY BLADDER, UNSPECIFIED SITE (HCC): ICD-10-CM

## 2024-03-05 DIAGNOSIS — E11.9 TYPE 2 DIABETES MELLITUS WITHOUT COMPLICATION, WITH LONG-TERM CURRENT USE OF INSULIN (HCC): ICD-10-CM

## 2024-03-05 DIAGNOSIS — Z00.00 MEDICARE ANNUAL WELLNESS VISIT, SUBSEQUENT: ICD-10-CM

## 2024-03-05 PROCEDURE — G0439 PPPS, SUBSEQ VISIT: HCPCS | Performed by: INTERNAL MEDICINE

## 2024-03-05 PROCEDURE — 99214 OFFICE O/P EST MOD 30 MIN: CPT | Performed by: INTERNAL MEDICINE

## 2024-03-05 RX ORDER — PRAVASTATIN SODIUM 40 MG
40 TABLET ORAL DAILY
Qty: 90 TABLET | Refills: 1 | Status: SHIPPED | OUTPATIENT
Start: 2024-03-05 | End: 2024-03-05

## 2024-03-05 RX ORDER — PRAVASTATIN SODIUM 40 MG
40 TABLET ORAL DAILY
Qty: 90 TABLET | Refills: 1 | Status: SHIPPED | OUTPATIENT
Start: 2024-03-05

## 2024-03-05 NOTE — PATIENT INSTRUCTIONS
Medicare Preventive Visit Patient Instructions  Thank you for completing your Welcome to Medicare Visit or Medicare Annual Wellness Visit today. Your next wellness visit will be due in one year (3/6/2025).  The screening/preventive services that you may require over the next 5-10 years are detailed below. Some tests may not apply to you based off risk factors and/or age. Screening tests ordered at today's visit but not completed yet may show as past due. Also, please note that scanned in results may not display below.  Preventive Screenings:  Service Recommendations Previous Testing/Comments   Colorectal Cancer Screening  Colonoscopy    Fecal Occult Blood Test (FOBT)/Fecal Immunochemical Test (FIT)  Fecal DNA/Cologuard Test  Flexible Sigmoidoscopy Age: 45-75 years old   Colonoscopy: every 10 years (May be performed more frequently if at higher risk)  OR  FOBT/FIT: every 1 year  OR  Cologuard: every 3 years  OR  Sigmoidoscopy: every 5 years  Screening may be recommended earlier than age 45 if at higher risk for colorectal cancer. Also, an individualized decision between you and your healthcare provider will decide whether screening between the ages of 76-85 would be appropriate. Colonoscopy: 10/13/2020  FOBT/FIT: Not on file  Cologuard: Not on file  Sigmoidoscopy: Not on file    Screening Current     Prostate Cancer Screening Individualized decision between patient and health care provider in men between ages of 55-69   Medicare will cover every 12 months beginning on the day after your 50th birthday PSA: No results in last 5 years     Screening Not Indicated     Hepatitis C Screening Once for adults born between 1945 and 1965  More frequently in patients at high risk for Hepatitis C Hep C Antibody: Not on file    Screening Current   Diabetes Screening 1-2 times per year if you're at risk for diabetes or have pre-diabetes Fasting glucose: 112 mg/dL (5/1/2021)  A1C: 6.4 (10/3/2023)  Screening Not Indicated  History  Diabetes   Cholesterol Screening Once every 5 years if you don't have a lipid disorder. May order more often based on risk factors. Lipid panel: 01/05/2024  Screening Not Indicated  History Lipid Disorder      Other Preventive Screenings Covered by Medicare:  Abdominal Aortic Aneurysm (AAA) Screening: covered once if your at risk. You're considered to be at risk if you have a family history of AAA or a male between the age of 65-75 who smoking at least 100 cigarettes in your lifetime.  Lung Cancer Screening: covers low dose CT scan once per year if you meet all of the following conditions: (1) Age 55-77; (2) No signs or symptoms of lung cancer; (3) Current smoker or have quit smoking within the last 15 years; (4) You have a tobacco smoking history of at least 20 pack years (packs per day x number of years you smoked); (5) You get a written order from a healthcare provider.  Glaucoma Screening: covered annually if you're considered high risk: (1) You have diabetes OR (2) Family history of glaucoma OR (3)  aged 50 and older OR (4)  American aged 65 and older  Osteoporosis Screening: covered every 2 years if you meet one of the following conditions: (1) Have a vertebral abnormality; (2) On glucocorticoid therapy for more than 3 months; (3) Have primary hyperparathyroidism; (4) On osteoporosis medications and need to assess response to drug therapy.  HIV Screening: covered annually if you're between the age of 15-65. Also covered annually if you are younger than 15 and older than 65 with risk factors for HIV infection. For pregnant patients, it is covered up to 3 times per pregnancy.    Immunizations:  Immunization Recommendations   Influenza Vaccine Annual influenza vaccination during flu season is recommended for all persons aged >= 6 months who do not have contraindications   Pneumococcal Vaccine   * Pneumococcal conjugate vaccine = PCV13 (Prevnar 13), PCV15 (Vaxneuvance), PCV20 (Prevnar  20)  * Pneumococcal polysaccharide vaccine = PPSV23 (Pneumovax) Adults 19-63 yo with certain risk factors or if 65+ yo  If never received any pneumonia vaccine: recommend Prevnar 20 (PCV20)  Give PCV20 if previously received 1 dose of PCV13 or PPSV23   Hepatitis B Vaccine 3 dose series if at intermediate or high risk (ex: diabetes, end stage renal disease, liver disease)   Respiratory syncytial virus (RSV) Vaccine - COVERED BY MEDICARE PART D  * RSVPreF3 (Arexvy) CDC recommends that adults 60 years of age and older may receive a single dose of RSV vaccine using shared clinical decision-making (SCDM)   Tetanus (Td) Vaccine - COST NOT COVERED BY MEDICARE PART B Following completion of primary series, a booster dose should be given every 10 years to maintain immunity against tetanus. Td may also be given as tetanus wound prophylaxis.   Tdap Vaccine - COST NOT COVERED BY MEDICARE PART B Recommended at least once for all adults. For pregnant patients, recommended with each pregnancy.   Shingles Vaccine (Shingrix) - COST NOT COVERED BY MEDICARE PART B  2 shot series recommended in those 19 years and older who have or will have weakened immune systems or those 50 years and older     Health Maintenance Due:      Topic Date Due   • Colorectal Cancer Screening  10/13/2025   • Hepatitis C Screening  Completed     Immunizations Due:      Topic Date Due   • COVID-19 Vaccine (5 - 2023-24 season) 09/01/2023     Advance Directives   What are advance directives?  Advance directives are legal documents that state your wishes and plans for medical care. These plans are made ahead of time in case you lose your ability to make decisions for yourself. Advance directives can apply to any medical decision, such as the treatments you want, and if you want to donate organs.   What are the types of advance directives?  There are many types of advance directives, and each state has rules about how to use them. You may choose a combination of  any of the following:  Living will:  This is a written record of the treatment you want. You can also choose which treatments you do not want, which to limit, and which to stop at a certain time. This includes surgery, medicine, IV fluid, and tube feedings.   Durable power of  for healthcare (DPAHC):  This is a written record that states who you want to make healthcare choices for you when you are unable to make them for yourself. This person, called a proxy, is usually a family member or a friend. You may choose more than 1 proxy.  Do not resuscitate (DNR) order:  A DNR order is used in case your heart stops beating or you stop breathing. It is a request not to have certain forms of treatment, such as CPR. A DNR order may be included in other types of advance directives.  Medical directive:  This covers the care that you want if you are in a coma, near death, or unable to make decisions for yourself. You can list the treatments you want for each condition. Treatment may include pain medicine, surgery, blood transfusions, dialysis, IV or tube feedings, and a ventilator (breathing machine).  Values history:  This document has questions about your views, beliefs, and how you feel and think about life. This information can help others choose the care that you would choose.  Why are advance directives important?  An advance directive helps you control your care. Although spoken wishes may be used, it is better to have your wishes written down. Spoken wishes can be misunderstood, or not followed. Treatments may be given even if you do not want them. An advance directive may make it easier for your family to make difficult choices about your care.   Weight Management   Why it is important to manage your weight:  Being overweight increases your risk of health conditions such as heart disease, high blood pressure, type 2 diabetes, and certain types of cancer. It can also increase your risk for osteoarthritis, sleep  apnea, and other respiratory problems. Aim for a slow, steady weight loss. Even a small amount of weight loss can lower your risk of health problems.  How to lose weight safely:  A safe and healthy way to lose weight is to eat fewer calories and get regular exercise. You can lose up about 1 pound a week by decreasing the number of calories you eat by 500 calories each day.   Healthy meal plan for weight management:  A healthy meal plan includes a variety of foods, contains fewer calories, and helps you stay healthy. A healthy meal plan includes the following:  Eat whole-grain foods more often.  A healthy meal plan should contain fiber. Fiber is the part of grains, fruits, and vegetables that is not broken down by your body. Whole-grain foods are healthy and provide extra fiber in your diet. Some examples of whole-grain foods are whole-wheat breads and pastas, oatmeal, brown rice, and bulgur.  Eat a variety of vegetables every day.  Include dark, leafy greens such as spinach, kale, agustin greens, and mustard greens. Eat yellow and orange vegetables such as carrots, sweet potatoes, and winter squash.   Eat a variety of fruits every day.  Choose fresh or canned fruit (canned in its own juice or light syrup) instead of juice. Fruit juice has very little or no fiber.  Eat low-fat dairy foods.  Drink fat-free (skim) milk or 1% milk. Eat fat-free yogurt and low-fat cottage cheese. Try low-fat cheeses such as mozzarella and other reduced-fat cheeses.  Choose meat and other protein foods that are low in fat.  Choose beans or other legumes such as split peas or lentils. Choose fish, skinless poultry (chicken or turkey), or lean cuts of red meat (beef or pork). Before you cook meat or poultry, cut off any visible fat.   Use less fat and oil.  Try baking foods instead of frying them. Add less fat, such as margarine, sour cream, regular salad dressing and mayonnaise to foods. Eat fewer high-fat foods. Some examples of high-fat  foods include french fries, doughnuts, ice cream, and cakes.  Eat fewer sweets.  Limit foods and drinks that are high in sugar. This includes candy, cookies, regular soda, and sweetened drinks.  Exercise:  Exercise at least 30 minutes per day on most days of the week. Some examples of exercise include walking, biking, dancing, and swimming. You can also fit in more physical activity by taking the stairs instead of the elevator or parking farther away from stores. Ask your healthcare provider about the best exercise plan for you.      © Copyright [employer] 2018 Information is for End User's use only and may not be sold, redistributed or otherwise used for commercial purposes. All illustrations and images included in CareNotes® are the copyrighted property of [employer] or [employer]

## 2024-03-05 NOTE — PROGRESS NOTES
Assessment and Plan:     Problem List Items Addressed This Visit          Cardiovascular and Mediastinum    HTN (hypertension) - Primary    Relevant Orders    TSH, 3rd generation       Genitourinary    Bladder cancer (HCC)       Other    Hyperlipidemia    Relevant Medications    pravastatin (PRAVACHOL) 40 mg tablet    Other Relevant Orders    Lipid panel     Other Visit Diagnoses       Type 2 diabetes mellitus without complication, with long-term current use of insulin (HCC)        Stable continue metformin and diabetic diet.    Relevant Medications    metFORMIN (GLUCOPHAGE) 500 mg tablet    Other Relevant Orders    CBC and differential    Comprehensive metabolic panel    Hemoglobin A1C    Urinalysis with microscopic    Lipid panel    Medicare annual wellness visit, subsequent                 1. Hypertension, unspecified type  TSH, 3rd generation    Stable continue current regimen.      2. Type 2 diabetes mellitus without complication, with long-term current use of insulin (HCC)  CBC and differential    Comprehensive metabolic panel    Hemoglobin A1C    Urinalysis with microscopic    Lipid panel    metFORMIN (GLUCOPHAGE) 500 mg tablet    DISCONTINUED: metFORMIN (GLUCOPHAGE) 500 mg tablet    Stable continue metformin and diabetic diet.      3. Hyperlipidemia, unspecified hyperlipidemia type  Lipid panel    pravastatin (PRAVACHOL) 40 mg tablet    DISCONTINUED: pravastatin (PRAVACHOL) 40 mg tablet    Low-cholesterol diet and continue pravastatin 40 mg daily.      4. Medicare annual wellness visit, subsequent        5. Malignant neoplasm of urinary bladder, unspecified site (HCC)      Successfully treated and following with urology.         Preventive health issues were discussed with patient, and age appropriate screening tests were ordered as noted in patient's After Visit Summary.  Personalized health advice and appropriate referrals for health education or preventive services given if needed, as noted in patient's  After Visit Summary.     History of Present Illness:     Patient presents for a Medicare Wellness Visit    HPI   Patient Care Team:  Javier Lisa MD as PCP - General (Internal Medicine)  Valentino Hung MD as PCP - PCP-[facility] ([facility])  [facility]  Raaz Childers MD (Urology)  Vincenzo Smith MD (Otolaryngology)     Review of Systems:     Review of Systems   Constitutional:  Negative for appetite change, chills, fatigue and fever.   HENT:  Negative for sore throat and trouble swallowing.    Eyes:  Negative for redness.   Respiratory:  Negative for shortness of breath.    Cardiovascular:  Negative for chest pain and palpitations.   Gastrointestinal:  Negative for abdominal pain, constipation and diarrhea.   Genitourinary:  Negative for dysuria and hematuria.   Musculoskeletal:  Negative for back pain and neck pain.   Skin:  Negative for rash.   Neurological:  Negative for seizures, weakness and headaches.   Hematological:  Negative for adenopathy.   Psychiatric/Behavioral:  Negative for confusion. The patient is not nervous/anxious.         Problem List:     Patient Active Problem List   Diagnosis    Diabetes mellitus due to underlying condition, controlled, without complication, without long-term current use of insulin (HCC)    Asthma    Sarcoid    Bladder cancer (HCC)    Sleep apnea with use of continuous positive airway pressure (CPAP)    HTN (hypertension)    Hyperlipidemia    Rhinophyma    History of bladder cancer    Type 2 diabetes mellitus with other skin complications (HCC)    Atherosclerosis of aorta (HCC)      Past Medical and Surgical History:     Past Medical History:   Diagnosis Date    Asthma     Bladder cancer (HCC)     Cancer (HCC)     Bladder Cancer Hx    Constipation     CPAP (continuous positive airway pressure) dependence     stopped using    Diabetes mellitus (HCC)     Diabetes mellitus due to underlying condition, controlled, without complication,  without long-term current use of insulin (HCC)     HTN (hypertension)     Hyperlipidemia     Hyperlipidemia     Hypertension     Lung disease     Non-seasonal allergic rhinitis     Obstructive sleep apnea (adult) (pediatric)     Other asthma     Rhinophyma     Sleep apnea with use of continuous positive airway pressure (CPAP)      Past Surgical History:   Procedure Laterality Date    APPENDECTOMY      BLADDER SURGERY      bladder cancer    CECECTOMY LAPAROSCOPIC      COLONOSCOPY  02/10/2016    Abnormal     CYSTOSCOPY      VA NASAL/SINUS NDSC W/TOTAL ETHOIDECTOMY N/A 12/11/2017    Procedure: ENDOSCOPIC SINUS SURGERY; EXCISION RHINOPHYMA;  Surgeon: Vincenzo Smith MD;  Location: [facility];  Service: ENT    VA SEPTOPLASTY/SUBMUCOUS RESECJ W/WO CARTILAGE GRF N/A 12/11/2017    Procedure: SEPTOPLASTY; TURBINOPLASTY;  Surgeon: Vincenzo Smith MD;  Location: [facility];  Service: ENT    VA SEPTOPLASTY/SUBMUCOUS RESECJ W/WO CARTILAGE GRF N/A 5/28/2021    Procedure: RESECTION OF RHINOPHYMA RECURRENT EXCISION OF RHINOPHYMA;  Surgeon: Vincenzo Smith MD;  Location: [facility];  Service: ENT    PROSTATE SURGERY      prostate shaved       Family History:     Family History   Problem Relation Age of Onset    Diabetes Mother     Heart disease Mother     Diabetes Father     Heart disease Father     Dementia Sister         in [facility]    Coronary artery disease Brother     Hypertension Family     Lung cancer Brother       Social History:     Social History     Socioeconomic History    Marital status: /Civil Union     Spouse name: None    Number of children: None    Years of education: None    Highest education level: None   Occupational History    Occupation: Retired -    Tobacco Use    Smoking status: Never    Smokeless tobacco: Never   Vaping Use    Vaping status: Never Used   Substance and Sexual Activity    Alcohol use: No    Drug use: No    Sexual activity: None   Other Topics Concern    None   Social History Narrative     Exercise: No      Social Determinants of Health     Financial Resource Strain: Low Risk  (3/5/2024)    Overall Financial Resource Strain (CARDIA)     Difficulty of Paying Living Expenses: Not hard at all   Food Insecurity: Not on file   Transportation Needs: No Transportation Needs (3/5/2024)    PRAPARE - Transportation     Lack of Transportation (Medical): No     Lack of Transportation (Non-Medical): No   Physical Activity: Not on file   Stress: Not on file   Social Connections: Not on file   Intimate Partner Violence: Not on file   Housing Stability: Not on file      Medications and Allergies:     Current Outpatient Medications   Medication Sig Dispense Refill    acetaminophen (TYLENOL) 500 mg tablet Take 1 tablet (500 mg total) by mouth every 6 (six) hours as needed for mild pain or moderate pain 40 tablet 0    albuterol (2.5 mg/3 mL) 0.083 % nebulizer solution Take 3 mL (2.5 mg total) by nebulization every 6 (six) hours as needed for wheezing or shortness of breath 360 mL 0    amLODIPine (NORVASC) 5 mg tablet Take 1 tablet (5 mg total) by mouth daily 90 tablet 1    ciclopirox (LOPROX) 0.77 % SUSP Apply 1 Application topically 2 (two) times a day 60 mL 3    fluticasone (FLONASE) 50 mcg/act nasal spray 2 sprays into each nostril daily 16 g 5    Fluticasone-Salmeterol (Advair) 250-50 mcg/dose inhaler Inhale 1 puff 2 (two) times a day Rinse mouth after use. 180 blister 1    guaifenesin-codeine (GUAIFENESIN AC) 100-10 MG/5ML liquid Take 5 mL by mouth 3 (three) times a day as needed for cough 200 mL 0    latanoprost (XALATAN) 0.005 % ophthalmic solution       lisinopril (ZESTRIL) 40 mg tablet Take 1 tablet (40 mg total) by mouth daily 90 tablet 1    metFORMIN (GLUCOPHAGE) 500 mg tablet Take 1 tablet (500 mg total) by mouth daily 90 tablet 1    pravastatin (PRAVACHOL) 40 mg tablet Take 1 tablet (40 mg total) by mouth daily 90 tablet 1    psyllium (METAMUCIL,KONSYL) 30.9 % Take 7.5 g by mouth daily at bedtime 500 g 2     chlorhexidine (PERIDEX) 0.12 % solution  (Patient not taking: Reported on 6/27/2023)      magnesium hydroxide (MILK OF MAGNESIA) 400 mg/5 mL oral suspension Take 15 mL by mouth daily at bedtime (Patient not taking: Reported on 6/27/2023) 500 mL 1     No current facility-administered medications for this visit.     Allergies   Allergen Reactions    Nyquil Multi-Symptom [Pseudoeph-Doxylamine-Dm-Apap] Shortness Of Breath     Caused an asthma attack    Penicillins      Patch test positive in childhood  Rash      Immunizations:     Immunization History   Administered Date(s) Administered    BCG 08/31/2015, 09/08/2015, 03/04/2016, 03/11/2016, 03/18/2016    COVID-19 MODERNA VACC 0.5 ML IM 04/27/2021, 05/26/2021, 12/14/2021    COVID-19 Moderna Vac BIVALENT 12 Yr+ IM 0.5 ML 11/30/2022    INFLUENZA 11/08/2013, 01/09/2015, 09/15/2020    Influenza, Seasonal Vaccine, Quadrivalent, Adjuvanted, .5e 10/06/2022    Influenza, high dose seasonal 0.7 mL 10/12/2021, 09/26/2023    Influenza, seasonal, injectable 10/11/2012    Influenza, seasonal, injectable, preservative free 09/18/2017    Pneumococcal Conjugate 13-Valent 04/01/2012    Pneumococcal Polysaccharide PPV23 04/09/2013, 05/16/2017    Tdap 05/01/2013, 06/17/2023    Tuberculin Skin Test-PPD Intradermal 11/19/2013    Zoster Vaccine Recombinant 08/25/2020, 08/25/2020, 12/01/2020    influenza, trivalent, adjuvanted 09/15/2020      Health Maintenance:         Topic Date Due    Colorectal Cancer Screening  10/13/2025    Hepatitis C Screening  Completed         Topic Date Due    COVID-19 Vaccine (5 - 2023-24 season) 09/01/2023      Medicare Screening Tests and Risk Assessments:     Anirudh is here for his Subsequent Wellness visit. Last Medicare Wellness visit information reviewed, patient interviewed and updates made to the record today.      Health Risk Assessment:   Patient rates overall health as very good. Patient feels that their physical health rating is same. Patient is  satisfied with their life. Eyesight was rated as same. Hearing was rated as same. Patient feels that their emotional and mental health rating is same. Patients states they are never, rarely angry. Patient states they are sometimes unusually tired/fatigued. Pain experienced in the last 7 days has been none. Patient states that he has experienced no weight loss or gain in last 6 months.     Depression Screening:   PHQ-2 Score: 0      Fall Risk Screening:   In the past year, patient has experienced: no history of falling in past year      Home Safety:  Patient does not have trouble with stairs inside or outside of their home. Patient has working smoke alarms and has working carbon monoxide detector. Home safety hazards include: none.     Nutrition:   Current diet is Regular.     Medications:   Patient is currently taking over-the-counter supplements. OTC medications include: see medication list. Patient is able to manage medications.     Activities of Daily Living (ADLs)/Instrumental Activities of Daily Living (IADLs):   Walk and transfer into and out of bed and chair?: Yes  Dress and groom yourself?: Yes    Bathe or shower yourself?: Yes    Feed yourself? Yes  Do your laundry/housekeeping?: Yes  Manage your money, pay your bills and track your expenses?: Yes  Make your own meals?: Yes    Do your own shopping?: Yes    Previous Hospitalizations:   Any hospitalizations or ED visits within the last 12 months?: Yes    How many hospitalizations have you had in the last year?: 1-2    Advance Care Planning:   Living will: No    Durable POA for healthcare: Yes    Advanced directive: Yes      PREVENTIVE SCREENINGS      Cardiovascular Screening:    General: Screening Not Indicated and History Lipid Disorder      Diabetes Screening:     General: Screening Not Indicated and History Diabetes      Colorectal Cancer Screening:     General: Screening Current      Prostate Cancer Screening:    General: Screening Not Indicated       "Abdominal Aortic Aneurysm (AAA) Screening:    Risk factors include: age between 65-76 yo        Lung Cancer Screening:     General: Screening Not Indicated      Hepatitis C Screening:    General: Screening Current    Screening, Brief Intervention, and Referral to Treatment (SBIRT)    Screening  Typical number of drinks in a day: 0  Typical number of drinks in a week: 0  Interpretation: Low risk drinking behavior.    Single Item Drug Screening:  How often have you used an illegal drug (including marijuana) or a prescription medication for non-medical reasons in the past year? never    Single Item Drug Screen Score: 0  Interpretation: Negative screen for possible drug use disorder    Other Counseling Topics:   Car/seat belt/driving safety, skin self-exam, sunscreen and calcium and vitamin D intake and regular weightbearing exercise.     No results found.     Physical Exam:     /84   Pulse 72   Temp 97.7 °F (36.5 °C) (Temporal)   Ht 5' 5\" (1.651 m)   Wt 83.5 kg (184 lb)   SpO2 97%   BMI 30.62 kg/m²     Physical Exam  Vitals and nursing note reviewed.   Constitutional:       General: He is not in acute distress.     Appearance: He is well-developed.   HENT:      Head: Normocephalic and atraumatic.      Mouth/Throat:      Mouth: Mucous membranes are moist.   Eyes:      Conjunctiva/sclera: Conjunctivae normal.   Cardiovascular:      Rate and Rhythm: Normal rate and regular rhythm.      Heart sounds: No murmur heard.  Pulmonary:      Effort: Pulmonary effort is normal. No respiratory distress.      Breath sounds: Normal breath sounds.   Abdominal:      Palpations: Abdomen is soft.      Tenderness: There is no abdominal tenderness.   Musculoskeletal:         General: No swelling.      Cervical back: Normal range of motion and neck supple.   Skin:     General: Skin is warm and dry.      Capillary Refill: Capillary refill takes less than 2 seconds.   Neurological:      General: No focal deficit present.      " Mental Status: He is alert.   Psychiatric:         Mood and Affect: Mood normal.          Javier Lisa MD

## 2024-05-23 ENCOUNTER — RA CDI HCC (OUTPATIENT)
Dept: OTHER | Facility: HOSPITAL | Age: 74
End: 2024-05-23

## 2024-05-24 ENCOUNTER — RA CDI HCC (OUTPATIENT)
Dept: OTHER | Facility: HOSPITAL | Age: 74
End: 2024-05-24

## 2024-06-03 LAB
CHOLEST SERPL-MCNC: 181 MG/DL
CHOLEST/HDLC SERPL: 4.5 {RATIO}
HDLC SERPL-MCNC: 40 MG/DL (ref 23–92)
LDLC SERPL CALC-MCNC: 114 MG/DL
NONHDLC SERPL-MCNC: 141 MG/DL
TRIGL SERPL-MCNC: 136 MG/DL
TSH SERPL-ACNC: 6.25 UIU/ML (ref 0.45–5.33)

## 2024-06-04 ENCOUNTER — OFFICE VISIT (OUTPATIENT)
Dept: INTERNAL MEDICINE CLINIC | Facility: CLINIC | Age: 74
End: 2024-06-04
Payer: COMMERCIAL

## 2024-06-04 VITALS
HEART RATE: 83 BPM | SYSTOLIC BLOOD PRESSURE: 130 MMHG | TEMPERATURE: 98 F | OXYGEN SATURATION: 95 % | HEIGHT: 65 IN | DIASTOLIC BLOOD PRESSURE: 73 MMHG | WEIGHT: 190 LBS | BODY MASS INDEX: 31.65 KG/M2

## 2024-06-04 DIAGNOSIS — Z79.4 TYPE 2 DIABETES MELLITUS WITHOUT COMPLICATION, WITH LONG-TERM CURRENT USE OF INSULIN (HCC): ICD-10-CM

## 2024-06-04 DIAGNOSIS — I10 HYPERTENSION, UNSPECIFIED TYPE: ICD-10-CM

## 2024-06-04 DIAGNOSIS — E03.9 ACQUIRED HYPOTHYROIDISM: Primary | ICD-10-CM

## 2024-06-04 DIAGNOSIS — K59.09 OTHER CONSTIPATION: ICD-10-CM

## 2024-06-04 DIAGNOSIS — E11.9 TYPE 2 DIABETES MELLITUS WITHOUT COMPLICATION, WITH LONG-TERM CURRENT USE OF INSULIN (HCC): ICD-10-CM

## 2024-06-04 DIAGNOSIS — E78.5 HYPERLIPIDEMIA, UNSPECIFIED HYPERLIPIDEMIA TYPE: ICD-10-CM

## 2024-06-04 DIAGNOSIS — D86.9 SARCOID: ICD-10-CM

## 2024-06-04 PROCEDURE — 99214 OFFICE O/P EST MOD 30 MIN: CPT | Performed by: INTERNAL MEDICINE

## 2024-06-04 PROCEDURE — G2211 COMPLEX E/M VISIT ADD ON: HCPCS | Performed by: INTERNAL MEDICINE

## 2024-06-04 RX ORDER — PRAVASTATIN SODIUM 40 MG
40 TABLET ORAL DAILY
Qty: 90 TABLET | Refills: 1 | Status: SHIPPED | OUTPATIENT
Start: 2024-06-04

## 2024-06-04 RX ORDER — LEVOTHYROXINE SODIUM 0.05 MG/1
50 TABLET ORAL DAILY
Qty: 90 TABLET | Refills: 1 | Status: SHIPPED | OUTPATIENT
Start: 2024-06-04

## 2024-06-04 RX ORDER — DOCUSATE SODIUM 100 MG/1
100 CAPSULE, LIQUID FILLED ORAL 2 TIMES DAILY
Qty: 100 CAPSULE | Refills: 3 | Status: SHIPPED | OUTPATIENT
Start: 2024-06-04

## 2024-06-04 NOTE — PROGRESS NOTES
Assessment/Plan:           1. Acquired hypothyroidism  Comments:  Start levothyroxine 50 mcg daily.  Orders:  -     levothyroxine (Levoxyl) 50 mcg tablet; Take 1 tablet (50 mcg total) by mouth daily  -     T4, free; Future; Expected date: 08/26/2024  -     TSH, 3rd generation; Future; Expected date: 08/26/2024  2. Hypertension, unspecified type  Comments:  Stable continue current regimen.  3. Type 2 diabetes mellitus without complication, with long-term current use of insulin (HCC)  -     metFORMIN (GLUCOPHAGE) 500 mg tablet; Take 1 tablet (500 mg total) by mouth daily  4. Hyperlipidemia, unspecified hyperlipidemia type  Comments:  Low-cholesterol diet and continue pravastatin 40 mg daily.  Orders:  -     pravastatin (PRAVACHOL) 40 mg tablet; Take 1 tablet (40 mg total) by mouth daily  5. Other constipation  Comments:  Metamucil and stool softener advised.  Orders:  -     psyllium (METAMUCIL,KONSYL) 30.9 %; Take 7.5 g by mouth daily at bedtime  -     docusate sodium (COLACE) 100 mg capsule; Take 1 capsule (100 mg total) by mouth 2 (two) times a day  6. Sarcoid         There are no diagnoses linked to this encounter.     No problem-specific Assessment & Plan notes found for this encounter.           Subjective:      Patient ID: Anirudh Hernadez is a 73 y.o. male.    HPI    The following portions of the patient's history were reviewed and updated as appropriate: He  has a past medical history of Asthma, Bladder cancer (HCC), Cancer (HCC), Constipation, CPAP (continuous positive airway pressure) dependence, Diabetes mellitus (HCC), Diabetes mellitus due to underlying condition, controlled, without complication, without long-term current use of insulin (HCC), HTN (hypertension), Hyperlipidemia, Hyperlipidemia, Hypertension, Lung disease, Non-seasonal allergic rhinitis, Obstructive sleep apnea (adult) (pediatric), Other asthma, Rhinophyma, Sarcoid (10/10/2017), and Sleep apnea with use of continuous positive airway  pressure (CPAP).  He   Patient Active Problem List    Diagnosis Date Noted    Atherosclerosis of aorta (HCC) 10/03/2023    Type 2 diabetes mellitus with other skin complications (HCC) 02/28/2023    History of bladder cancer 07/26/2022    Sleep apnea with use of continuous positive airway pressure (CPAP)     HTN (hypertension)     Hyperlipidemia     Rhinophyma     Diabetes mellitus due to underlying condition, controlled, without complication, without long-term current use of insulin (HCC)     Asthma     Sarcoid 10/10/2017     He  has a past surgical history that includes Appendectomy; Cystoscopy; pr septoplasty/submucous resecj w/wo cartilage grf (N/A, 12/11/2017); pr nasal/sinus ndsc w/total ethoidectomy (N/A, 12/11/2017); Bladder surgery; Prostate surgery; Colonoscopy (02/10/2016); CECECTOMY LAPAROSCOPIC; and pr septoplasty/submucous resecj w/wo cartilage grf (N/A, 5/28/2021).  His family history includes Coronary artery disease in his brother; Dementia in his sister; Diabetes in his father and mother; Heart disease in his father and mother; Hypertension in his family; Lung cancer in his brother.  He  reports that he has never smoked. He has never used smokeless tobacco. He reports that he does not drink alcohol and does not use drugs.  Current Outpatient Medications   Medication Sig Dispense Refill    acetaminophen (TYLENOL) 500 mg tablet Take 1 tablet (500 mg total) by mouth every 6 (six) hours as needed for mild pain or moderate pain 40 tablet 0    amLODIPine (NORVASC) 5 mg tablet Take 1 tablet (5 mg total) by mouth daily 90 tablet 1    ciclopirox (LOPROX) 0.77 % SUSP Apply 1 Application topically 2 (two) times a day 60 mL 3    docusate sodium (COLACE) 100 mg capsule Take 1 capsule (100 mg total) by mouth 2 (two) times a day 100 capsule 3    fluticasone (FLONASE) 50 mcg/act nasal spray 2 sprays into each nostril daily 16 g 5    Fluticasone-Salmeterol (Advair) 250-50 mcg/dose inhaler Inhale 1 puff 2 (two) times  a day Rinse mouth after use. 180 blister 1    guaifenesin-codeine (GUAIFENESIN AC) 100-10 MG/5ML liquid Take 5 mL by mouth 3 (three) times a day as needed for cough 200 mL 0    latanoprost (XALATAN) 0.005 % ophthalmic solution       levothyroxine (Levoxyl) 50 mcg tablet Take 1 tablet (50 mcg total) by mouth daily 90 tablet 1    lisinopril (ZESTRIL) 40 mg tablet Take 1 tablet (40 mg total) by mouth daily 90 tablet 1    metFORMIN (GLUCOPHAGE) 500 mg tablet Take 1 tablet (500 mg total) by mouth daily 90 tablet 1    pravastatin (PRAVACHOL) 40 mg tablet Take 1 tablet (40 mg total) by mouth daily 90 tablet 1    psyllium (METAMUCIL,KONSYL) 30.9 % Take 7.5 g by mouth daily at bedtime 500 g 2    chlorhexidine (PERIDEX) 0.12 % solution  (Patient not taking: Reported on 6/27/2023)      fluticasone (FLONASE) 50 mcg/act nasal spray 2 sprays into each nostril daily (Patient not taking: Reported on 6/4/2024) 16 g 5    magnesium hydroxide (MILK OF MAGNESIA) 400 mg/5 mL oral suspension Take 15 mL by mouth daily at bedtime (Patient not taking: Reported on 6/27/2023) 500 mL 1     No current facility-administered medications for this visit.     Current Outpatient Medications on File Prior to Visit   Medication Sig    acetaminophen (TYLENOL) 500 mg tablet Take 1 tablet (500 mg total) by mouth every 6 (six) hours as needed for mild pain or moderate pain    amLODIPine (NORVASC) 5 mg tablet Take 1 tablet (5 mg total) by mouth daily    ciclopirox (LOPROX) 0.77 % SUSP Apply 1 Application topically 2 (two) times a day    fluticasone (FLONASE) 50 mcg/act nasal spray 2 sprays into each nostril daily    Fluticasone-Salmeterol (Advair) 250-50 mcg/dose inhaler Inhale 1 puff 2 (two) times a day Rinse mouth after use.    guaifenesin-codeine (GUAIFENESIN AC) 100-10 MG/5ML liquid Take 5 mL by mouth 3 (three) times a day as needed for cough    latanoprost (XALATAN) 0.005 % ophthalmic solution     lisinopril (ZESTRIL) 40 mg tablet Take 1 tablet (40 mg  "total) by mouth daily    [DISCONTINUED] metFORMIN (GLUCOPHAGE) 500 mg tablet Take 1 tablet (500 mg total) by mouth daily    [DISCONTINUED] pravastatin (PRAVACHOL) 40 mg tablet Take 1 tablet (40 mg total) by mouth daily    [DISCONTINUED] psyllium (METAMUCIL,KONSYL) 30.9 % Take 7.5 g by mouth daily at bedtime    chlorhexidine (PERIDEX) 0.12 % solution  (Patient not taking: Reported on 6/27/2023)    fluticasone (FLONASE) 50 mcg/act nasal spray 2 sprays into each nostril daily (Patient not taking: Reported on 6/4/2024)    magnesium hydroxide (MILK OF MAGNESIA) 400 mg/5 mL oral suspension Take 15 mL by mouth daily at bedtime (Patient not taking: Reported on 6/27/2023)     No current facility-administered medications on file prior to visit.     Medications Discontinued During This Encounter   Medication Reason    metFORMIN (GLUCOPHAGE) 500 mg tablet Reorder    pravastatin (PRAVACHOL) 40 mg tablet Reorder    psyllium (METAMUCIL,KONSYL) 30.9 % Reorder      He is allergic to nyquil multi-symptom [pseudoeph-doxylamine-dm-apap] and penicillins..    Review of Systems   Constitutional:  Negative for appetite change, chills, fatigue and fever.   HENT:  Negative for sore throat and trouble swallowing.    Eyes:  Negative for redness.   Respiratory:  Negative for shortness of breath.    Cardiovascular:  Negative for chest pain and palpitations.   Gastrointestinal:  Positive for constipation. Negative for abdominal pain and diarrhea.   Genitourinary:  Negative for dysuria and hematuria.   Musculoskeletal:  Negative for back pain and neck pain.   Skin:  Negative for rash.   Neurological:  Negative for seizures, weakness and headaches.   Hematological:  Negative for adenopathy.   Psychiatric/Behavioral:  Negative for confusion. The patient is not nervous/anxious.          Objective:      /73 (BP Location: Left arm, Patient Position: Sitting, Cuff Size: Standard)   Pulse 83   Temp 98 °F (36.7 °C) (Temporal)   Ht 5' 5\" (1.651 m) "   Wt 86.2 kg (190 lb)   SpO2 95%   BMI 31.62 kg/m²     Results Reviewed       None            Recent Results (from the past 1344 hour(s))   Lipid panel    Collection Time: 06/03/24  8:18 AM   Result Value Ref Range    Cholesterol, Total 181 <200 mg/dL    Triglycerides 136 <150 mg/dL    HDL Cholesterol 40 23 - 92 mg/dL    Non HDL Chol. (LDL+VLDL) 141 <160 mg/dL    LDL Calculated 114 <130 mg/dL    Chol HDLC Ratio 4.5    TSH, 3rd generation    Collection Time: 06/03/24  8:18 AM   Result Value Ref Range    TSH 6.25 (H) 0.45 - 5.33 uIU/mL   TSH, 3RD GENERATION    Collection Time: 06/03/24  8:18 AM   Result Value Ref Range    TSH 6.25 (H) 0.45 - 5.33 uIU/mL        Physical Exam  Constitutional:       General: He is not in acute distress.     Appearance: Normal appearance.   HENT:      Head: Normocephalic and atraumatic.      Nose: Nose normal.      Mouth/Throat:      Mouth: Mucous membranes are moist.   Eyes:      Extraocular Movements: Extraocular movements intact.      Pupils: Pupils are equal, round, and reactive to light.   Cardiovascular:      Rate and Rhythm: Normal rate and regular rhythm.      Pulses: Normal pulses.      Heart sounds: Normal heart sounds. No murmur heard.     No friction rub.   Pulmonary:      Effort: Pulmonary effort is normal. No respiratory distress.      Breath sounds: Normal breath sounds. No wheezing.   Abdominal:      General: Abdomen is flat. Bowel sounds are normal. There is no distension.      Palpations: Abdomen is soft. There is no mass.      Tenderness: There is no abdominal tenderness. There is no guarding.   Musculoskeletal:         General: Normal range of motion.      Cervical back: Normal range of motion.   Neurological:      General: No focal deficit present.      Mental Status: He is alert and oriented to person, place, and time. Mental status is at baseline.      Cranial Nerves: No cranial nerve deficit.   Psychiatric:         Mood and Affect: Mood normal.         Behavior:  Behavior normal.

## 2024-06-04 NOTE — PROGRESS NOTES
Diabetic Foot Exam    Patient's shoes and socks removed.    Right Foot/Ankle   Right Foot Inspection  Skin Exam: skin normal and skin intact. No dry skin, no warmth, no callus, no erythema, no maceration, no abnormal color, no pre-ulcer, no ulcer and no callus.     Toe Exam: ROM and strength within normal limits.     Sensory   Monofilament testing: intact    Vascular  The right DP pulse is 1+. The right PT pulse is 1+.     Left Foot/Ankle  Left Foot Inspection  Skin Exam: skin normal, skin intact, dry skin and callus. No warmth, no erythema, no maceration, normal color, no pre-ulcer and no ulcer.     Toe Exam: ROM and strength within normal limits.     Sensory   Monofilament testing: intact    Vascular  The left DP pulse is 1+. The left PT pulse is 1+.     Assign Risk Category  No deformity present  No loss of protective sensation  No weak pulses  Risk: 0

## 2024-07-19 DIAGNOSIS — I10 HYPERTENSION, UNSPECIFIED TYPE: ICD-10-CM

## 2024-07-19 RX ORDER — AMLODIPINE BESYLATE 5 MG/1
5 TABLET ORAL DAILY
Qty: 100 TABLET | Refills: 1 | Status: SHIPPED | OUTPATIENT
Start: 2024-07-19

## 2024-07-30 DIAGNOSIS — J45.40 MODERATE PERSISTENT ASTHMA WITHOUT COMPLICATION: ICD-10-CM

## 2024-07-30 DIAGNOSIS — I10 HYPERTENSION, UNSPECIFIED TYPE: ICD-10-CM

## 2024-07-30 RX ORDER — FLUTICASONE PROPIONATE AND SALMETEROL 250; 50 UG/1; UG/1
1 POWDER RESPIRATORY (INHALATION) 2 TIMES DAILY
Qty: 180 BLISTER | Refills: 1 | Status: SHIPPED | OUTPATIENT
Start: 2024-07-30 | End: 2025-01-26

## 2024-07-30 RX ORDER — LISINOPRIL 40 MG/1
40 TABLET ORAL DAILY
Qty: 90 TABLET | Refills: 1 | Status: SHIPPED | OUTPATIENT
Start: 2024-07-30

## 2024-08-26 LAB
T4 FREE SERPL-MCNC: 1.02 NG/DL (ref 0.61–1.12)
TSH SERPL-ACNC: 1.86 UIU/ML (ref 0.45–5.33)

## 2024-09-04 ENCOUNTER — RA CDI HCC (OUTPATIENT)
Dept: OTHER | Facility: HOSPITAL | Age: 74
End: 2024-09-04

## 2024-09-05 ENCOUNTER — RA CDI HCC (OUTPATIENT)
Dept: OTHER | Facility: HOSPITAL | Age: 74
End: 2024-09-05

## 2024-09-05 NOTE — PROGRESS NOTES
HCC coding opportunities          Chart Reviewed number of suggestions sent to Provider:   E11.36, E11.22    Patient currently has two diabetes diagnoses on their Problem list:   Type 2 diabetes mellitus   Diabetes mellitus due to underlying condition  Please clarify which of these diagnoses is the more accurate current one so as to avoid future confusion         Patients Insurance     Medicare Insurance: [employer]age

## 2024-09-10 ENCOUNTER — OFFICE VISIT (OUTPATIENT)
Dept: INTERNAL MEDICINE CLINIC | Facility: CLINIC | Age: 74
End: 2024-09-10
Payer: COMMERCIAL

## 2024-09-10 VITALS
OXYGEN SATURATION: 95 % | BODY MASS INDEX: 31.65 KG/M2 | HEART RATE: 78 BPM | HEIGHT: 65 IN | TEMPERATURE: 98 F | SYSTOLIC BLOOD PRESSURE: 142 MMHG | WEIGHT: 190 LBS | DIASTOLIC BLOOD PRESSURE: 82 MMHG

## 2024-09-10 DIAGNOSIS — E11.9 TYPE 2 DIABETES MELLITUS WITHOUT COMPLICATION, WITH LONG-TERM CURRENT USE OF INSULIN (HCC): ICD-10-CM

## 2024-09-10 DIAGNOSIS — E03.9 ACQUIRED HYPOTHYROIDISM: ICD-10-CM

## 2024-09-10 DIAGNOSIS — L30.9 DERMATITIS: ICD-10-CM

## 2024-09-10 DIAGNOSIS — Z79.4 TYPE 2 DIABETES MELLITUS WITHOUT COMPLICATION, WITH LONG-TERM CURRENT USE OF INSULIN (HCC): ICD-10-CM

## 2024-09-10 DIAGNOSIS — L98.9 PAINFUL SKIN LESION: ICD-10-CM

## 2024-09-10 DIAGNOSIS — J45.40 MODERATE PERSISTENT ASTHMA WITHOUT COMPLICATION: Primary | ICD-10-CM

## 2024-09-10 DIAGNOSIS — E78.5 HYPERLIPIDEMIA, UNSPECIFIED HYPERLIPIDEMIA TYPE: ICD-10-CM

## 2024-09-10 LAB — SL AMB POCT HEMOGLOBIN AIC: 7 (ref ?–6.5)

## 2024-09-10 PROCEDURE — 99215 OFFICE O/P EST HI 40 MIN: CPT | Performed by: INTERNAL MEDICINE

## 2024-09-10 PROCEDURE — 83036 HEMOGLOBIN GLYCOSYLATED A1C: CPT | Performed by: INTERNAL MEDICINE

## 2024-09-10 RX ORDER — BENZOCAINE/MENTHOL 6 MG-10 MG
LOZENGE MUCOUS MEMBRANE 4 TIMES DAILY PRN
Qty: 20 G | Refills: 1 | Status: SHIPPED | OUTPATIENT
Start: 2024-09-10

## 2024-09-10 NOTE — PROGRESS NOTES
Assessment/Plan:           1. Moderate persistent asthma without complication  Comments:  Stable continue Advair and albuterol.  2. Acquired hypothyroidism  Comments:  Doing better.  Continue levothyroxine 50 mcg's daily.  Orders:  -     CBC and differential; Future; Expected date: 11/25/2024  -     Comprehensive metabolic panel; Future; Expected date: 11/25/2024  -     Urinalysis with microscopic; Future; Expected date: 11/25/2024  -     TSH, 3rd generation; Future; Expected date: 11/25/2024  3. Type 2 diabetes mellitus without complication, with long-term current use of insulin ([facility])  Comments:  A1c is at 7.  Continue metformin 500 mg daily.  Orders:  -     POCT hemoglobin A1c  4. Hyperlipidemia, unspecified hyperlipidemia type  Comments:  Continue pravastatin 40 mg daily.  Orders:  -     Lipid panel; Future; Expected date: 11/25/2024  5. Painful skin lesion  -     Ambulatory Referral to General Surgery; Future  6. Dermatitis  Comments:  Hydrocortisone and zinc oxide advised  Orders:  -     hydrocortisone 1 % cream; Apply topically 4 (four) times a day as needed for irritation  -     zinc oxide (BALMEX) 11.3 % cream; Apply topically 2 (two) times a day as needed (irritation)         1. Moderate persistent asthma without complication      2. Acquired hypothyroidism      3. Type 2 diabetes mellitus without complication, with long-term current use of insulin (HCC)      4. Hyperlipidemia, unspecified hyperlipidemia type      5. Painful skin lesion    - Ambulatory Referral to General Surgery; Future       No problem-specific Assessment & Plan notes found for this encounter.           Subjective:      Patient ID: Anirudh Hernadez is a 74 y.o. male.    HPI    The following portions of the patient's history were reviewed and updated as appropriate: He  has a past medical history of Asthma, Bladder cancer (HCC), Cancer (HCC), Constipation, CPAP (continuous positive airway pressure) dependence, Diabetes mellitus ([facility]),  Diabetes mellitus due to underlying condition, controlled, without complication, without long-term current use of insulin ([facility]), HTN (hypertension), Hyperlipidemia, Hyperlipidemia, Hypertension, Lung disease, Non-seasonal allergic rhinitis, Obstructive sleep apnea (adult) (pediatric), Other asthma, Rhinophyma, Sarcoid (10/10/2017), and Sleep apnea with use of continuous positive airway pressure (CPAP).  He   Patient Active Problem List    Diagnosis Date Noted    Atherosclerosis of aorta ([facility]) 10/03/2023    Type 2 diabetes mellitus with other skin complications ([facility]) 02/28/2023    History of bladder cancer 07/26/2022    Sleep apnea with use of continuous positive airway pressure (CPAP)     HTN (hypertension)     Hyperlipidemia     Rhinophyma     Diabetes mellitus due to underlying condition, controlled, without complication, without long-term current use of insulin ([facility])     Asthma     Sarcoid 10/10/2017     He  has a past surgical history that includes Appendectomy; Cystoscopy; pr septoplasty/submucous resecj w/wo cartilage grf (N/A, 12/11/2017); pr nasal/sinus ndsc w/total ethoidectomy (N/A, 12/11/2017); Bladder surgery; Prostate surgery; Colonoscopy (02/10/2016); CECECTOMY LAPAROSCOPIC; and pr septoplasty/submucous resecj w/wo cartilage grf (N/A, 5/28/2021).  His family history includes Coronary artery disease in his brother; Dementia in his sister; Diabetes in his father and mother; Heart disease in his father and mother; Hypertension in his family; Lung cancer in his brother.  He  reports that he has never smoked. He has never used smokeless tobacco. He reports that he does not drink alcohol and does not use drugs.  Current Outpatient Medications   Medication Sig Dispense Refill    acetaminophen (TYLENOL) 500 mg tablet Take 1 tablet (500 mg total) by mouth every 6 (six) hours as needed for mild pain or moderate pain 40 tablet 0    amLODIPine (NORVASC) 5 mg tablet Take 1 tablet (5 mg total) by mouth daily 100  tablet 1    ciclopirox (LOPROX) 0.77 % SUSP Apply 1 Application topically 2 (two) times a day 60 mL 3    docusate sodium (COLACE) 100 mg capsule Take 1 capsule (100 mg total) by mouth 2 (two) times a day 100 capsule 3    fluticasone (FLONASE) 50 mcg/act nasal spray 2 sprays into each nostril daily 16 g 5    Fluticasone-Salmeterol (Advair) 250-50 mcg/dose inhaler Inhale 1 puff 2 (two) times a day Rinse mouth after use. 180 blister 1    hydrocortisone 1 % cream Apply topically 4 (four) times a day as needed for irritation 20 g 1    latanoprost (XALATAN) 0.005 % ophthalmic solution       levothyroxine (Levoxyl) 50 mcg tablet Take 1 tablet (50 mcg total) by mouth daily 90 tablet 1    lisinopril (ZESTRIL) 40 mg tablet Take 1 tablet (40 mg total) by mouth daily 90 tablet 1    metFORMIN (GLUCOPHAGE) 500 mg tablet Take 1 tablet (500 mg total) by mouth daily 90 tablet 1    pravastatin (PRAVACHOL) 40 mg tablet Take 1 tablet (40 mg total) by mouth daily 90 tablet 1    psyllium (METAMUCIL,KONSYL) 30.9 % Take 7.5 g by mouth daily at bedtime 500 g 2    zinc oxide (BALMEX) 11.3 % cream Apply topically 2 (two) times a day as needed (irritation) 20 g 0    chlorhexidine (PERIDEX) 0.12 % solution  (Patient not taking: Reported on 6/27/2023)      fluticasone (FLONASE) 50 mcg/act nasal spray 2 sprays into each nostril daily (Patient not taking: Reported on 6/4/2024) 16 g 5    magnesium hydroxide (MILK OF MAGNESIA) 400 mg/5 mL oral suspension Take 15 mL by mouth daily at bedtime (Patient not taking: Reported on 6/27/2023) 500 mL 1     No current facility-administered medications for this visit.     Current Outpatient Medications on File Prior to Visit   Medication Sig    acetaminophen (TYLENOL) 500 mg tablet Take 1 tablet (500 mg total) by mouth every 6 (six) hours as needed for mild pain or moderate pain    amLODIPine (NORVASC) 5 mg tablet Take 1 tablet (5 mg total) by mouth daily    ciclopirox (LOPROX) 0.77 % SUSP Apply 1 Application  topically 2 (two) times a day    docusate sodium (COLACE) 100 mg capsule Take 1 capsule (100 mg total) by mouth 2 (two) times a day    fluticasone (FLONASE) 50 mcg/act nasal spray 2 sprays into each nostril daily    Fluticasone-Salmeterol (Advair) 250-50 mcg/dose inhaler Inhale 1 puff 2 (two) times a day Rinse mouth after use.    latanoprost (XALATAN) 0.005 % ophthalmic solution     levothyroxine (Levoxyl) 50 mcg tablet Take 1 tablet (50 mcg total) by mouth daily    lisinopril (ZESTRIL) 40 mg tablet Take 1 tablet (40 mg total) by mouth daily    metFORMIN (GLUCOPHAGE) 500 mg tablet Take 1 tablet (500 mg total) by mouth daily    pravastatin (PRAVACHOL) 40 mg tablet Take 1 tablet (40 mg total) by mouth daily    psyllium (METAMUCIL,KONSYL) 30.9 % Take 7.5 g by mouth daily at bedtime    [DISCONTINUED] guaifenesin-codeine (GUAIFENESIN AC) 100-10 MG/5ML liquid Take 5 mL by mouth 3 (three) times a day as needed for cough    chlorhexidine (PERIDEX) 0.12 % solution  (Patient not taking: Reported on 6/27/2023)    fluticasone (FLONASE) 50 mcg/act nasal spray 2 sprays into each nostril daily (Patient not taking: Reported on 6/4/2024)    magnesium hydroxide (MILK OF MAGNESIA) 400 mg/5 mL oral suspension Take 15 mL by mouth daily at bedtime (Patient not taking: Reported on 6/27/2023)     No current facility-administered medications on file prior to visit.     Medications Discontinued During This Encounter   Medication Reason    guaifenesin-codeine (GUAIFENESIN AC) 100-10 MG/5ML liquid       He is allergic to nyquil multi-symptom [pseudoeph-doxylamine-dm-apap] and penicillins..    Review of Systems   Constitutional:  Negative for appetite change, chills, fatigue and fever.   HENT:  Negative for sore throat and trouble swallowing.    Eyes:  Negative for redness.   Respiratory:  Negative for shortness of breath.    Cardiovascular:  Negative for chest pain and palpitations.   Gastrointestinal:  Negative for abdominal pain,  "constipation and diarrhea.   Genitourinary:  Negative for dysuria and hematuria.   Musculoskeletal:  Negative for back pain and neck pain.   Skin:  Negative for rash.        Skin lesion   Neurological:  Negative for seizures, weakness and headaches.   Hematological:  Negative for adenopathy.   Psychiatric/Behavioral:  Negative for confusion. The patient is not nervous/anxious.          Objective:      /82 (BP Location: Left arm, Patient Position: Sitting, Cuff Size: Standard)   Pulse 78   Temp 98 °F (36.7 °C) (Temporal)   Ht 5' 5\" (1.651 m)   Wt 86.2 kg (190 lb)   SpO2 95%   BMI 31.62 kg/m²     Results Reviewed       None            Recent Results (from the past 1344 hour(s))   TSH, 3rd generation    Collection Time: 08/26/24  9:12 AM   Result Value Ref Range    TSH 1.86 0.45 - 5.33 uIU/mL   T4, free    Collection Time: 08/26/24  9:12 AM   Result Value Ref Range    Free t4 1.02 0.61 - 1.12 ng/dL   T4, FREE    Collection Time: 08/26/24  9:12 AM   Result Value Ref Range    FREE T4 1.02 0.61 - 1.12 ng/dL   TSH, 3RD GENERATION    Collection Time: 08/26/24  9:12 AM   Result Value Ref Range    TSH 1.86 0.45 - 5.33 uIU/mL   POCT hemoglobin A1c    Collection Time: 09/10/24 11:55 AM   Result Value Ref Range    Hemoglobin A1C 7.0 (A) 6.5        Physical Exam  Constitutional:       General: He is not in acute distress.     Appearance: Normal appearance.   HENT:      Head: Normocephalic and atraumatic.      Nose: Nose normal.      Mouth/Throat:      Mouth: Mucous membranes are moist.   Eyes:      Extraocular Movements: Extraocular movements intact.      Pupils: Pupils are equal, round, and reactive to light.   Cardiovascular:      Rate and Rhythm: Normal rate and regular rhythm.      Pulses: Normal pulses.      Heart sounds: Normal heart sounds. No murmur heard.     No friction rub.   Pulmonary:      Effort: Pulmonary effort is normal. No respiratory distress.      Breath sounds: Normal breath sounds. No wheezing. "   Abdominal:      General: Abdomen is flat. Bowel sounds are normal. There is no distension.      Palpations: Abdomen is soft. There is no mass.      Tenderness: There is no abdominal tenderness. There is no guarding.   Musculoskeletal:         General: Normal range of motion.      Cervical back: Neck supple.   Skin:     Findings: Lesion present.      Comments: Painful skin tag on the back   Neurological:      General: No focal deficit present.      Mental Status: He is alert and oriented to person, place, and time. Mental status is at baseline.      Cranial Nerves: No cranial nerve deficit.   Psychiatric:         Mood and Affect: Mood normal.         Behavior: Behavior normal.       I have spent a total time of 45 minutes in caring for this patient on the day of the visit/encounter including Diagnostic results, Prognosis, Risks and benefits of tx options, Instructions for management, Importance of tx compliance, and Impressions.

## 2024-09-25 ENCOUNTER — CONSULT (OUTPATIENT)
Dept: SURGERY | Facility: CLINIC | Age: 74
End: 2024-09-25

## 2024-09-25 VITALS
HEART RATE: 84 BPM | BODY MASS INDEX: 32.02 KG/M2 | HEIGHT: 65 IN | TEMPERATURE: 97.9 F | RESPIRATION RATE: 12 BRPM | SYSTOLIC BLOOD PRESSURE: 134 MMHG | OXYGEN SATURATION: 96 % | DIASTOLIC BLOOD PRESSURE: 88 MMHG | WEIGHT: 192.2 LBS

## 2024-09-25 DIAGNOSIS — L98.9 PAINFUL SKIN LESION: ICD-10-CM

## 2024-09-25 NOTE — PROGRESS NOTES
"Ambulatory Visit  Name: Anirudh Hernadez      : 1950      MRN: [id number]  Encounter Provider: Carloz Pike MD  Encounter Date: 2024   Encounter department: [facility] GENERAL SURGERY [facility]    Assessment & Plan  Painful skin lesion  This was a skin tag which was removed in the office without difficulty.  Orders:    Ambulatory Referral to General Surgery      History of Present Illness     Anirudh Hernadez is a 74 y.o. male who presents with painful skin lesion on his back.  Is been there for some time.    Procedures  After permission, the area is prepped with alcohol.  Local anesthesia 1% lidocaine with epi was infiltrated.  Scissors was used and amputated the skin tag.  The base was cauterized with silver nitrate.  Band-Aid dressing applied      Review of Systems        Objective     /88   Pulse 84   Temp 97.9 °F (36.6 °C) (Temporal)   Resp 12   Ht 5' 5\" (1.651 m)   Wt 87.2 kg (192 lb 3.2 oz)   SpO2 96%   BMI 31.98 kg/m²     Physical Exam  Back: There is a large skin tag in his upper back with about 3 mm base    "

## 2024-12-01 DIAGNOSIS — E78.5 HYPERLIPIDEMIA, UNSPECIFIED HYPERLIPIDEMIA TYPE: ICD-10-CM

## 2024-12-02 RX ORDER — PRAVASTATIN SODIUM 40 MG
40 TABLET ORAL DAILY
Qty: 90 TABLET | Refills: 1 | Status: SHIPPED | OUTPATIENT
Start: 2024-12-02

## 2024-12-03 DIAGNOSIS — E03.9 ACQUIRED HYPOTHYROIDISM: ICD-10-CM

## 2024-12-03 LAB
ALBUMIN SERPL-MCNC: 4 G/DL (ref 3.5–5.7)
ALP SERPL-CCNC: 101 U/L (ref 35–120)
ALT SERPL-CCNC: 11 U/L
ANION GAP SERPL CALCULATED.3IONS-SCNC: 10 MMOL/L (ref 3–11)
AST SERPL-CCNC: 18 U/L
BASOPHILS # BLD AUTO: 0.1 THOU/CMM (ref 0–0.1)
BASOPHILS NFR BLD AUTO: 1 %
BILIRUB SERPL-MCNC: 1 MG/DL (ref 0.2–1)
BUN SERPL-MCNC: 17 MG/DL (ref 7–28)
CALCIUM SERPL-MCNC: 8.9 MG/DL (ref 8.5–10.5)
CHLORIDE SERPL-SCNC: 105 MMOL/L (ref 100–109)
CHOLEST SERPL-MCNC: 150 MG/DL
CHOLEST/HDLC SERPL: 3.7 {RATIO}
CO2 SERPL-SCNC: 28 MMOL/L (ref 21–31)
CREAT SERPL-MCNC: 1.09 MG/DL (ref 0.53–1.3)
CYTOLOGY CMNT CVX/VAG CYTO-IMP: ABNORMAL
DIFFERENTIAL METHOD BLD: NORMAL
EOSINOPHIL # BLD AUTO: 0.2 THOU/CMM (ref 0–0.5)
EOSINOPHIL NFR BLD AUTO: 2 %
ERYTHROCYTE [DISTWIDTH] IN BLOOD BY AUTOMATED COUNT: 15.1 % (ref 12–16)
GFR/BSA.PRED SERPLBLD CYS-BASED-ARV: 70 ML/MIN/{1.73_M2}
GLUCOSE SERPL-MCNC: 119 MG/DL (ref 65–99)
GLUCOSE UR QL STRIP: NEGATIVE MG/DL
HCT VFR BLD AUTO: 43.4 % (ref 37–48)
HDLC SERPL-MCNC: 41 MG/DL (ref 23–92)
HGB BLD-MCNC: 14.5 G/DL (ref 12.5–17)
HGB UR QL STRIP: NEGATIVE MG/DL
KETONES UR QL STRIP: NEGATIVE MG/DL
LDLC SERPL CALC-MCNC: 89 MG/DL
LEUKOCYTE ESTERASE UR QL STRIP: NEGATIVE /UL
LG PLATELETS BLD QL SMEAR: NORMAL
LYMPHOCYTES # BLD AUTO: 1.4 THOU/CMM (ref 1–3)
LYMPHOCYTES NFR BLD AUTO: 16 %
MCH RBC QN AUTO: 30.3 PG (ref 27–36)
MCHC RBC AUTO-ENTMCNC: 33.4 G/DL (ref 32–37)
MCV RBC AUTO: 91 FL (ref 80–100)
MONOCYTES # BLD AUTO: 0.9 THOU/CMM (ref 0.3–1)
MONOCYTES NFR BLD AUTO: 11 %
MORPHOLOGY BLD-IMP: NORMAL
MUCOUS THREADS URNS QL MICRO: NORMAL
NEUTROPHILS # BLD AUTO: 6.2 THOU/CMM (ref 1.8–7.8)
NEUTROPHILS NFR BLD AUTO: 70 %
NITRITE UR QL STRIP: NEGATIVE
NONHDLC SERPL-MCNC: 109 MG/DL
PH UR: 7 [PH] (ref 4.5–8)
PLATELET # BLD AUTO: 143 THOU/CMM (ref 140–350)
PMV BLD REES-ECKER: 10.9 FL (ref 7.5–11.3)
POTASSIUM SERPL-SCNC: 4.4 MMOL/L (ref 3.5–5.2)
PROT 24H UR-MRATE: NEGATIVE MG/DL
PROT SERPL-MCNC: 7 G/DL (ref 6.3–8.3)
RBC # BLD AUTO: 4.8 MILL/CMM (ref 4–5.4)
RBC #/AREA URNS HPF: 0 /HPF (ref 0–2)
SL AMB POCT URINE COMMENT: NORMAL
SODIUM SERPL-SCNC: 143 MMOL/L (ref 135–145)
SP GR UR: 1.01 (ref 1–1.03)
TRANS CELLS #/AREA URNS HPF: NORMAL /LPF (ref 0–1)
TRIGL SERPL-MCNC: 99 MG/DL
TSH SERPL-ACNC: 4.2 UIU/ML (ref 0.45–5.33)
WBC # BLD AUTO: 8.8 THOU/CMM (ref 4–10.5)
WBC #/AREA URNS HPF: NORMAL /HPF (ref 0–5)

## 2024-12-03 RX ORDER — LEVOTHYROXINE SODIUM 50 UG/1
50 TABLET ORAL DAILY
Qty: 90 TABLET | Refills: 0 | Status: SHIPPED | OUTPATIENT
Start: 2024-12-03

## 2024-12-03 NOTE — TELEPHONE ENCOUNTER
Patient needs refill on his levothyroxine 50 mg sent to [facility] mail order pharmacy .      Last [facility] 09/10/2024  Next [facility] 12/10/2024

## 2024-12-10 ENCOUNTER — OFFICE VISIT (OUTPATIENT)
Dept: INTERNAL MEDICINE CLINIC | Facility: CLINIC | Age: 74
End: 2024-12-10
Payer: COMMERCIAL

## 2024-12-10 VITALS
DIASTOLIC BLOOD PRESSURE: 82 MMHG | WEIGHT: 190 LBS | BODY MASS INDEX: 31.65 KG/M2 | TEMPERATURE: 97.9 F | HEIGHT: 65 IN | SYSTOLIC BLOOD PRESSURE: 124 MMHG | OXYGEN SATURATION: 96 % | HEART RATE: 77 BPM

## 2024-12-10 DIAGNOSIS — E11.9 TYPE 2 DIABETES MELLITUS WITHOUT COMPLICATION, WITH LONG-TERM CURRENT USE OF INSULIN (HCC): ICD-10-CM

## 2024-12-10 DIAGNOSIS — D69.6 THROMBOCYTOPENIA (HCC): ICD-10-CM

## 2024-12-10 DIAGNOSIS — J45.40 MODERATE PERSISTENT ASTHMA WITHOUT COMPLICATION: ICD-10-CM

## 2024-12-10 DIAGNOSIS — Z11.59 NEED FOR HEPATITIS B SCREENING TEST: ICD-10-CM

## 2024-12-10 DIAGNOSIS — E78.5 HYPERLIPIDEMIA, UNSPECIFIED HYPERLIPIDEMIA TYPE: ICD-10-CM

## 2024-12-10 DIAGNOSIS — Z79.4 TYPE 2 DIABETES MELLITUS WITHOUT COMPLICATION, WITH LONG-TERM CURRENT USE OF INSULIN (HCC): ICD-10-CM

## 2024-12-10 DIAGNOSIS — K75.81 STEATOHEPATITIS: ICD-10-CM

## 2024-12-10 DIAGNOSIS — E03.9 ACQUIRED HYPOTHYROIDISM: Primary | ICD-10-CM

## 2024-12-10 PROCEDURE — G2211 COMPLEX E/M VISIT ADD ON: HCPCS | Performed by: INTERNAL MEDICINE

## 2024-12-10 PROCEDURE — 99214 OFFICE O/P EST MOD 30 MIN: CPT | Performed by: INTERNAL MEDICINE

## 2024-12-10 NOTE — PROGRESS NOTES
Assessment/Plan:           1. Acquired hypothyroidism  Comments:  Stable continue levothyroxine.  2. Hyperlipidemia, unspecified hyperlipidemia type  Comments:  Stable continue pravastatin.  3. Moderate persistent asthma without complication  4. Type 2 diabetes mellitus without complication, with long-term current use of insulin (HCC)  Comments:  Labs reviewed continue current regimen.  5. Need for hepatitis B screening test  -     Hepatitis B surface antibody; Future  6. Steatohepatitis  -     Hepatitis A antibody, total; Future  7. Thrombocytopenia (HCC)         1. Acquired hypothyroidism (Primary)      2. Hyperlipidemia, unspecified hyperlipidemia type      3. Moderate persistent asthma without complication      4. Type 2 diabetes mellitus without complication, with long-term current use of insulin (HCC)         No problem-specific Assessment & Plan notes found for this encounter.           Subjective:      Patient ID: Anirudh Hernadez is a 74 y.o. male.    HPI    The following portions of the patient's history were reviewed and updated as appropriate: He  has a past medical history of Asthma, Bladder cancer (HCC), Cancer (HCC), Constipation, CPAP (continuous positive airway pressure) dependence, Diabetes mellitus (HCC), Diabetes mellitus due to underlying condition, controlled, without complication, without long-term current use of insulin (HCC), HTN (hypertension), Hyperlipidemia, Hyperlipidemia, Hypertension, Lung disease, Non-seasonal allergic rhinitis, Obstructive sleep apnea (adult) (pediatric), Other asthma, Rhinophyma, Sarcoid (10/10/2017), and Sleep apnea with use of continuous positive airway pressure (CPAP).  He   Patient Active Problem List    Diagnosis Date Noted    Thrombocytopenia (HCC) 12/10/2024    Atherosclerosis of aorta (HCC) 10/03/2023    Type 2 diabetes mellitus with other skin complications (HCC) 02/28/2023    History of bladder cancer 07/26/2022    Sleep apnea with use of continuous  positive airway pressure (CPAP)     HTN (hypertension)     Hyperlipidemia     Rhinophyma     Diabetes mellitus due to underlying condition, controlled, without complication, without long-term current use of insulin (HCC)     Asthma     Sarcoid 10/10/2017     He  has a past surgical history that includes Appendectomy; Cystoscopy; pr septoplasty/submucous resecj w/wo cartilage grf (N/A, 12/11/2017); pr nasal/sinus ndsc w/total ethoidectomy (N/A, 12/11/2017); Bladder surgery; Prostate surgery; Colonoscopy (02/10/2016); CECECTOMY LAPAROSCOPIC; and pr septoplasty/submucous resecj w/wo cartilage grf (N/A, 5/28/2021).  His family history includes Coronary artery disease in his brother; Dementia in his sister; Diabetes in his father and mother; Heart disease in his father and mother; Hypertension in his family; Lung cancer in his brother.  He  reports that he has never smoked. He has never used smokeless tobacco. He reports that he does not drink alcohol and does not use drugs.  Current Outpatient Medications   Medication Sig Dispense Refill    acetaminophen (TYLENOL) 500 mg tablet Take 1 tablet (500 mg total) by mouth every 6 (six) hours as needed for mild pain or moderate pain 40 tablet 0    amLODIPine (NORVASC) 5 mg tablet Take 1 tablet (5 mg total) by mouth daily 100 tablet 1    ciclopirox (LOPROX) 0.77 % SUSP Apply 1 Application topically 2 (two) times a day 60 mL 3    docusate sodium (COLACE) 100 mg capsule Take 1 capsule (100 mg total) by mouth 2 (two) times a day 100 capsule 3    fluticasone (FLONASE) 50 mcg/act nasal spray 2 sprays into each nostril daily 16 g 5    fluticasone (FLONASE) 50 mcg/act nasal spray 2 sprays into each nostril daily 16 g 5    Fluticasone-Salmeterol (Advair) 250-50 mcg/dose inhaler Inhale 1 puff 2 (two) times a day Rinse mouth after use. 180 blister 1    hydrocortisone 1 % cream Apply topically 4 (four) times a day as needed for irritation 20 g 1    latanoprost (XALATAN) 0.005 % ophthalmic  solution       levothyroxine (Levoxyl) 50 mcg tablet Take 1 tablet (50 mcg total) by mouth daily 90 tablet 0    lisinopril (ZESTRIL) 40 mg tablet Take 1 tablet (40 mg total) by mouth daily 90 tablet 1    metFORMIN (GLUCOPHAGE) 500 mg tablet Take 1 tablet (500 mg total) by mouth daily 90 tablet 1    pravastatin (PRAVACHOL) 40 mg tablet Take 1 tablet (40 mg total) by mouth daily 90 tablet 1    psyllium (METAMUCIL,KONSYL) 30.9 % Take 7.5 g by mouth daily at bedtime 500 g 2    zinc oxide (BALMEX) 11.3 % cream Apply topically 2 (two) times a day as needed (irritation) 20 g 0    chlorhexidine (PERIDEX) 0.12 % solution  (Patient not taking: Reported on 6/27/2023)      magnesium hydroxide (MILK OF MAGNESIA) 400 mg/5 mL oral suspension Take 15 mL by mouth daily at bedtime (Patient not taking: Reported on 6/27/2023) 500 mL 1     No current facility-administered medications for this visit.     Current Outpatient Medications on File Prior to Visit   Medication Sig    acetaminophen (TYLENOL) 500 mg tablet Take 1 tablet (500 mg total) by mouth every 6 (six) hours as needed for mild pain or moderate pain    amLODIPine (NORVASC) 5 mg tablet Take 1 tablet (5 mg total) by mouth daily    ciclopirox (LOPROX) 0.77 % SUSP Apply 1 Application topically 2 (two) times a day    docusate sodium (COLACE) 100 mg capsule Take 1 capsule (100 mg total) by mouth 2 (two) times a day    fluticasone (FLONASE) 50 mcg/act nasal spray 2 sprays into each nostril daily    fluticasone (FLONASE) 50 mcg/act nasal spray 2 sprays into each nostril daily    Fluticasone-Salmeterol (Advair) 250-50 mcg/dose inhaler Inhale 1 puff 2 (two) times a day Rinse mouth after use.    hydrocortisone 1 % cream Apply topically 4 (four) times a day as needed for irritation    latanoprost (XALATAN) 0.005 % ophthalmic solution     levothyroxine (Levoxyl) 50 mcg tablet Take 1 tablet (50 mcg total) by mouth daily    lisinopril (ZESTRIL) 40 mg tablet Take 1 tablet (40 mg total) by  "mouth daily    metFORMIN (GLUCOPHAGE) 500 mg tablet Take 1 tablet (500 mg total) by mouth daily    pravastatin (PRAVACHOL) 40 mg tablet Take 1 tablet (40 mg total) by mouth daily    psyllium (METAMUCIL,KONSYL) 30.9 % Take 7.5 g by mouth daily at bedtime    zinc oxide (BALMEX) 11.3 % cream Apply topically 2 (two) times a day as needed (irritation)    chlorhexidine (PERIDEX) 0.12 % solution  (Patient not taking: Reported on 6/27/2023)    magnesium hydroxide (MILK OF MAGNESIA) 400 mg/5 mL oral suspension Take 15 mL by mouth daily at bedtime (Patient not taking: Reported on 6/27/2023)     No current facility-administered medications on file prior to visit.     There are no discontinued medications.   He is allergic to nyquil multi-symptom [pseudoeph-doxylamine-dm-apap] and penicillins..    Review of Systems      Objective:      /82   Pulse 77   Temp 97.9 °F (36.6 °C) (Temporal)   Ht 5' 5\" (1.651 m)   Wt 86.2 kg (190 lb)   SpO2 96%   BMI 31.62 kg/m²     Results Reviewed       None            Recent Results (from the past 8 weeks)   Urinalysis with microscopic    Collection Time: 12/03/24  8:51 AM   Result Value Ref Range    Specific Gravity Ur 1.015 1.003 - 1.030    pH, Urine 7.0 4.5 - 8.0    Protein, Urine Negative NEG mg/dL    Glucose, Urine Negative NEG mg/dL    Ketone, Urine Negative NEG mg/dL    Blood, Urine Negative NEG mg/dL    Leukocyte Esterase Negative NEG /uL    Nitrites Urine Negative NEG    POCT URINE COMMENT Not applicable     RBC, Urine 0 0 - 2 /hpf    SL AMB WBC, URINE Rare 0 - 5 /hpf    MUCUS THREADS Few     Transitional Epithelial Cells 0-2 0 - 1 /lpf   TSH, 3rd generation    Collection Time: 12/03/24  8:51 AM   Result Value Ref Range    TSH 4.20 0.45 - 5.33 uIU/mL   CBC and differential    Collection Time: 12/03/24  8:51 AM   Result Value Ref Range    Hemoglobin 14.5 12.5 - 17.0 g/dL    HCT 43.4 37.0 - 48.0 %    White Blood Cell Count 8.8 4.0 - 10.5 thou/cmm    Red Blood Cell Count 4.80 " 4.00 - 5.40 mill/cmm    Platelet Count 143 140 - 350 thou/cmm    SL AMB MPV 10.9 7.5 - 11.3 fL    MCV 91 80 - 100 fL    MCH 30.3 27.0 - 36.0 pg    MCHC 33.4 32.0 - 37.0 g/dL    RDW 15.1 12.0 - 16.0 %    Differential Type AUTO     Neutrophils (Absolute) 6.2 1.8 - 7.8 thou/cmm    Lymphocytes (Absolute) 1.4 1.0 - 3.0 thou/cmm    Monocytes (Absolute) 0.9 0.3 - 1.0 thou/cmm    Eosinophils (Absolute) 0.2 0.0 - 0.5 thou/cmm    Basophils ABS 0.1 0.0 - 0.1 thou/cmm    Neutrophils 70 %    Lymphocytes 16 %    Monocytes 11 %    Eosinophils 2 %    Basophils PCT 1 %    Large Platelets Rare     Hematology Comments      Comprehensive metabolic panel    Collection Time: 12/03/24  8:51 AM   Result Value Ref Range    Glucose, Random 119 (H) 65 - 99 mg/dL    BUN 17 7 - 28 mg/dL    Creatinine 1.09 0.53 - 1.30 mg/dL    Sodium 143 135 - 145 mmol/L    Potassium 4.4 3.5 - 5.2 mmol/L    Chloride 105 100 - 109 mmol/L    CO2 28 21 - 31 mmol/L    Calcium 8.9 8.5 - 10.5 mg/dL    Alkaline Phosphatase 101 35 - 120 U/L    Albumin 4.0 3.5 - 5.7 g/dL    TOTAL BILIRUBIN 1.0 0.2 - 1.0 mg/dL    Protein, Total 7.0 6.3 - 8.3 g/dL    AST 18 <41 U/L    ALT 11 <56 U/L    ANION GAP 10 3 - 11    eGFR 70 >59    Comment (Note)    Lipid panel    Collection Time: 12/03/24  8:51 AM   Result Value Ref Range    Cholesterol, Total 150 <200 mg/dL    Triglycerides 99 <150 mg/dL    HDL Cholesterol 41 23 - 92 mg/dL    Non HDL Chol. (LDL+VLDL) 109 <160 mg/dL    LDL Calculated 89 <130 mg/dL    Chol HDLC Ratio 3.7    COMPREHENSIVE METABOLIC PANEL    Collection Time: 12/03/24  8:51 AM   Result Value Ref Range    Glucose 119 (H) 65 - 99 mg/dL    BUN 17 7 - 28 mg/dL    Creatinine 1.09 0.53 - 1.30 mg/dL    Sodium 143 135 - 145 mmol/L    Potassium 4.4 3.5 - 5.2 mmol/L    Chloride 105 100 - 109 mmol/L    Carbon Dioxide 28 21 - 31 mmol/L    Calcium 8.9 8.5 - 10.5 mg/dL    Alkaline Phosphatase 101 35 - 120 U/L    ALBUMIN 4.0 3.5 - 5.7 g/dL    Total Bilirubin 1.0 0.2 - 1.0 mg/dL     Protein, Total 7.0 6.3 - 8.3 g/dL    AST 18 <41 U/L    ALT 11 <56 U/L    ANION GAP 10 3 - 11    eGFRcr 70 >59    eGFR Comment Interpretive information: calculated GFR    TSH, 3RD GENERATION    Collection Time: 12/03/24  8:51 AM   Result Value Ref Range    TSH 4.20 0.45 - 5.33 uIU/mL        Physical Exam  Constitutional:       General: He is not in acute distress.     Appearance: Normal appearance.   HENT:      Head: Normocephalic and atraumatic.      Nose: Nose normal.      Mouth/Throat:      Mouth: Mucous membranes are moist.   Eyes:      Extraocular Movements: Extraocular movements intact.      Pupils: Pupils are equal, round, and reactive to light.   Cardiovascular:      Rate and Rhythm: Normal rate and regular rhythm.      Pulses: Normal pulses.      Heart sounds: Normal heart sounds. No murmur heard.     No friction rub.   Pulmonary:      Effort: Pulmonary effort is normal. No respiratory distress.      Breath sounds: Normal breath sounds. No wheezing.   Abdominal:      General: Abdomen is flat. Bowel sounds are normal. There is no distension.      Palpations: Abdomen is soft. There is no mass.      Tenderness: There is no abdominal tenderness. There is no guarding.   Musculoskeletal:         General: Normal range of motion.      Cervical back: Normal range of motion and neck supple.   Skin:     General: Skin is warm.   Neurological:      General: No focal deficit present.      Mental Status: He is alert and oriented to person, place, and time. Mental status is at baseline.      Cranial Nerves: No cranial nerve deficit.   Psychiatric:         Mood and Affect: Mood normal.         Behavior: Behavior normal.

## 2024-12-13 ENCOUNTER — RESULTS FOLLOW-UP (OUTPATIENT)
Dept: INTERNAL MEDICINE CLINIC | Facility: CLINIC | Age: 74
End: 2024-12-13

## 2024-12-13 LAB
HAV AB SER-IMP: ABNORMAL
HAV IGG+IGM SER QL: REACTIVE
HAV IGM SERPL QL IA: NONREACTIVE

## 2024-12-13 NOTE — TELEPHONE ENCOUNTER
----- Message from Javier Lisa MD sent at 12/13/2024  4:14 PM EST -----  Please tell him that hepatitis A antibody present means he had an old infection or had immunization and he is immune to hepatitis A.  ----- Message -----  From: Ti Figueroa Orders/Results  Sent: 12/13/2024   1:05 PM EST  To: Javier Lisa MD

## 2024-12-14 LAB — HBV E AB SERPL QL IA: NEGATIVE

## 2024-12-23 DIAGNOSIS — E11.9 TYPE 2 DIABETES MELLITUS WITHOUT COMPLICATION, WITH LONG-TERM CURRENT USE OF INSULIN (HCC): ICD-10-CM

## 2024-12-23 DIAGNOSIS — Z79.4 TYPE 2 DIABETES MELLITUS WITHOUT COMPLICATION, WITH LONG-TERM CURRENT USE OF INSULIN (HCC): ICD-10-CM

## 2024-12-23 DIAGNOSIS — E78.5 HYPERLIPIDEMIA, UNSPECIFIED HYPERLIPIDEMIA TYPE: ICD-10-CM

## 2024-12-23 RX ORDER — PRAVASTATIN SODIUM 40 MG
40 TABLET ORAL DAILY
Qty: 90 TABLET | Refills: 1 | Status: SHIPPED | OUTPATIENT
Start: 2024-12-23

## 2025-02-24 DIAGNOSIS — I10 HYPERTENSION, UNSPECIFIED TYPE: ICD-10-CM

## 2025-02-24 RX ORDER — LISINOPRIL 40 MG/1
40 TABLET ORAL DAILY
Qty: 90 TABLET | Refills: 1 | Status: SHIPPED | OUTPATIENT
Start: 2025-02-24

## 2025-02-26 ENCOUNTER — RA CDI HCC (OUTPATIENT)
Dept: OTHER | Facility: HOSPITAL | Age: 75
End: 2025-02-26

## 2025-02-26 NOTE — PROGRESS NOTES
HCC coding opportunities          Chart Reviewed number of suggestions sent to Provider: 3   E11.22, E11.36  Recommend adding I12.9 - combination code - hypertensive renal disease.  Patient's eGFR values are in the stage 2 CKD range    Patients Insurance     Medicare Insurance: [employer] Advantage

## 2025-03-04 DIAGNOSIS — E03.9 ACQUIRED HYPOTHYROIDISM: ICD-10-CM

## 2025-03-04 RX ORDER — LEVOTHYROXINE SODIUM 50 UG/1
50 TABLET ORAL DAILY
Qty: 90 TABLET | Refills: 0 | Status: SHIPPED | OUTPATIENT
Start: 2025-03-04

## 2025-03-11 ENCOUNTER — OFFICE VISIT (OUTPATIENT)
Dept: INTERNAL MEDICINE CLINIC | Facility: CLINIC | Age: 75
End: 2025-03-11
Payer: COMMERCIAL

## 2025-03-11 VITALS
BODY MASS INDEX: 30.56 KG/M2 | DIASTOLIC BLOOD PRESSURE: 80 MMHG | WEIGHT: 183.4 LBS | OXYGEN SATURATION: 97 % | TEMPERATURE: 98.8 F | HEART RATE: 71 BPM | SYSTOLIC BLOOD PRESSURE: 130 MMHG | HEIGHT: 65 IN

## 2025-03-11 DIAGNOSIS — Z79.4 TYPE 2 DIABETES MELLITUS WITHOUT COMPLICATION, WITH LONG-TERM CURRENT USE OF INSULIN (HCC): ICD-10-CM

## 2025-03-11 DIAGNOSIS — E11.9 TYPE 2 DIABETES MELLITUS WITHOUT COMPLICATION, WITH LONG-TERM CURRENT USE OF INSULIN (HCC): ICD-10-CM

## 2025-03-11 DIAGNOSIS — I10 HYPERTENSION, UNSPECIFIED TYPE: Primary | ICD-10-CM

## 2025-03-11 DIAGNOSIS — J45.40 MODERATE PERSISTENT ASTHMA WITHOUT COMPLICATION: ICD-10-CM

## 2025-03-11 DIAGNOSIS — E03.9 ACQUIRED HYPOTHYROIDISM: ICD-10-CM

## 2025-03-11 DIAGNOSIS — Z00.00 MEDICARE ANNUAL WELLNESS VISIT, SUBSEQUENT: ICD-10-CM

## 2025-03-11 DIAGNOSIS — Z85.51 HISTORY OF BLADDER CANCER: ICD-10-CM

## 2025-03-11 LAB
CREAT UR-MCNC: 109.6 MG/DL
MICROALBUMIN UR-MCNC: 27.9 MG/L
MICROALBUMIN/CREAT 24H UR: 25 MG/G CREATININE (ref 0–30)
SL AMB POCT HEMOGLOBIN AIC: 6.5 (ref ?–6.5)

## 2025-03-11 PROCEDURE — G0439 PPPS, SUBSEQ VISIT: HCPCS | Performed by: INTERNAL MEDICINE

## 2025-03-11 PROCEDURE — 99214 OFFICE O/P EST MOD 30 MIN: CPT | Performed by: INTERNAL MEDICINE

## 2025-03-11 PROCEDURE — 83036 HEMOGLOBIN GLYCOSYLATED A1C: CPT | Performed by: INTERNAL MEDICINE

## 2025-03-11 PROCEDURE — 82043 UR ALBUMIN QUANTITATIVE: CPT | Performed by: INTERNAL MEDICINE

## 2025-03-11 PROCEDURE — 82570 ASSAY OF URINE CREATININE: CPT | Performed by: INTERNAL MEDICINE

## 2025-03-11 NOTE — PROGRESS NOTES
Name: Anirudh Hernadez      : 1950      MRN: [id number]  Encounter Provider: Javier Lisa MD  Encounter Date: 3/11/2025   Encounter department: [facility]    Assessment & Plan  Hypertension, unspecified type         Type 2 diabetes mellitus without complication, with long-term current use of insulin (HCC)    Lab Results   Component Value Date    HGBA1C 6.5 2025       Orders:    Albumin / creatinine urine ratio    POCT hemoglobin A1c    Acquired hypothyroidism    Orders:    CBC and differential; Future    Comprehensive metabolic panel; Future    T4, free; Future    TSH, 3rd generation; Future    Urinalysis with microscopic; Future    Moderate persistent asthma without complication         Medicare annual wellness visit, subsequent         History of bladder cancer            1. Hypertension, unspecified type      Home blood pressure machine readings reviewed and checked.      2. Type 2 diabetes mellitus without complication, with long-term current use of insulin (HCC)  Albumin / creatinine urine ratio    POCT hemoglobin A1c    Will continue current regimen.      3. Acquired hypothyroidism  CBC and differential    Comprehensive metabolic panel    T4, free    TSH, 3rd generation    Urinalysis with microscopic      4. Moderate persistent asthma without complication        5. Medicare annual wellness visit, subsequent        6. History of bladder cancer           Preventive health issues were discussed with patient, and age appropriate screening tests were ordered as noted in patient's After Visit Summary. Personalized health advice and appropriate referrals for health education or preventive services given if needed, as noted in patient's After Visit Summary.    History of Present Illness     HPI   Patient Care Team:  Javier Lisa MD as PCP - General (Internal Medicine)  Valentino Hung MD as PCP - PCP-[facility] ([facility])  Javier Lisa MD as PCP -  PCP-[facility] (RTE)  Raza Childers MD (Urology)  Vincenzo Smith MD (Otolaryngology)    Review of Systems   Constitutional:  Negative for appetite change, chills, fatigue and fever.   HENT:  Negative for sore throat and trouble swallowing.    Eyes:  Negative for redness.   Respiratory:  Negative for shortness of breath.    Cardiovascular:  Negative for chest pain and palpitations.   Gastrointestinal:  Negative for abdominal pain, constipation and diarrhea.   Genitourinary:  Negative for dysuria and hematuria.   Musculoskeletal:  Negative for back pain and neck pain.   Skin:  Negative for rash.   Neurological:  Negative for seizures, weakness and headaches.   Hematological:  Negative for adenopathy.   Psychiatric/Behavioral:  Negative for confusion. The patient is not nervous/anxious.      Medical History Reviewed by provider this encounter:  Tobacco  Allergies  Meds  Problems  Med Hx  Surg Hx  Fam Hx       Annual Wellness Visit Questionnaire   Anirudh is here for his Subsequent Wellness visit. Last Medicare Wellness visit information reviewed, patient interviewed and updates made to the record today.      Health Risk Assessment:   Patient rates overall health as good. Patient feels that their physical health rating is same. Patient is satisfied with their life. Eyesight was rated as same. Hearing was rated as same. Patient feels that their emotional and mental health rating is same. Patients states they are sometimes angry. Patient states they are sometimes unusually tired/fatigued. Pain experienced in the last 7 days has been none. Patient states that he has experienced no weight loss or gain in last 6 months.     Depression Screening:   PHQ-2 Score: 0      Fall Risk Screening:   In the past year, patient has experienced: no history of falling in past year      Home Safety:  Patient does not have trouble with stairs inside or outside of their home. Patient has working smoke alarms and has working carbon  monoxide detector. Home safety hazards include: none.     Nutrition:   Current diet is Regular.     Medications:   Patient is currently taking over-the-counter supplements. OTC medications include: Vitamins. Patient is able to manage medications.     Activities of Daily Living (ADLs)/Instrumental Activities of Daily Living (IADLs):   Walk and transfer into and out of bed and chair?: Yes  Dress and groom yourself?: Yes    Bathe or shower yourself?: Yes    Feed yourself? Yes  Do your laundry/housekeeping?: Yes  Manage your money, pay your bills and track your expenses?: Yes  Make your own meals?: Yes    Do your own shopping?: Yes    Previous Hospitalizations:   Any hospitalizations or ED visits within the last 12 months?: No      Advance Care Planning:   Living will: Yes    Advanced directive: Yes      PREVENTIVE SCREENINGS      Cardiovascular Screening:    General: Screening Not Indicated and History Lipid Disorder      Diabetes Screening:     General: Screening Not Indicated and History Diabetes      Prostate Cancer Screening:    General: Screening Not Indicated      Lung Cancer Screening:     General: Screening Not Indicated      Hepatitis C Screening:    General: Screening Current    Screening, Brief Intervention, and Referral to Treatment (SBIRT)     Screening  Typical number of drinks in a day: 0  Typical number of drinks in a week: 0  Interpretation: Low risk drinking behavior.    Single Item Drug Screening:  How often have you used an illegal drug (including marijuana) or a prescription medication for non-medical reasons in the past year? never    Single Item Drug Screen Score: 0  Interpretation: Negative screen for possible drug use disorder    Other Counseling Topics:   Car/seat belt/driving safety, skin self-exam, sunscreen and calcium and vitamin D intake and regular weightbearing exercise.     Social Drivers of Health     Financial Resource Strain: Low Risk  (3/5/2024)    Overall Financial Resource Strain  "(CARDIA)     Difficulty of Paying Living Expenses: Not hard at all   Food Insecurity: No Food Insecurity (3/11/2025)    Hunger Vital Sign     Worried About Running Out of Food in the Last Year: Never true     Ran Out of Food in the Last Year: Never true   Transportation Needs: No Transportation Needs (3/11/2025)    PRAPARE - Transportation     Lack of Transportation (Medical): No     Lack of Transportation (Non-Medical): No   Housing Stability: Low Risk  (3/11/2025)    Housing Stability Vital Sign     Unable to Pay for Housing in the Last Year: No     Number of Times Moved in the Last Year: 0     Homeless in the Last Year: No   Utilities: Not At Risk (3/11/2025)    [facility] Utilities     Threatened with loss of utilities: No     No results found.    Objective   /80 Comment: home  Pulse 71   Temp 98.8 °F (37.1 °C) (Temporal)   Ht 5' 5\" (1.651 m)   Wt 83.2 kg (183 lb 6.4 oz)   SpO2 97%   BMI 30.52 kg/m²     Physical Exam  Vitals and nursing note reviewed.   Constitutional:       General: He is not in acute distress.     Appearance: He is well-developed.   HENT:      Head: Normocephalic and atraumatic.      Mouth/Throat:      Mouth: Mucous membranes are moist.   Eyes:      Conjunctiva/sclera: Conjunctivae normal.   Cardiovascular:      Rate and Rhythm: Normal rate and regular rhythm.      Heart sounds: No murmur heard.  Pulmonary:      Effort: Pulmonary effort is normal. No respiratory distress.      Breath sounds: Normal breath sounds.   Abdominal:      Palpations: Abdomen is soft.      Tenderness: There is no abdominal tenderness.   Musculoskeletal:         General: No swelling.      Cervical back: Normal range of motion and neck supple.   Skin:     General: Skin is warm and dry.      Capillary Refill: Capillary refill takes less than 2 seconds.   Neurological:      General: No focal deficit present.      Mental Status: He is alert.   Psychiatric:         Mood and Affect: Mood normal.         "

## 2025-05-15 DIAGNOSIS — J30.89 NON-SEASONAL ALLERGIC RHINITIS, UNSPECIFIED TRIGGER: ICD-10-CM

## 2025-05-15 DIAGNOSIS — J33.9 NASAL POLYPOSIS: ICD-10-CM

## 2025-05-15 DIAGNOSIS — J32.1 CHRONIC FRONTAL SINUSITIS: ICD-10-CM

## 2025-05-16 RX ORDER — FLUTICASONE PROPIONATE 93 UG/1
1 SPRAY, METERED NASAL 2 TIMES DAILY
Qty: 16 ML | Refills: 5 | Status: SHIPPED | OUTPATIENT
Start: 2025-05-16

## 2025-05-16 RX ORDER — FLUTICASONE PROPIONATE 50 MCG
2 SPRAY, SUSPENSION (ML) NASAL DAILY
Qty: 16 G | Refills: 5 | Status: SHIPPED | OUTPATIENT
Start: 2025-05-16

## 2025-06-03 LAB
ALBUMIN SERPL-MCNC: 4.1 G/DL (ref 3.5–5.7)
ALP SERPL-CCNC: 92 U/L (ref 35–120)
ALT SERPL-CCNC: 12 U/L
ANION GAP SERPL CALCULATED.3IONS-SCNC: 8 MMOL/L (ref 3–11)
AST SERPL-CCNC: 16 U/L
BASOPHILS # BLD AUTO: 0.1 THOU/CMM (ref 0–0.1)
BASOPHILS NFR BLD AUTO: 1 %
BILIRUB SERPL-MCNC: 1.2 MG/DL (ref 0.2–1)
BUN SERPL-MCNC: 17 MG/DL (ref 7–28)
CALCIUM SERPL-MCNC: 8.5 MG/DL (ref 8.5–10.5)
CHLORIDE SERPL-SCNC: 107 MMOL/L (ref 100–109)
CO2 SERPL-SCNC: 29 MMOL/L (ref 21–31)
CREAT SERPL-MCNC: 0.97 MG/DL (ref 0.53–1.3)
CYTOLOGY CMNT CVX/VAG CYTO-IMP: ABNORMAL
DIFFERENTIAL METHOD BLD: NORMAL
EOSINOPHIL # BLD AUTO: 0.3 THOU/CMM (ref 0–0.5)
EOSINOPHIL NFR BLD AUTO: 4 %
ERYTHROCYTE [DISTWIDTH] IN BLOOD BY AUTOMATED COUNT: 14.5 % (ref 12–16)
GFR/BSA.PRED SERPLBLD CYS-BASED-ARV: 80 ML/MIN/{1.73_M2}
GIANT PLATELETS BLD QL SMEAR: SLIGHT
GLUCOSE SERPL-MCNC: 126 MG/DL (ref 65–99)
GLUCOSE UR QL STRIP: NEGATIVE MG/DL
HCT VFR BLD AUTO: 42.2 % (ref 37–48)
HGB BLD-MCNC: 14.2 G/DL (ref 12.5–17)
HGB UR QL STRIP: NEGATIVE MG/DL
KETONES UR QL STRIP: NEGATIVE MG/DL
LEUKOCYTE ESTERASE UR QL STRIP: NEGATIVE /UL
LYMPHOCYTES # BLD AUTO: 1.6 THOU/CMM (ref 1–3)
LYMPHOCYTES NFR BLD AUTO: 20 %
MCH RBC QN AUTO: 29.9 PG (ref 27–36)
MCHC RBC AUTO-ENTMCNC: 33.8 G/DL (ref 32–37)
MCV RBC AUTO: 89 FL (ref 80–100)
MONOCYTES # BLD AUTO: 0.8 THOU/CMM (ref 0.3–1)
MONOCYTES NFR BLD AUTO: 10 %
MORPHOLOGY BLD-IMP: NORMAL
MUCOUS THREADS URNS QL MICRO: NORMAL
NEUTROPHILS # BLD AUTO: 5.4 THOU/CMM (ref 1.8–7.8)
NEUTROPHILS NFR BLD AUTO: 65 %
NITRITE UR QL STRIP: NEGATIVE
PH UR: 7 [PH] (ref 4.5–8)
PLATELET # BLD AUTO: 141 THOU/CMM (ref 140–350)
PMV BLD REES-ECKER: 10.9 FL (ref 7.5–11.3)
POTASSIUM SERPL-SCNC: 4.2 MMOL/L (ref 3.5–5.2)
PROT 24H UR-MRATE: NEGATIVE MG/DL
PROT SERPL-MCNC: 6.9 G/DL (ref 6.3–8.3)
RBC # BLD AUTO: 4.77 MILL/CMM (ref 4–5.4)
RBC #/AREA URNS HPF: 0 /HPF (ref 0–2)
SL AMB POCT URINE COMMENT: NORMAL
SODIUM SERPL-SCNC: 144 MMOL/L (ref 135–145)
SP GR UR: 1.02 (ref 1–1.03)
T4 FREE SERPL-MCNC: 1.11 NG/DL (ref 0.61–1.12)
TSH SERPL-ACNC: 2.61 UIU/ML (ref 0.45–5.33)
WBC # BLD AUTO: 8.1 THOU/CMM (ref 4–10.5)
WBC #/AREA URNS HPF: 0 /HPF (ref 0–5)

## 2025-06-18 ENCOUNTER — RA CDI HCC (OUTPATIENT)
Dept: OTHER | Facility: HOSPITAL | Age: 75
End: 2025-06-18

## 2025-06-24 ENCOUNTER — OFFICE VISIT (OUTPATIENT)
Dept: INTERNAL MEDICINE CLINIC | Facility: CLINIC | Age: 75
End: 2025-06-24
Payer: COMMERCIAL

## 2025-06-24 VITALS
WEIGHT: 184.8 LBS | HEIGHT: 65 IN | OXYGEN SATURATION: 97 % | BODY MASS INDEX: 30.79 KG/M2 | SYSTOLIC BLOOD PRESSURE: 128 MMHG | TEMPERATURE: 98.7 F | HEART RATE: 83 BPM | DIASTOLIC BLOOD PRESSURE: 82 MMHG

## 2025-06-24 DIAGNOSIS — E78.5 HYPERLIPIDEMIA, UNSPECIFIED HYPERLIPIDEMIA TYPE: ICD-10-CM

## 2025-06-24 DIAGNOSIS — J33.9 NASAL POLYPOSIS: ICD-10-CM

## 2025-06-24 DIAGNOSIS — J30.89 NON-SEASONAL ALLERGIC RHINITIS, UNSPECIFIED TRIGGER: ICD-10-CM

## 2025-06-24 DIAGNOSIS — E11.9 TYPE 2 DIABETES MELLITUS WITHOUT COMPLICATION, WITH LONG-TERM CURRENT USE OF INSULIN (HCC): ICD-10-CM

## 2025-06-24 DIAGNOSIS — J32.1 CHRONIC FRONTAL SINUSITIS: ICD-10-CM

## 2025-06-24 DIAGNOSIS — E03.9 ACQUIRED HYPOTHYROIDISM: ICD-10-CM

## 2025-06-24 DIAGNOSIS — J45.40 MODERATE PERSISTENT ASTHMA WITHOUT COMPLICATION: ICD-10-CM

## 2025-06-24 DIAGNOSIS — I10 PRIMARY HYPERTENSION: Primary | ICD-10-CM

## 2025-06-24 DIAGNOSIS — L30.9 DERMATITIS: ICD-10-CM

## 2025-06-24 DIAGNOSIS — Z79.4 TYPE 2 DIABETES MELLITUS WITHOUT COMPLICATION, WITH LONG-TERM CURRENT USE OF INSULIN (HCC): ICD-10-CM

## 2025-06-24 PROCEDURE — 99214 OFFICE O/P EST MOD 30 MIN: CPT | Performed by: INTERNAL MEDICINE

## 2025-06-24 PROCEDURE — G2211 COMPLEX E/M VISIT ADD ON: HCPCS | Performed by: INTERNAL MEDICINE

## 2025-06-24 RX ORDER — ALBUTEROL SULFATE 90 UG/1
2 INHALANT RESPIRATORY (INHALATION) AS NEEDED
COMMUNITY
End: 2025-06-24 | Stop reason: SDUPTHER

## 2025-06-24 RX ORDER — LEVOTHYROXINE SODIUM 50 UG/1
50 TABLET ORAL DAILY
Qty: 90 TABLET | Refills: 0 | Status: SHIPPED | OUTPATIENT
Start: 2025-06-24

## 2025-06-24 RX ORDER — ALBUTEROL SULFATE 90 UG/1
2 INHALANT RESPIRATORY (INHALATION) AS NEEDED
Qty: 18 G | Refills: 3 | Status: SHIPPED | OUTPATIENT
Start: 2025-06-24

## 2025-06-24 RX ORDER — PRAVASTATIN SODIUM 40 MG
40 TABLET ORAL DAILY
Qty: 90 TABLET | Refills: 1 | Status: SHIPPED | OUTPATIENT
Start: 2025-06-24

## 2025-06-24 RX ORDER — FLUTICASONE PROPIONATE 50 MCG
2 SPRAY, SUSPENSION (ML) NASAL DAILY
Qty: 16 G | Refills: 5 | Status: SHIPPED | OUTPATIENT
Start: 2025-06-24

## 2025-06-24 NOTE — PROGRESS NOTES
Assessment/Plan:           1. Primary hypertension  Comments:  Stable continue current regimen.  2. Non-seasonal allergic rhinitis, unspecified trigger  -     fluticasone (FLONASE) 50 mcg/act nasal spray; 2 sprays into each nostril daily  3. Chronic frontal sinusitis  -     fluticasone (FLONASE) 50 mcg/act nasal spray; 2 sprays into each nostril daily  4. Nasal polyposis  -     fluticasone (FLONASE) 50 mcg/act nasal spray; 2 sprays into each nostril daily  5. Acquired hypothyroidism  Comments:  Start levothyroxine 50 mcg daily.  Orders:  -     levothyroxine (Levoxyl) 50 mcg tablet; Take 1 tablet (50 mcg total) by mouth daily  6. Hyperlipidemia, unspecified hyperlipidemia type  Comments:  Low-cholesterol diet and continue pravastatin 40 mg daily.  Orders:  -     pravastatin (PRAVACHOL) 40 mg tablet; Take 1 tablet (40 mg total) by mouth daily  7. Type 2 diabetes mellitus without complication, with long-term current use of insulin (HCC)  -     metFORMIN (GLUCOPHAGE) 500 mg tablet; Take 1 tablet (500 mg total) by mouth daily  -     Albumin / creatinine urine ratio; Future  -     Comprehensive metabolic panel; Future  -     Lipid panel; Future  8. Dermatitis  9. Moderate persistent asthma without complication  -     albuterol (PROVENTIL HFA,VENTOLIN HFA) 90 mcg/act inhaler; Inhale 2 puffs as needed for wheezing         1. Non-seasonal allergic rhinitis, unspecified trigger (Primary)      2. Chronic frontal sinusitis      3. Nasal polyposis      4. Acquired hypothyroidism      5. Hyperlipidemia, unspecified hyperlipidemia type      6. Type 2 diabetes mellitus without complication, with long-term current use of insulin ([facility])         No problem-specific Assessment & Plan notes found for this encounter.           Subjective:      Patient ID: Anirudh Hernadez is a 74 y.o. male.    HPI    The following portions of the patient's history were reviewed and updated as appropriate: He  has a past medical history of Asthma,  Bladder cancer (HCC), Cancer (HCC), Constipation, CPAP (continuous positive airway pressure) dependence, Diabetes mellitus (HCC), Diabetes mellitus due to underlying condition, controlled, without complication, without long-term current use of insulin (HCC), HTN (hypertension), Hyperlipidemia, Hyperlipidemia, Hypertension, Lung disease, Non-seasonal allergic rhinitis, Obstructive sleep apnea (adult) (pediatric), Other asthma, Rhinophyma, Sarcoid (10/10/2017), and Sleep apnea with use of continuous positive airway pressure (CPAP).  He   Patient Active Problem List    Diagnosis Date Noted    Thrombocytopenia (HCC) 12/10/2024    Atherosclerosis of aorta (HCC) 10/03/2023    Type 2 diabetes mellitus with other skin complications (HCC) 02/28/2023    History of bladder cancer 07/26/2022    Sleep apnea with use of continuous positive airway pressure (CPAP)     HTN (hypertension)     Hyperlipidemia     Rhinophyma     Diabetes mellitus due to underlying condition, controlled, without complication, without long-term current use of insulin (HCC)     Asthma     Sarcoid 10/10/2017     He  has a past surgical history that includes Appendectomy; Cystoscopy; pr septoplasty/submucous resecj w/wo cartilage grf (N/A, 12/11/2017); pr nasal/sinus ndsc w/total ethoidectomy (N/A, 12/11/2017); Bladder surgery; Prostate surgery; Colonoscopy (02/10/2016); CECECTOMY LAPAROSCOPIC; and pr septoplasty/submucous resecj w/wo cartilage grf (N/A, 5/28/2021).  His family history includes Coronary artery disease in his brother; Dementia in his sister; Diabetes in his father and mother; Heart disease in his father and mother; Hypertension in his family; Lung cancer in his brother.  He  reports that he has never smoked. He has never used smokeless tobacco. He reports that he does not drink alcohol and does not use drugs.  Current Outpatient Medications   Medication Sig Dispense Refill    acetaminophen (TYLENOL) 500 mg tablet Take 1 tablet (500 mg total)  by mouth every 6 (six) hours as needed for mild pain or moderate pain 40 tablet 0    albuterol (PROVENTIL HFA,VENTOLIN HFA) 90 mcg/act inhaler Inhale 2 puffs as needed for wheezing 18 g 3    amLODIPine (NORVASC) 5 mg tablet Take 1 tablet (5 mg total) by mouth daily 100 tablet 1    fluticasone (FLONASE) 50 mcg/act nasal spray 2 sprays into each nostril daily 16 g 5    fluticasone (FLONASE) 50 mcg/act nasal spray 2 sprays into each nostril daily 16 g 5    Fluticasone Propionate (Xhance) 93 MCG/ACT EXHU 1 Application into each nostril in the morning and 1 Application before bedtime. 16 mL 5    Fluticasone-Salmeterol (Advair) 250-50 mcg/dose inhaler Inhale 1 puff 2 (two) times a day Rinse mouth after use. 180 blister 1    hydrocortisone 1 % cream Apply topically 4 (four) times a day as needed for irritation 20 g 1    latanoprost (XALATAN) 0.005 % ophthalmic solution       levothyroxine (Levoxyl) 50 mcg tablet Take 1 tablet (50 mcg total) by mouth daily 90 tablet 0    lisinopril (ZESTRIL) 40 mg tablet Take 1 tablet (40 mg total) by mouth daily 90 tablet 1    metFORMIN (GLUCOPHAGE) 500 mg tablet Take 1 tablet (500 mg total) by mouth daily 90 tablet 1    pravastatin (PRAVACHOL) 40 mg tablet Take 1 tablet (40 mg total) by mouth daily 90 tablet 1    ciclopirox (LOPROX) 0.77 % SUSP Apply 1 Application topically 2 (two) times a day (Patient not taking: Reported on 3/11/2025) 60 mL 3    docusate sodium (COLACE) 100 mg capsule Take 1 capsule (100 mg total) by mouth 2 (two) times a day (Patient not taking: Reported on 3/11/2025) 100 capsule 3    magnesium hydroxide (MILK OF MAGNESIA) 400 mg/5 mL oral suspension Take 15 mL by mouth daily at bedtime (Patient not taking: Reported on 6/27/2023) 500 mL 1    psyllium (METAMUCIL,KONSYL) 30.9 % Take 7.5 g by mouth daily at bedtime (Patient not taking: Reported on 3/11/2025) 500 g 2     No current facility-administered medications for this visit.     Current Outpatient Medications on File  Prior to Visit   Medication Sig    acetaminophen (TYLENOL) 500 mg tablet Take 1 tablet (500 mg total) by mouth every 6 (six) hours as needed for mild pain or moderate pain    amLODIPine (NORVASC) 5 mg tablet Take 1 tablet (5 mg total) by mouth daily    fluticasone (FLONASE) 50 mcg/act nasal spray 2 sprays into each nostril daily    Fluticasone Propionate (Xhance) 93 MCG/ACT EXHU 1 Application into each nostril in the morning and 1 Application before bedtime.    Fluticasone-Salmeterol (Advair) 250-50 mcg/dose inhaler Inhale 1 puff 2 (two) times a day Rinse mouth after use.    hydrocortisone 1 % cream Apply topically 4 (four) times a day as needed for irritation    latanoprost (XALATAN) 0.005 % ophthalmic solution     lisinopril (ZESTRIL) 40 mg tablet Take 1 tablet (40 mg total) by mouth daily    [DISCONTINUED] albuterol (PROVENTIL HFA,VENTOLIN HFA) 90 mcg/act inhaler Inhale 2 puffs as needed for wheezing    [DISCONTINUED] fluticasone (FLONASE) 50 mcg/act nasal spray 2 sprays into each nostril daily    [DISCONTINUED] levothyroxine (Levoxyl) 50 mcg tablet Take 1 tablet (50 mcg total) by mouth daily    [DISCONTINUED] metFORMIN (GLUCOPHAGE) 500 mg tablet Take 1 tablet (500 mg total) by mouth daily    [DISCONTINUED] pravastatin (PRAVACHOL) 40 mg tablet Take 1 tablet (40 mg total) by mouth daily    ciclopirox (LOPROX) 0.77 % SUSP Apply 1 Application topically 2 (two) times a day (Patient not taking: Reported on 3/11/2025)    docusate sodium (COLACE) 100 mg capsule Take 1 capsule (100 mg total) by mouth 2 (two) times a day (Patient not taking: Reported on 3/11/2025)    magnesium hydroxide (MILK OF MAGNESIA) 400 mg/5 mL oral suspension Take 15 mL by mouth daily at bedtime (Patient not taking: Reported on 6/27/2023)    psyllium (METAMUCIL,KONSYL) 30.9 % Take 7.5 g by mouth daily at bedtime (Patient not taking: Reported on 3/11/2025)     No current facility-administered medications on file prior to visit.     Medications  "Discontinued During This Encounter   Medication Reason    pravastatin (PRAVACHOL) 40 mg tablet Reorder    metFORMIN (GLUCOPHAGE) 500 mg tablet Reorder    levothyroxine (Levoxyl) 50 mcg tablet Reorder    fluticasone (FLONASE) 50 mcg/act nasal spray Reorder    albuterol (PROVENTIL HFA,VENTOLIN HFA) 90 mcg/act inhaler Reorder      He is allergic to nyquil multi-symptom [pseudoeph-doxylamine-dm-apap] and penicillins..    Review of Systems   Constitutional:  Negative for appetite change, chills, fatigue and fever.   HENT:  Negative for sore throat and trouble swallowing.    Eyes:  Negative for redness.   Respiratory:  Negative for shortness of breath.    Cardiovascular:  Negative for chest pain and palpitations.   Gastrointestinal:  Negative for abdominal pain, constipation and diarrhea.   Genitourinary:  Negative for dysuria and hematuria.   Musculoskeletal:  Negative for back pain and neck pain.   Skin:  Negative for rash.   Neurological:  Negative for seizures, weakness and headaches.   Hematological:  Negative for adenopathy.   Psychiatric/Behavioral:  Negative for confusion. The patient is not nervous/anxious.          Objective:      /82   Pulse 83   Temp 98.7 °F (37.1 °C) (Temporal)   Ht 5' 5\" (1.651 m)   Wt 83.8 kg (184 lb 12.8 oz)   SpO2 97%   BMI 30.75 kg/m²     Results Reviewed       None            Recent Results (from the past 8 weeks)   COMPREHENSIVE METABOLIC PANEL    Collection Time: 06/03/25  7:53 AM   Result Value Ref Range    Glucose 126 (H) 65 - 99 mg/dL    BUN 17 7 - 28 mg/dL    Creatinine 0.97 0.53 - 1.30 mg/dL    Sodium 144 135 - 145 mmol/L    Potassium 4.2 3.5 - 5.2 mmol/L    Chloride 107 100 - 109 mmol/L    Carbon Dioxide 29 21 - 31 mmol/L    Calcium 8.5 8.5 - 10.5 mg/dL    Alkaline Phosphatase 92 35 - 120 U/L    ALBUMIN 4.1 3.5 - 5.7 g/dL    Total Bilirubin 1.2 (H) 0.2 - 1.0 mg/dL    Protein, Total 6.9 6.3 - 8.3 g/dL    AST 16 <41 U/L    ALT 12 <56 U/L    ANION GAP 8 3 - 11    eGFRcr " 80 >59    eGFR Comment Interpretive information: calculated GFR    T4, FREE    Collection Time: 06/03/25  7:53 AM   Result Value Ref Range    FREE T4 1.11 0.61 - 1.12 ng/dL   TSH, 3RD GENERATION    Collection Time: 06/03/25  7:53 AM   Result Value Ref Range    TSH 2.61 0.45 - 5.33 uIU/mL   TSH, 3rd generation    Collection Time: 06/03/25  7:53 AM   Result Value Ref Range    TSH 2.61 0.45 - 5.33 uIU/mL   T4, free    Collection Time: 06/03/25  7:53 AM   Result Value Ref Range    Free t4 1.11 0.61 - 1.12 ng/dL   Comprehensive metabolic panel    Collection Time: 06/03/25  7:53 AM   Result Value Ref Range    Glucose, Random 126 (H) 65 - 99 mg/dL    BUN 17 7 - 28 mg/dL    Creatinine 0.97 0.53 - 1.30 mg/dL    Sodium 144 135 - 145 mmol/L    Potassium 4.2 3.5 - 5.2 mmol/L    Chloride 107 100 - 109 mmol/L    CO2 29 21 - 31 mmol/L    Calcium 8.5 8.5 - 10.5 mg/dL    Alkaline Phosphatase 92 35 - 120 U/L    Albumin 4.1 3.5 - 5.7 g/dL    TOTAL BILIRUBIN 1.2 (H) 0.2 - 1.0 mg/dL    Protein, Total 6.9 6.3 - 8.3 g/dL    AST 16 <41 U/L    ALT 12 <56 U/L    ANION GAP 8 3 - 11    eGFR 80 >59    Comment Interpretive information: calculated GFR    Urinalysis with microscopic    Collection Time: 06/03/25  7:53 AM   Result Value Ref Range    Specific Gravity Ur 1.020 1.003 - 1.030    pH, Urine 7.0 4.5 - 8.0    Protein, Urine Negative Negative mg/dL    Glucose, Urine Negative Negative mg/dL    Ketone, Urine Negative Negative mg/dL    Blood, Urine Negative Negative mg/dL    Leukocyte Esterase Negative Negative /uL    Nitrites Urine Negative Negative    POCT URINE COMMENT Not applicable     RBC, Urine 0 0 - 2 /hpf    SL AMB WBC, URINE 0 0 - 5 /hpf    MUCUS THREADS Few    CBC and differential    Collection Time: 06/03/25  7:53 AM   Result Value Ref Range    Hemoglobin 14.2 12.5 - 17.0 g/dL    HCT 42.2 37.0 - 48.0 %    White Blood Cell Count 8.1 4.0 - 10.5 thou/cmm    Red Blood Cell Count 4.77 4.00 - 5.40 mill/cmm    Platelet Count 141 140 - 350  thou/cmm    SL AMB MPV 10.9 7.5 - 11.3 fL    MCV 89 80 - 100 fL    MCH 29.9 27.0 - 36.0 pg    MCHC 33.8 32.0 - 37.0 g/dL    RDW 14.5 12.0 - 16.0 %    Differential Type AUTO     Neutrophils (Absolute) 5.4 1.8 - 7.8 thou/cmm    Lymphocytes (Absolute) 1.6 1.0 - 3.0 thou/cmm    Monocytes (Absolute) 0.8 0.3 - 1.0 thou/cmm    Eosinophils (Absolute) 0.3 0.0 - 0.5 thou/cmm    Basophils ABS 0.1 0.0 - 0.1 thou/cmm    Neutrophils 65 %    Lymphocytes 20 %    Monocytes 10 %    Eosinophils 4 %    Basophils PCT 1 %    Giant Platelets Slight     Hematology Comments           Physical Exam  Constitutional:       General: He is not in acute distress.     Appearance: Normal appearance.   HENT:      Head: Normocephalic and atraumatic.      Nose: Nose normal.      Mouth/Throat:      Mouth: Mucous membranes are moist.     Eyes:      Extraocular Movements: Extraocular movements intact.      Pupils: Pupils are equal, round, and reactive to light.       Cardiovascular:      Rate and Rhythm: Normal rate and regular rhythm.      Pulses: Normal pulses.      Heart sounds: Normal heart sounds. No murmur heard.     No friction rub.   Pulmonary:      Effort: Pulmonary effort is normal. No respiratory distress.      Breath sounds: Normal breath sounds. No wheezing.   Abdominal:      General: Abdomen is flat. Bowel sounds are normal. There is no distension.      Palpations: Abdomen is soft. There is no mass.      Tenderness: There is no abdominal tenderness. There is no guarding.     Musculoskeletal:         General: Normal range of motion.      Cervical back: Neck supple.     Neurological:      General: No focal deficit present.      Mental Status: He is alert and oriented to person, place, and time. Mental status is at baseline.      Cranial Nerves: No cranial nerve deficit.     Psychiatric:         Mood and Affect: Mood normal.         Behavior: Behavior normal.

## 2025-06-24 NOTE — PROGRESS NOTES
Diabetic Foot Exam    Patient's shoes and socks removed.    Right Foot/Ankle   Right Foot Inspection  Skin Exam: skin normal. No dry skin, no warmth, no callus, no erythema, no maceration, no abnormal color, no pre-ulcer, no ulcer and no callus.     Toe Exam: ROM and strength within normal limits.     Sensory   Monofilament testing: intact    Vascular  The right DP pulse is 1+. The right PT pulse is 1+.     Left Foot/Ankle  Left Foot Inspection  Skin Exam: skin normal. No dry skin, no warmth, no erythema, no maceration, normal color, no pre-ulcer, no ulcer and no callus.     Toe Exam: ROM and strength within normal limits.     Sensory   Monofilament testing: intact    Vascular  The left DP pulse is 1+. The left PT pulse is 1+.     Assign Risk Category  No deformity present  No loss of protective sensation  No weak pulses  Risk: 0

## 2025-07-11 DIAGNOSIS — E11.9 TYPE 2 DIABETES MELLITUS WITHOUT COMPLICATION, WITH LONG-TERM CURRENT USE OF INSULIN (HCC): ICD-10-CM

## 2025-07-11 DIAGNOSIS — I10 HYPERTENSION, UNSPECIFIED TYPE: ICD-10-CM

## 2025-07-11 DIAGNOSIS — Z79.4 TYPE 2 DIABETES MELLITUS WITHOUT COMPLICATION, WITH LONG-TERM CURRENT USE OF INSULIN (HCC): ICD-10-CM

## 2025-07-11 DIAGNOSIS — E78.5 HYPERLIPIDEMIA, UNSPECIFIED HYPERLIPIDEMIA TYPE: ICD-10-CM

## 2025-07-11 RX ORDER — PRAVASTATIN SODIUM 40 MG
40 TABLET ORAL DAILY
Qty: 90 TABLET | Refills: 1 | Status: SHIPPED | OUTPATIENT
Start: 2025-07-11

## 2025-07-11 RX ORDER — AMLODIPINE BESYLATE 5 MG/1
5 TABLET ORAL DAILY
Qty: 100 TABLET | Refills: 1 | Status: SHIPPED | OUTPATIENT
Start: 2025-07-11

## 2025-07-30 DIAGNOSIS — J45.40 MODERATE PERSISTENT ASTHMA WITHOUT COMPLICATION: ICD-10-CM

## 2025-07-30 RX ORDER — FLUTICASONE PROPIONATE AND SALMETEROL 250; 50 UG/1; UG/1
1 POWDER RESPIRATORY (INHALATION) 2 TIMES DAILY
Qty: 180 BLISTER | Refills: 1 | Status: SHIPPED | OUTPATIENT
Start: 2025-07-30 | End: 2026-01-26

## (undated) DEVICE — NEEDLE SPINAL 22G X 3.5IN  QUINCKE

## (undated) DEVICE — TUBING SUCTION 5MM X 12 FT

## (undated) DEVICE — SUT PLAIN 4-0 SC-1 18 IN 1828H

## (undated) DEVICE — NEEDLE 25G X 1 1/2

## (undated) DEVICE — Device

## (undated) DEVICE — SUT CHROMIC 4-0 P-3 18 MM 1654G

## (undated) DEVICE — SUT PROLENE 3-0 SH 36 IN 8522H

## (undated) DEVICE — HEMOSTAT POWDER ADSORB SURGICEL 3GM

## (undated) DEVICE — GLOVE SRG BIOGEL ORTHOPEDIC 7

## (undated) DEVICE — NEURO PATTIES 1/2 X 1 1/2

## (undated) DEVICE — PENCIL ELECTROSURG E-Z CLEAN -0035H

## (undated) DEVICE — SPLINT 1527005 10PK PAIR NASAL STD THICK

## (undated) DEVICE — STERILE NASAL PACK: Brand: CARDINAL HEALTH

## (undated) DEVICE — BLADE 1884004HR TRICUT 5PK M4 4MM ROTATE: Brand: TRICUT

## (undated) DEVICE — INTENDED FOR TISSUE SEPARATION, AND OTHER PROCEDURES THAT REQUIRE A SHARP SURGICAL BLADE TO PUNCTURE OR CUT.: Brand: BARD-PARKER ® CARBON RIB-BACK BLADES

## (undated) DEVICE — ANTI-FOG SOLUTION WITH FOAM PAD: Brand: DEVON

## (undated) DEVICE — ARISTA AH ABSORBABLE HEMOSTATIC PARTICLES: Brand: ARISTA™ AH

## (undated) DEVICE — ELECTRODE BLADE MOD E-Z CLEAN 2.5IN 6.4CM -0012M

## (undated) DEVICE — MAYO STAND COVER: Brand: CONVERTORS

## (undated) DEVICE — SCD SEQUENTIAL COMPRESSION COMFORT SLEEVE MEDIUM KNEE LENGTH: Brand: KENDALL SCD

## (undated) DEVICE — MAGNETIC INSTRUMENT PAD 16" X 20"; LARGE; DISPOSABLE: Brand: CARDINAL HEALTH

## (undated) DEVICE — 3000CC GUARDIAN II: Brand: GUARDIAN